# Patient Record
Sex: FEMALE | Race: OTHER | Employment: FULL TIME | URBAN - METROPOLITAN AREA
[De-identification: names, ages, dates, MRNs, and addresses within clinical notes are randomized per-mention and may not be internally consistent; named-entity substitution may affect disease eponyms.]

---

## 2019-11-24 ENCOUNTER — HOSPITAL ENCOUNTER (EMERGENCY)
Age: 39
Discharge: HOME | End: 2019-11-24
Attending: EMERGENCY MEDICINE
Payer: COMMERCIAL

## 2019-11-24 VITALS
DIASTOLIC BLOOD PRESSURE: 70 MMHG | TEMPERATURE: 98.4 F | WEIGHT: 204 LBS | HEIGHT: 63 IN | RESPIRATION RATE: 18 BRPM | OXYGEN SATURATION: 100 % | SYSTOLIC BLOOD PRESSURE: 121 MMHG | BODY MASS INDEX: 36.14 KG/M2 | HEART RATE: 72 BPM

## 2019-11-24 DIAGNOSIS — B96.89 BV (BACTERIAL VAGINOSIS): Primary | ICD-10-CM

## 2019-11-24 DIAGNOSIS — N76.0 BV (BACTERIAL VAGINOSIS): Primary | ICD-10-CM

## 2019-11-24 LAB
HCG UR QL: NEGATIVE
SERVICE CMNT-IMP: NORMAL
WET PREP GENITAL: NORMAL
WET PREP GENITAL: NORMAL

## 2019-11-24 PROCEDURE — 96372 THER/PROPH/DIAG INJ SC/IM: CPT | Performed by: EMERGENCY MEDICINE

## 2019-11-24 PROCEDURE — 74011250636 HC RX REV CODE- 250/636: Performed by: EMERGENCY MEDICINE

## 2019-11-24 PROCEDURE — 81003 URINALYSIS AUTO W/O SCOPE: CPT | Performed by: EMERGENCY MEDICINE

## 2019-11-24 PROCEDURE — 74011250637 HC RX REV CODE- 250/637: Performed by: EMERGENCY MEDICINE

## 2019-11-24 PROCEDURE — 87210 SMEAR WET MOUNT SALINE/INK: CPT

## 2019-11-24 PROCEDURE — 74011000250 HC RX REV CODE- 250: Performed by: EMERGENCY MEDICINE

## 2019-11-24 PROCEDURE — 99284 EMERGENCY DEPT VISIT MOD MDM: CPT | Performed by: EMERGENCY MEDICINE

## 2019-11-24 PROCEDURE — 81025 URINE PREGNANCY TEST: CPT

## 2019-11-24 PROCEDURE — 87491 CHLMYD TRACH DNA AMP PROBE: CPT

## 2019-11-24 RX ORDER — SERTRALINE HYDROCHLORIDE 100 MG/1
100 TABLET, FILM COATED ORAL 2 TIMES DAILY
COMMUNITY
End: 2020-06-11

## 2019-11-24 RX ORDER — CLONAZEPAM 1 MG/1
1 TABLET ORAL 2 TIMES DAILY
COMMUNITY
End: 2020-06-11

## 2019-11-24 RX ORDER — AZITHROMYCIN 250 MG/1
1000 TABLET, FILM COATED ORAL
Status: COMPLETED | OUTPATIENT
Start: 2019-11-24 | End: 2019-11-24

## 2019-11-24 RX ORDER — FLUCONAZOLE 100 MG/1
200 TABLET ORAL
Qty: 2 TAB | Refills: 0 | Status: SHIPPED | OUTPATIENT
Start: 2019-11-24 | End: 2019-11-24

## 2019-11-24 RX ORDER — METRONIDAZOLE 500 MG/1
500 TABLET ORAL 3 TIMES DAILY
Qty: 21 TAB | Refills: 0 | Status: SHIPPED | OUTPATIENT
Start: 2019-11-24 | End: 2019-12-01

## 2019-11-24 RX ADMIN — LIDOCAINE HYDROCHLORIDE 250 MG: 10 INJECTION, SOLUTION INFILTRATION; PERINEURAL at 17:41

## 2019-11-24 RX ADMIN — AZITHROMYCIN MONOHYDRATE 1000 MG: 250 TABLET ORAL at 17:41

## 2019-11-24 NOTE — ED PROVIDER NOTES
Adam Pratt is a 46 y.o. female who presents to the ED with a chief complaint of vaginal discharge with odor. Patient states she has had this over the last few days and symptoms have been persistent she also feels like there is been a catheter in her urethra and she has had some discomfort with urination. She is currently going through a divorce and is concerned about 's infidelity and would like to be checked for STDs. Once we discussed that she is okay receiving empiric treatment for gonorrhea and chlamydia. Past Medical History:   Diagnosis Date    Anxiety     Arthritis     Depression        Past Surgical History:   Procedure Laterality Date    HX  SECTION      HX GYN           History reviewed. No pertinent family history. Social History     Socioeconomic History    Marital status:      Spouse name: Not on file    Number of children: Not on file    Years of education: Not on file    Highest education level: Not on file   Occupational History    Not on file   Social Needs    Financial resource strain: Not on file    Food insecurity:     Worry: Not on file     Inability: Not on file    Transportation needs:     Medical: Not on file     Non-medical: Not on file   Tobacco Use    Smoking status: Never Smoker    Smokeless tobacco: Never Used   Substance and Sexual Activity    Alcohol use:  Yes    Drug use: Never    Sexual activity: Not on file   Lifestyle    Physical activity:     Days per week: Not on file     Minutes per session: Not on file    Stress: Not on file   Relationships    Social connections:     Talks on phone: Not on file     Gets together: Not on file     Attends Yazidism service: Not on file     Active member of club or organization: Not on file     Attends meetings of clubs or organizations: Not on file     Relationship status: Not on file    Intimate partner violence:     Fear of current or ex partner: Not on file     Emotionally abused: Not on file     Physically abused: Not on file     Forced sexual activity: Not on file   Other Topics Concern    Not on file   Social History Narrative    Not on file         ALLERGIES: Patient has no known allergies. Review of Systems   Constitutional: Negative for chills, fatigue, fever and unexpected weight change. Gastrointestinal: Negative for abdominal pain, diarrhea, nausea and vomiting. Genitourinary: Positive for vaginal discharge. Skin: Negative for color change. All other systems reviewed and are negative. Vitals:    11/24/19 1655   BP: 116/77   Pulse: 76   Resp: 17   Temp: 98.4 °F (36.9 °C)   SpO2: 97%   Weight: 92.5 kg (204 lb)   Height: 5' 3\" (1.6 m)            Physical Exam  Vitals signs and nursing note reviewed. Constitutional:       General: She is not in acute distress. Appearance: Normal appearance. She is well-developed. She is not ill-appearing, toxic-appearing or diaphoretic. HENT:      Head: Normocephalic and atraumatic. Nose: Nose normal. No congestion or rhinorrhea. Mouth/Throat:      Mouth: Mucous membranes are moist.      Pharynx: No oropharyngeal exudate or posterior oropharyngeal erythema. Eyes:      General: No scleral icterus. Extraocular Movements: Extraocular movements intact. Conjunctiva/sclera: Conjunctivae normal.      Pupils: Pupils are equal, round, and reactive to light. Neck:      Musculoskeletal: Normal range of motion. Pulmonary:      Effort: Pulmonary effort is normal. No respiratory distress. Breath sounds: No stridor. Abdominal:      General: Abdomen is flat. There is no distension. Tenderness: There is no tenderness. There is no guarding or rebound. Hernia: No hernia is present. Genitourinary:     Vagina: Vaginal discharge (No sign of herpes some white discharge in the vagina otherwise normal exam.) present. Neurological:      Mental Status: She is alert.    Psychiatric:         Behavior: Behavior normal.          MDM  Number of Diagnoses or Management Options  Diagnosis management comments: Patient tested positive for bacterial vaginosis she was also empirically treated for chlamydia and gonorrhea. We will place her on Flagyl as well. Phani Mancilla MD; 11/24/2019 @6:00 PM Voice dictation software was used during the making of this note. This software is not perfect and grammatical and other typographical errors may be present.   This note has not been proofread for errors.  ===================================================================          Amount and/or Complexity of Data Reviewed  Clinical lab tests: ordered and reviewed (Results for orders placed or performed during the hospital encounter of 11/24/19  -WET PREP       Result                      Value             Ref Range           Special Requests:                                             NO SPECIAL REQUESTS       Wet prep                                                      NO WBC'S SEEN   NO YEAST SEEN   NO TRICHOMONAS SEEN          Wet prep                                                      MODERATE   CLUE CELLS PRESENT     -HCG URINE, QL. - POC       Result                      Value             Ref Range           Pregnancy test,urine (*     NEGATIVE          NEG           )           Procedures

## 2019-11-24 NOTE — ED NOTES
I have reviewed discharge instructions with the patient. The patient verbalized understanding. Patient left ED via Discharge Method: ambulatory to Home with daughter. Opportunity for questions and clarification provided. Patient given 2 scripts. To continue your aftercare when you leave the hospital, you may receive an automated call from our care team to check in on how you are doing. This is a free service and part of our promise to provide the best care and service to meet your aftercare needs.  If you have questions, or wish to unsubscribe from this service please call [phone]. Thank you for Choosing our [facility] Emergency Department.

## 2019-11-24 NOTE — ED TRIAGE NOTES
Pt c/o white to pink discharge with odor and right abdominal pain for a couple of days. Pt has a history of STD's.

## 2019-11-24 NOTE — DISCHARGE INSTRUCTIONS
Patient Education     Bacterial Vaginosis: Care Instructions  Your Care Instructions    Bacterial vaginosis is a type of vaginal infection. It is caused by excess growth of certain bacteria that are normally found in the vagina. Symptoms can include itching, swelling, pain when you urinate or have sex, and a gray or yellow discharge with a \"fishy\" odor. It is not considered an infection that is spread through sexual contact. Although symptoms can be annoying and uncomfortable, bacterial vaginosis does not usually cause other health problems. However, if you have it while you are pregnant, it can cause complications. While the infection may go away on its own, most doctors use antibiotics to treat it. You may have been prescribed pills or vaginal cream. With treatment, bacterial vaginosis usually clears up in 5 to 7 days. Follow-up care is a key part of your treatment and safety. Be sure to make and go to all appointments, and call your doctor if you are having problems. It's also a good idea to know your test results and keep a list of the medicines you take. How can you care for yourself at home? · Take your antibiotics as directed. Do not stop taking them just because you feel better. You need to take the full course of antibiotics. · Do not eat or drink anything that contains alcohol if you are taking metronidazole (Flagyl). · Keep using your medicine if you start your period. Use pads instead of tampons while using a vaginal cream or suppository. Tampons can absorb the medicine. · Wear loose cotton clothing. Do not wear nylon and other materials that hold body heat and moisture close to the skin. · Do not scratch. Relieve itching with a cold pack or a cool bath. · Do not wash your vaginal area more than once a day. Use plain water or a mild, unscented soap. Do not douche. When should you call for help?   Watch closely for changes in your health, and be sure to contact your doctor if:    · You have unexpected vaginal bleeding.     · You have a fever.     · You have new or increased pain in your vagina or pelvis.     · You are not getting better after 1 week.     · Your symptoms return after you finish the course of your medicine. Where can you learn more? Go to http://yanelis-elliot.info/. Whit Arevalo in the search box to learn more about \"Bacterial Vaginosis: Care Instructions. \"  Current as of: February 19, 2019  Content Version: 12.2  © 8724-1232 [email or web address], [employer]. Care instructions adapted under license by [employer] (which disclaims liability or warranty for this information). If you have questions about a medical condition or this instruction, always ask your healthcare professional. [address] any warranty or liability for your use of this information.

## 2019-11-27 LAB
C TRACH RRNA SPEC QL NAA+PROBE: NEGATIVE
N GONORRHOEA RRNA SPEC QL NAA+PROBE: NEGATIVE
SPECIMEN SOURCE: NORMAL

## 2020-06-11 ENCOUNTER — HOSPITAL ENCOUNTER (EMERGENCY)
Age: 40
Discharge: HOME | End: 2020-06-11
Attending: EMERGENCY MEDICINE
Payer: COMMERCIAL

## 2020-06-11 VITALS
HEART RATE: 85 BPM | WEIGHT: 230 LBS | TEMPERATURE: 98.5 F | DIASTOLIC BLOOD PRESSURE: 84 MMHG | BODY MASS INDEX: 40.75 KG/M2 | HEIGHT: 63 IN | RESPIRATION RATE: 16 BRPM | SYSTOLIC BLOOD PRESSURE: 174 MMHG | OXYGEN SATURATION: 98 %

## 2020-06-11 DIAGNOSIS — F43.22 ADJUSTMENT DISORDER WITH ANXIOUS MOOD: Primary | ICD-10-CM

## 2020-06-11 PROCEDURE — 99283 EMERGENCY DEPT VISIT LOW MDM: CPT

## 2020-06-11 RX ORDER — LORAZEPAM 1 MG/1
.5-1 TABLET ORAL
Qty: 20 TAB | Refills: 0 | Status: SHIPPED | OUTPATIENT
Start: 2020-06-11 | End: 2022-01-10 | Stop reason: CLARIF

## 2020-06-11 RX ORDER — SERTRALINE HYDROCHLORIDE 100 MG/1
TABLET, FILM COATED ORAL
Qty: 60 TAB | Refills: 3 | Status: SHIPPED | OUTPATIENT
Start: 2020-06-11

## 2020-06-11 NOTE — ED NOTES
I have reviewed discharge instructions with the patient. The patient verbalized understanding. Patient left ED via Discharge Method: ambulatory to Home. Opportunity for questions and clarification provided. Patient given 2 scripts. To continue your aftercare when you leave the hospital, you may receive an automated call from our care team to check in on how you are doing. This is a free service and part of our promise to provide the best care and service to meet your aftercare needs.  If you have questions, or wish to unsubscribe from this service please call [phone]. Thank you for Choosing our [facility] Emergency Department.

## 2020-06-11 NOTE — ED TRIAGE NOTES
Patient signed in with back pain however when she walked into triage patient advises she is not having any back pain, in tears she states that she is probably going to get a divorce that her  kicked her out yesterday and her company may be bought out and possibly lose her job over the next few months. Patient advises some suicidal thoughts today, does not have a plan. Patient denies any thoughts of homicide. Patient advises she has had suicidal thoughts before and had inpatient care while living in [place] over 2 years ago. Patient advises that she was a patient at [facility] but due to work schedule now she has not been able to be seen there, advises that she was on Zoloft 200 mg daily and Ativan at that time. Patient advises that she has not been on any other medications until Flexeril and Aleve for recent back pain.

## 2020-06-12 NOTE — PROGRESS NOTES
2nd attempt at reaching pt, she answered and we discussed Psychiatry Care. Pt states taht she was referred to [address] [place] [address] but is not able to make it there during there business hours. She works for [employer] and if she were to go during work hours it would count as a \"point against her\". I reviewed pt's chart, and faxed clinicals to 3 local Psychiatry groups. Information sent to [facility], [facility] [employer] and [facility]. Provided pt with there information and advised her to call this afternoon to help expedite a appt. Encouraged other to please call back if she ran into any further issues.

## 2020-06-12 NOTE — ED PROVIDER NOTES
60-year-old female presents to the ER feeling depressed and anxious with fleeting suicidal ideation. Stress. Believes her  is having another affair behind her back, and has just today told her to leave the house and move out. And that he wants a divorce. 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 Prior depression prior inpatient treatment, patient did okay on Zoloft in the past but has not been on it for some time. Patient does not feel she is at risk for acting on any of the suicidal thoughts. She has secured an apartment for herself today. Past Medical History:   Diagnosis Date    Anxiety     Arthritis     Depression        Past Surgical History:   Procedure Laterality Date    HX  SECTION      HX GYN           History reviewed. No pertinent family history.     Social History     Socioeconomic History    Marital status:      Spouse name: Not on file    Number of children: Not on file    Years of education: Not on file    Highest education level: Not on file   Occupational History    Not on file   Social Needs    Financial resource strain: Not on file    Food insecurity     Worry: Not on file     Inability: Not on file    Transportation needs     Medical: Not on file     Non-medical: Not on file   Tobacco Use    Smoking status: Never Smoker    Smokeless tobacco: Never Used   Substance and Sexual Activity    Alcohol use: Not Currently    Drug use: Never    Sexual activity: Not on file   Lifestyle    Physical activity     Days per week: Not on file     Minutes per session: Not on file    Stress: Not on file   Relationships    Social connections     Talks on phone: Not on file     Gets together: Not on file     Attends Hindu service: Not on file     Active member of club or organization: Not on file     Attends meetings of clubs or organizations: Not on file     Relationship status: Not on file    Intimate partner violence     Fear of current or ex partner: Not on file     Emotionally abused: Not on file     Physically abused: Not on file     Forced sexual activity: Not on file   Other Topics Concern    Not on file   Social History Narrative    Not on file         ALLERGIES: Patient has no known allergies. Review of Systems   Psychiatric/Behavioral: Positive for dysphoric mood and suicidal ideas. Negative for agitation, confusion and self-injury. The patient is nervous/anxious. Vitals:    06/11/20 1735   BP: 174/84   Pulse: 85   Resp: 16   Temp: 98.5 °F (36.9 °C)   SpO2: 98%   Weight: 104.3 kg (230 lb)   Height: 5' 3\" (1.6 m)            Physical Exam  Vitals signs and nursing note reviewed. Constitutional:       General: She is not in acute distress. Appearance: Normal appearance. She is well-developed. She is not ill-appearing, toxic-appearing or diaphoretic. HENT:      Head: Normocephalic and atraumatic. Eyes:      General:         Right eye: No discharge. Left eye: No discharge. Conjunctiva/sclera: Conjunctivae normal.   Neck:      Musculoskeletal: Normal range of motion and neck supple. Pulmonary:      Effort: Pulmonary effort is normal. No respiratory distress. Musculoskeletal: Normal range of motion. Skin:     General: Skin is warm and dry. Findings: No rash. Neurological:      General: No focal deficit present. Mental Status: She is alert and oriented to person, place, and time. Mental status is at baseline. Motor: No abnormal muscle tone. Comments: cni 2-12 grossly  Nl gait,  Nl speech     Psychiatric:         Attention and Perception: Attention normal.         Mood and Affect: Mood is depressed (Tearful). Speech: Speech normal. She is communicative. Speech is not delayed. Behavior: Behavior normal. Behavior is cooperative.          Thought Content: Thought content includes suicidal ideation. Thought content does not include suicidal plan. MDM  Number of Diagnoses or Management Options  Adjustment disorder with anxious mood:   Diagnosis management comments: Medical decision making note:  Adjustment reaction to multiple social stressors. Depression with anxiety. We will start her on a Zoloft taper, and will go ahead and write for a few Ativan just since the marital problem has suddenly come to ahead today. Will have nurse  patient tomorrow to provide additional resources. Patient had been to [facility] in the past but had difficulty following up with them recently because she works first shift during the hours they are open. This concludes the \"medical decision making note\" part of this emergency department visit note.            Procedures

## 2020-08-04 PROBLEM — E66.01 OBESITY, MORBID (HCC): Status: ACTIVE | Noted: 2020-08-04

## 2021-08-31 ENCOUNTER — HOSPITAL ENCOUNTER (EMERGENCY)
Age: 41
Discharge: HOME | End: 2021-08-31
Attending: EMERGENCY MEDICINE
Payer: COMMERCIAL

## 2021-08-31 ENCOUNTER — APPOINTMENT (OUTPATIENT)
Dept: GENERAL RADIOLOGY | Age: 41
End: 2021-08-31
Attending: PHYSICIAN ASSISTANT
Payer: COMMERCIAL

## 2021-08-31 VITALS
SYSTOLIC BLOOD PRESSURE: 123 MMHG | HEIGHT: 63 IN | HEART RATE: 81 BPM | DIASTOLIC BLOOD PRESSURE: 70 MMHG | RESPIRATION RATE: 18 BRPM | OXYGEN SATURATION: 98 % | WEIGHT: 248 LBS | TEMPERATURE: 99.7 F | BODY MASS INDEX: 43.94 KG/M2

## 2021-08-31 DIAGNOSIS — S62.002A UNSPECIFIED FRACTURE OF NAVICULAR (SCAPHOID) BONE OF LEFT WRIST, INITIAL ENCOUNTER FOR CLOSED FRACTURE: ICD-10-CM

## 2021-08-31 DIAGNOSIS — S93.401A SPRAIN OF RIGHT ANKLE, UNSPECIFIED LIGAMENT, INITIAL ENCOUNTER: ICD-10-CM
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
## 2021-08-31 PROCEDURE — 73610 X-RAY EXAM OF ANKLE: CPT

## 2021-08-31 PROCEDURE — 75810000053 HC SPLINT APPLICATION

## 2021-08-31 PROCEDURE — 73110 X-RAY EXAM OF WRIST: CPT

## 2021-08-31 PROCEDURE — 99282 EMERGENCY DEPT VISIT SF MDM: CPT

## 2021-08-31 RX ORDER — DICLOFENAC POTASSIUM 50 MG/1
50 TABLET, FILM COATED ORAL
Qty: 30 TABLET | Refills: 0 | Status: SHIPPED | OUTPATIENT
Start: 2021-08-31 | End: 2022-01-10

## 2021-08-31 RX ORDER — TRAMADOL HYDROCHLORIDE 50 MG/1
50 TABLET ORAL
Qty: 10 TABLET | Refills: 0 | Status: SHIPPED | OUTPATIENT
Start: 2021-08-31 | End: 2021-09-02 | Stop reason: SDUPTHER

## 2021-08-31 RX ORDER — NALOXONE HYDROCHLORIDE 4 MG/.1ML
SPRAY NASAL
Qty: 1 EACH | Refills: 0 | Status: SHIPPED | OUTPATIENT
Start: 2021-08-31 | End: 2022-01-10

## 2021-08-31 NOTE — Clinical Note
[facility] EMERGENCY DEPT  [address] [address] [phone]    Work/School Note    Date: 8/31/2021    To Whom It May concern:    Uriel De Leon was seen and treated today in the emergency room by the following provider(s):  Attending Provider: Trevon Keith MD  Physician Assistant: SHAINA Echevarria. Uriel De Leon is excused from work/school on 8/31/2021 through 9/3/2021. She is medically clear to return to work/school on 9/4/2021.         Sincerely,          SHAINA Stokes

## 2021-08-31 NOTE — ED TRIAGE NOTES
Pt here with right ankle pain/swelling. Did hear/felt something pop. This happened while walking her dog and she stepped in a hole. Also c/o left wrist pain. Masked.

## 2021-08-31 NOTE — DISCHARGE INSTRUCTIONS
Rest, ice, elevate, avoid painful activities. ED if worse. Follow up with Ortho for recheck. Use crutches to help ambulate, use velcro ankle splint, do not remove wrist splint.

## 2021-08-31 NOTE — ED NOTES
I have reviewed discharge instructions with the patient. The patient verbalized understanding. Patient left ED via Discharge Method: ambulatory to Home with self. Opportunity for questions and clarification provided. Patient given 3 scripts. To continue your aftercare when you leave the hospital, you may receive an automated call from our care team to check in on how you are doing. This is a free service and part of our promise to provide the best care and service to meet your aftercare needs.  If you have questions, or wish to unsubscribe from this service please call [phone]. Thank you for Choosing our [facility] Emergency Department.

## 2021-08-31 NOTE — ED PROVIDER NOTES
Patient was walking her dog prior to arrival when it took off and pulled her. She turned her right ankle and landed on an outstretched arm on the left. She has left wrist pain and right ankle pain. She is able to ambulate with an antalgic gait due to pain in her ankle. She did not hit her head nor did she have any loss of consciousness. No chest pain, shortness of breath, abdominal pain or other new symptoms. The history is provided by the patient. Wrist Pain   This is a new problem. The current episode started 1 to 2 hours ago. The problem occurs constantly. The problem has not changed since onset. The pain is present in the left wrist and right ankle. The quality of the pain is described as aching. The pain is at a severity of 7/10. The pain is moderate. The symptoms are aggravated by movement and activity. She has tried nothing for the symptoms. There has been a history of trauma. Past Medical History:   Diagnosis Date    Abnormal Papanicolaou smear of cervix 2004    repeat normal    Anemia     Anxiety     Arthritis     Depression     Herpes genitalia     History of chicken pox        Past Surgical History:   Procedure Laterality Date    HX  SECTION      HX DILATION AND CURETTAGE  2011    Miscarriage    HX GYN  2018    tubes removed         Family History:   Problem Relation Age of Onset    Depression Mother     Liver Disease Mother         hep c cirrhos    Hypertension Father     Arthritis-osteo Maternal Aunt     Hypertension Paternal Aunt     Diabetes Maternal Grandmother     Breast Cancer Maternal Grandmother         lung. Pt actually not sure which was the primary.  She  at 58    Osteoporosis Maternal Grandmother     Hypertension Paternal Grandmother        Social History     Socioeconomic History    Marital status: LEGALLY      Spouse name: Not on file    Number of children: Not on file    Years of education: Not on file    Highest education level: Not on file   Occupational History    Not on file   Tobacco Use    Smoking status: Never Smoker    Smokeless tobacco: Never Used   Substance and Sexual Activity    Alcohol use: Not Currently    Drug use: Never    Sexual activity: Not Currently     Birth control/protection: Surgical   Other Topics Concern    Not on file   Social History Narrative    Not on file     Social Determinants of Health     Financial Resource Strain:     Difficulty of Paying Living Expenses:    Food Insecurity:     Worried About Running Out of Food in the Last Year:     [address] in the Last Year:    Transportation Needs:     Lack of Transportation (Medical):  Lack of Transportation (Non-Medical):    Physical Activity:     Days of Exercise per Week:     Minutes of Exercise per Session:    Stress:     Feeling of Stress :    Social Connections:     Frequency of Communication with Friends and Family:     Frequency of Social Gatherings with Friends and Family:     Attends Shinto Services:     Active Member of Clubs or Organizations:     Attends Club or Organization Meetings:     Marital Status:    Intimate Partner Violence:     Fear of Current or Ex-Partner:     Emotionally Abused:     Physically Abused:     Sexually Abused: ALLERGIES: Patient has no known allergies. Review of Systems   Constitutional: Negative. HENT: Negative. Eyes: Negative. Respiratory: Negative. Cardiovascular: Negative. Gastrointestinal: Negative. Genitourinary: Negative. Musculoskeletal: Negative. Right ankle and left wrist pain   Skin: Negative. Neurological: Negative. Psychiatric/Behavioral: Negative. All other systems reviewed and are negative. Vitals:    08/31/21 1453   BP: 123/70   Pulse: 81   Resp: 18   Temp: 99.7 °F (37.6 °C)   SpO2: 98%   Weight: 112.5 kg (248 lb)   Height: 5' 3\" (1.6 m)            Physical Exam  Vitals and nursing note reviewed.    Constitutional: Appearance: She is well-developed. HENT:      Head: Normocephalic and atraumatic. Right Ear: External ear normal.      Left Ear: External ear normal.      Nose: Nose normal.   Eyes:      Conjunctiva/sclera: Conjunctivae normal.      Pupils: Pupils are equal, round, and reactive to light. Cardiovascular:      Rate and Rhythm: Normal rate and regular rhythm. Heart sounds: Normal heart sounds. Pulmonary:      Effort: Pulmonary effort is normal.      Breath sounds: Normal breath sounds. Abdominal:      General: Bowel sounds are normal.      Palpations: Abdomen is soft. Musculoskeletal:         General: Swelling and tenderness present. Normal range of motion. Left wrist: Tenderness and bony tenderness present. Arms:       Cervical back: Normal range of motion and neck supple. Legs:    Skin:     General: Skin is warm and dry. Capillary Refill: Capillary refill takes less than 2 seconds. Neurological:      General: No focal deficit present. Mental Status: She is alert and oriented to person, place, and time. Deep Tendon Reflexes: Reflexes are normal and symmetric. Psychiatric:         Mood and Affect: Mood normal.         Behavior: Behavior normal.         Thought Content:  Thought content normal.         Judgment: Judgment normal.          MDM  Number of Diagnoses or Management Options     Amount and/or Complexity of Data Reviewed  Tests in the radiology section of CPT®: ordered and reviewed    Risk of Complications, Morbidity, and/or Mortality  Presenting problems: moderate  Diagnostic procedures: moderate  Management options: moderate    Patient Progress  Patient progress: improved         Splint, Thumb Gutter    Date/Time: 8/31/2021 5:29 PM  Performed by: Sophie Skiff, PA  Authorized by: Sophie Skiff, PA     Consent:     Consent obtained:  Verbal    Consent given by:  Patient    Risks discussed:  Discoloration, numbness, pain and swelling    Alternatives discussed:  No treatment, delayed treatment, alternative treatment, observation and referral  Pre-procedure details:     Sensation:  Normal  Procedure details:     Laterality:  Left    Location:  Wrist    Wrist:  L wrist    Splint type:  Thumb spica    Supplies:  Ortho-Glass  Post-procedure details:     Pain:  Improved    Patient tolerance of procedure: Tolerated well, no immediate complications        The patient was observed in the ED. Results Reviewed:  XR WRIST LT AP/LAT/OBL MIN 3V   Final Result      1. There is a subtle lucency involving the distal pole of the scaphoid,   incompletely traversing the bone. This is seen on the oblique view only and may   be a vascular channel. However, if there is tenderness to palpation in the   region of the scaphoid, a nondisplaced fracture is possible. MRI may be   beneficial for further evaluation as deemed clinically useful. 2. Right ankle soft tissue swelling without underlying fracture. Degenerative   findings are otherwise as detailed above. VOICE DICTATED BY: Dr. Adalberto Mayen          XR ANKLE RT MIN 3 V   Final Result      1. There is a subtle lucency involving the distal pole of the scaphoid,   incompletely traversing the bone. This is seen on the oblique view only and may   be a vascular channel. However, if there is tenderness to palpation in the   region of the scaphoid, a nondisplaced fracture is possible. MRI may be   beneficial for further evaluation as deemed clinically useful. 2. Right ankle soft tissue swelling without underlying fracture. Degenerative   findings are otherwise as detailed above. VOICE DICTATED BY: Dr. Adalberto Mayen            Patient will be placed in a thumb spica splint here for the possible scaphoid findings on x-ray. She does have mild limited range of motion but no point tenderness right in that area. We will treat conservatively anyway.   I did refer her to orthopedics for further evaluation. We will also place her in a Velcro ankle splint and give her crutches to help her keep her weight off of that ankle. I have written a note out of work until she can see the orthopedic for follow-up. She should rest, ice, elevate and avoid painful activities. She is stable for discharge at this time. I have sent her with pain medicine if needed as well. I discussed the results of all labs, procedures, radiographs, and treatments with the patient and available family. Treatment plan is agreed upon and the patient is ready for discharge. All voiced understanding of the discharge plan and medication instructions or changes as appropriate. Questions about treatment in the ED were answered. All were encouraged to return should symptoms worsen or new problems develop.

## 2021-08-31 NOTE — LETTER
[address] [address] EMERGENCY DEPT  [facility] [id number] [phone]    Work/School Note    Date: 8/31/2021    To Whom It May concern:    Uriel De Leon was seen and treated today in the emergency room by the following provider(s):  Physician Assistant: SHAINA Echevarria. Uriel De Leon may return to work after appointment with Ortho.     Sincerely,          SHAINA Stokes

## 2021-09-02 PROBLEM — S63.502A SPRAIN OF LEFT WRIST: Status: ACTIVE | Noted: 2021-09-02

## 2021-09-02 PROBLEM — M25.532 LEFT WRIST PAIN: Status: ACTIVE | Noted: 2021-09-02

## 2022-01-04 NOTE — H&P (VIEW-ONLY)
Name: Shahid Field  YOB: 1980  Gender: female  MRN: [id number]    Summary: Right peroneal tearing, ATFL CFL tear, ankle synovitis and deltoid tear. Proceed with ankle arthroscopy, peroneal tendon repair/debridement. Popliteal saphenous block. CC: Right lateral ankle/foot pain    HPI: Shahid Field is a 37 y.o. female who presents today with lateral ankle pain which began August 31, 2021 when she rolled her foot in a ditch. She states she had difficult time walking. She went to an urgent care who diagnosed her with an ankle sprain. She saw Dr. Chrissie Olmedo who treated her with physical therapy, cam walker boot, transition to an ankle brace, nonsteroidal medications, and a Medrol Dosepak. She has improved but she continues to have pain. She cannot work. She cannot do most activities due to ankle pain. .   They locate the pain over the lateral ankle and behind the fibula. They describe pain as a sharp, deep and tearing lateral ankle pain. She describes pain also throughout the front of the ankle and extending medially. She states when she walks down stairs her ankle feels unstable and she can barely go down. Going up stairs is not as problematic. She states her ankle feels uneasy and she feels like it is about to give way at all times. She has attempted physical therapy, Medrol Dosepak, cam boot, ASO, nonsteroidal medications. History was obtained by patient    ROS/Meds/PSH/PMH/FH/SH: I personally reviewed the patients standard intake form. Below are the pertinents    Tobacco:  reports that she has never smoked. She has never used smokeless tobacco.  Diabetes: None  Other: none    Physical Examination:  Right Lower Extremity: : FROM actively of toes, foot, knee and hip. 55 strength to TAEHLGSCPtib/Ant Tib. When isolating their peroneals there is  pain w resisted eversion and resisted plantarflexion. No skin lesions or ulcers.   They are TTP over the peroneal tendons behind the fibula and even sub fibular. Very tender over the deltoid ligament. Very tender over the anterior lateral ankle ligamentous complex. She has some swelling and edema noted throughout the ankle. Talar tilt exam : Was too painful and she would not allow. Anterior-lateral rotational drawer exam : To painful and would not allow. Imaging:   I reviewed the MRI taken on 10/15/2021. IMPRESSION  Impression:  1. High-grade versus complete tears of the anterior talofibular and  calcaneofibular ligaments. 2. Sprain versus partial tears to the deltoid ligament complex. 3. Partial longitudinal split tearing of the inframalleolar peroneus brevis  tendon. 4. Small tibiotalar joint effusion    Assessment:   Right Peroneal Tendonitis  Right ankle ATFL and CFL tear with instability  Right ankle synovitis    Plan:   4 This is an acute complicated injury  Treatment at this time: Elective major surgery with procedural risk factors    Weight-bearing status: WBAT in Ankle Brace        Studies ordered: none  Return to work/work restrictions: No work until next follow up  Prednisone Taper- Dose Pack:  I discussed Prednisone being a steroid and how it can elevate blood sugars. I recommended to monitor sugars closely and to beware of symptoms such as lethargy, excessive thirst, polyuria, and GI distress. We also discussed the dose pack taper format and how long term steroid use can alter adrenal function. I also discussed steroids effect on depression and mood. How in some people it can exacerbate underlying depression while in others create a more manic response. Follow up       I want her to continue physical therapy as well. I had a long discussion with her about nonoperative treatment to consist of continued physical therapy, bracing, and injections. She states she is not interested in injections and does not wish to delay over her recovery.   I explained that nonoperative treatment could be successful however cannot guarantee it. She does not want to continue nonoperative management if I cannot guarantee Success. I then discussed surgical treatment with her to consist of ankle arthroscopy and peroneal tendon debridement versus repair. I explained that each of these 3 different surgeries have individual risks include nerve damage, tendon damage, chronic pain, and infections. I explained that although I do know she has pathology in each of these segments I cannot guarantee her surgery would be 100% successful. In fact I told her she would continue to have some swelling and aching despite surgery. She is almost 3 months out from her injury and is attempted all nonoperative treatments. MRI does show pathology through her peroneal tendons. I think trend the peroneal tendons and ankle arthroscopy will provide relief of her symptoms. Based on our discussions today and the fact that she is approximately 4 months out from her injury I think that a lateral ankle reconstruction is not appropriate. I discussed that she may continue to have symptoms of the lateral ankle reconstruction ligaments/ATFL/CFL and continue to have deltoid ligament issues. However we know that these should heal themselves and that surgery on these ligaments is an appropriate at this time. I explained that if she continues to have symptoms of the lateral ankle ligamentous complex or medial deltoid in the future we can address them but these normally heal on their own and do not require surgical intervention. .  She understands these risk and consents to surgery.

## 2022-01-11 ENCOUNTER — ANESTHESIA EVENT (OUTPATIENT)
Dept: SURGERY | Age: 42
End: 2022-01-11
Payer: COMMERCIAL

## 2022-01-12 ENCOUNTER — HOSPITAL ENCOUNTER (OUTPATIENT)
Age: 42
Setting detail: OUTPATIENT SURGERY
Discharge: HOME | End: 2022-01-12
Attending: ORTHOPAEDIC SURGERY | Admitting: ORTHOPAEDIC SURGERY
Payer: COMMERCIAL

## 2022-01-12 ENCOUNTER — ANESTHESIA (OUTPATIENT)
Dept: SURGERY | Age: 42
End: 2022-01-12
Payer: COMMERCIAL

## 2022-01-12 VITALS
OXYGEN SATURATION: 100 % | HEART RATE: 104 BPM | SYSTOLIC BLOOD PRESSURE: 128 MMHG | WEIGHT: 235 LBS | BODY MASS INDEX: 41.63 KG/M2 | RESPIRATION RATE: 16 BRPM | TEMPERATURE: 97.8 F | DIASTOLIC BLOOD PRESSURE: 63 MMHG

## 2022-01-12 LAB
COVID-19 RAPID TEST, COVR: NOT DETECTED
SOURCE, COVRS: NORMAL

## 2022-01-12 PROCEDURE — 77030002933 HC SUT MCRYL J&J -A: Performed by: ORTHOPAEDIC SURGERY

## 2022-01-12 PROCEDURE — 76060000034 HC ANESTHESIA 1.5 TO 2 HR: Performed by: ORTHOPAEDIC SURGERY

## 2022-01-12 PROCEDURE — 74011250636 HC RX REV CODE- 250/636: Performed by: NURSE ANESTHETIST, CERTIFIED REGISTERED

## 2022-01-12 PROCEDURE — 76210000006 HC OR PH I REC 0.5 TO 1 HR: Performed by: ORTHOPAEDIC SURGERY

## 2022-01-12 PROCEDURE — 76010000162 HC OR TIME 1.5 TO 2 HR INTENSV-TIER 1: Performed by: ORTHOPAEDIC SURGERY

## 2022-01-12 PROCEDURE — 76010010054 HC POST OP PAIN BLOCK: Performed by: ORTHOPAEDIC SURGERY

## 2022-01-12 PROCEDURE — 77030003602 HC NDL NRV BLK BBMI -B: Performed by: ANESTHESIOLOGY

## 2022-01-12 PROCEDURE — 77030002922 HC SUT FBRWRE ARTH -B: Performed by: ORTHOPAEDIC SURGERY

## 2022-01-12 PROCEDURE — 74011250636 HC RX REV CODE- 250/636: Performed by: ANESTHESIOLOGY

## 2022-01-12 PROCEDURE — 77030003666 HC NDL SPINAL BD -A: Performed by: ORTHOPAEDIC SURGERY

## 2022-01-12 PROCEDURE — 27658 REPAIR OF LEG TENDON EACH: CPT | Performed by: ORTHOPAEDIC SURGERY

## 2022-01-12 PROCEDURE — 27680 RELEASE OF LOWER LEG TENDON: CPT | Performed by: ORTHOPAEDIC SURGERY

## 2022-01-12 PROCEDURE — 29897 ANKLE ARTHROSCOPY/SURGERY: CPT | Performed by: ORTHOPAEDIC SURGERY

## 2022-01-12 PROCEDURE — 74011000250 HC RX REV CODE- 250: Performed by: NURSE ANESTHETIST, CERTIFIED REGISTERED

## 2022-01-12 PROCEDURE — 77030040922 HC BLNKT HYPOTHRM STRY -A: Performed by: ANESTHESIOLOGY

## 2022-01-12 PROCEDURE — 74011250636 HC RX REV CODE- 250/636: Performed by: ORTHOPAEDIC SURGERY

## 2022-01-12 PROCEDURE — 77030002916 HC SUT ETHLN J&J -A: Performed by: ORTHOPAEDIC SURGERY

## 2022-01-12 PROCEDURE — 77030002982 HC SUT POLYSRB J&J -A: Performed by: ORTHOPAEDIC SURGERY

## 2022-01-12 PROCEDURE — 77030038066 HC BLD SHVR ARTH -B: Performed by: ORTHOPAEDIC SURGERY

## 2022-01-12 PROCEDURE — 76942 ECHO GUIDE FOR BIOPSY: CPT | Performed by: ORTHOPAEDIC SURGERY

## 2022-01-12 PROCEDURE — 87635 SARS-COV-2 COVID-19 AMP PRB: CPT

## 2022-01-12 PROCEDURE — 74011250637 HC RX REV CODE- 250/637: Performed by: ANESTHESIOLOGY

## 2022-01-12 PROCEDURE — 77030000032 HC CUF TRNQT ZIMM -B: Performed by: ORTHOPAEDIC SURGERY

## 2022-01-12 PROCEDURE — 77030010210 HC SPLNT P-CUT SAF BSNM -B: Performed by: ORTHOPAEDIC SURGERY

## 2022-01-12 PROCEDURE — 2709999900 HC NON-CHARGEABLE SUPPLY: Performed by: ORTHOPAEDIC SURGERY

## 2022-01-12 PROCEDURE — 76210000020 HC REC RM PH II FIRST 0.5 HR: Performed by: ORTHOPAEDIC SURGERY

## 2022-01-12 RX ORDER — HYDROMORPHONE HYDROCHLORIDE 2 MG/ML
0.5 INJECTION, SOLUTION INTRAMUSCULAR; INTRAVENOUS; SUBCUTANEOUS
Status: DISCONTINUED | OUTPATIENT
Start: 2022-01-12 | End: 2022-01-12 | Stop reason: HOSPADM

## 2022-01-12 RX ORDER — ACETAMINOPHEN 500 MG
1000 TABLET ORAL ONCE
Status: COMPLETED | OUTPATIENT
Start: 2022-01-12 | End: 2022-01-12

## 2022-01-12 RX ORDER — SODIUM CHLORIDE, SODIUM LACTATE, POTASSIUM CHLORIDE, CALCIUM CHLORIDE 600; 310; 30; 20 MG/100ML; MG/100ML; MG/100ML; MG/100ML
75 INJECTION, SOLUTION INTRAVENOUS CONTINUOUS
Status: DISCONTINUED | OUTPATIENT
Start: 2022-01-12 | End: 2022-01-12 | Stop reason: HOSPADM

## 2022-01-12 RX ORDER — CEFAZOLIN SODIUM/WATER 2 G/20 ML
2 SYRINGE (ML) INTRAVENOUS ONCE
Status: COMPLETED | OUTPATIENT
Start: 2022-01-12 | End: 2022-01-12

## 2022-01-12 RX ORDER — FENTANYL CITRATE 50 UG/ML
100 INJECTION, SOLUTION INTRAMUSCULAR; INTRAVENOUS ONCE
Status: COMPLETED | OUTPATIENT
Start: 2022-01-12 | End: 2022-01-12

## 2022-01-12 RX ORDER — PROPOFOL 10 MG/ML
INJECTION, EMULSION INTRAVENOUS AS NEEDED
Status: DISCONTINUED | OUTPATIENT
Start: 2022-01-12 | End: 2022-01-12 | Stop reason: HOSPADM

## 2022-01-12 RX ORDER — DEXAMETHASONE SODIUM PHOSPHATE 4 MG/ML
INJECTION, SOLUTION INTRA-ARTICULAR; INTRALESIONAL; INTRAMUSCULAR; INTRAVENOUS; SOFT TISSUE
Status: COMPLETED | OUTPATIENT
Start: 2022-01-12 | End: 2022-01-12

## 2022-01-12 RX ORDER — LIDOCAINE HYDROCHLORIDE 10 MG/ML
0.1 INJECTION INFILTRATION; PERINEURAL AS NEEDED
Status: DISCONTINUED | OUTPATIENT
Start: 2022-01-12 | End: 2022-01-12 | Stop reason: HOSPADM

## 2022-01-12 RX ORDER — PROPOFOL 10 MG/ML
INJECTION, EMULSION INTRAVENOUS
Status: DISCONTINUED | OUTPATIENT
Start: 2022-01-12 | End: 2022-01-12 | Stop reason: HOSPADM

## 2022-01-12 RX ORDER — MIDAZOLAM HYDROCHLORIDE 1 MG/ML
2 INJECTION, SOLUTION INTRAMUSCULAR; INTRAVENOUS
Status: COMPLETED | OUTPATIENT
Start: 2022-01-12 | End: 2022-01-12

## 2022-01-12 RX ORDER — SODIUM CHLORIDE 0.9 % (FLUSH) 0.9 %
5-40 SYRINGE (ML) INJECTION AS NEEDED
Status: DISCONTINUED | OUTPATIENT
Start: 2022-01-12 | End: 2022-01-12 | Stop reason: HOSPADM

## 2022-01-12 RX ORDER — OXYCODONE HYDROCHLORIDE 5 MG/1
5 TABLET ORAL
Status: DISCONTINUED | OUTPATIENT
Start: 2022-01-12 | End: 2022-01-12 | Stop reason: HOSPADM

## 2022-01-12 RX ORDER — ALBUTEROL SULFATE 0.83 MG/ML
2.5 SOLUTION RESPIRATORY (INHALATION) AS NEEDED
Status: DISCONTINUED | OUTPATIENT
Start: 2022-01-12 | End: 2022-01-12 | Stop reason: HOSPADM

## 2022-01-12 RX ORDER — EPHEDRINE SULFATE/0.9% NACL/PF 50 MG/5 ML
SYRINGE (ML) INTRAVENOUS AS NEEDED
Status: DISCONTINUED | OUTPATIENT
Start: 2022-01-12 | End: 2022-01-12 | Stop reason: HOSPADM

## 2022-01-12 RX ORDER — SODIUM CHLORIDE 0.9 % (FLUSH) 0.9 %
5-40 SYRINGE (ML) INJECTION EVERY 8 HOURS
Status: DISCONTINUED | OUTPATIENT
Start: 2022-01-12 | End: 2022-01-12 | Stop reason: HOSPADM

## 2022-01-12 RX ORDER — MIDAZOLAM HYDROCHLORIDE 1 MG/ML
2 INJECTION, SOLUTION INTRAMUSCULAR; INTRAVENOUS ONCE
Status: DISCONTINUED | OUTPATIENT
Start: 2022-01-12 | End: 2022-01-12 | Stop reason: HOSPADM

## 2022-01-12 RX ORDER — SODIUM CHLORIDE, SODIUM LACTATE, POTASSIUM CHLORIDE, CALCIUM CHLORIDE 600; 310; 30; 20 MG/100ML; MG/100ML; MG/100ML; MG/100ML
50 INJECTION, SOLUTION INTRAVENOUS CONTINUOUS
Status: DISCONTINUED | OUTPATIENT
Start: 2022-01-12 | End: 2022-01-12 | Stop reason: HOSPADM

## 2022-01-12 RX ORDER — LIDOCAINE HYDROCHLORIDE 20 MG/ML
INJECTION, SOLUTION EPIDURAL; INFILTRATION; INTRACAUDAL; PERINEURAL AS NEEDED
Status: DISCONTINUED | OUTPATIENT
Start: 2022-01-12 | End: 2022-01-12 | Stop reason: HOSPADM

## 2022-01-12 RX ADMIN — PHENYLEPHRINE HYDROCHLORIDE 100 MCG: 10 INJECTION INTRAVENOUS at 08:43

## 2022-01-12 RX ADMIN — DEXAMETHASONE SODIUM PHOSPHATE 4 MG: 4 INJECTION, SOLUTION INTRAMUSCULAR; INTRAVENOUS at 06:36

## 2022-01-12 RX ADMIN — Medication 10 MG: at 08:06

## 2022-01-12 RX ADMIN — Medication 2 G: at 07:07

## 2022-01-12 RX ADMIN — EPINEPHRINE 7 ML: 1 INJECTION, SOLUTION, CONCENTRATE INTRAVENOUS at 06:39

## 2022-01-12 RX ADMIN — FENTANYL CITRATE 100 MCG: 50 INJECTION, SOLUTION INTRAMUSCULAR; INTRAVENOUS at 06:29

## 2022-01-12 RX ADMIN — SODIUM CHLORIDE, SODIUM LACTATE, POTASSIUM CHLORIDE, AND CALCIUM CHLORIDE 75 ML/HR: 600; 310; 30; 20 INJECTION, SOLUTION INTRAVENOUS at 06:34

## 2022-01-12 RX ADMIN — MEPIVACAINE HYDROCHLORIDE 10 ML: 15 INJECTION, SOLUTION EPIDURAL; INFILTRATION at 06:36

## 2022-01-12 RX ADMIN — PHENYLEPHRINE HYDROCHLORIDE 100 MCG: 10 INJECTION INTRAVENOUS at 08:27

## 2022-01-12 RX ADMIN — PHENYLEPHRINE HYDROCHLORIDE 100 MCG: 10 INJECTION INTRAVENOUS at 08:33

## 2022-01-12 RX ADMIN — MIDAZOLAM 2 MG: 1 INJECTION INTRAMUSCULAR; INTRAVENOUS at 06:29

## 2022-01-12 RX ADMIN — DEXAMETHASONE SODIUM PHOSPHATE 2 MG: 4 INJECTION, SOLUTION INTRA-ARTICULAR; INTRALESIONAL; INTRAMUSCULAR; INTRAVENOUS; SOFT TISSUE at 06:39

## 2022-01-12 RX ADMIN — LIDOCAINE HYDROCHLORIDE 20 MG: 20 INJECTION, SOLUTION EPIDURAL; INFILTRATION; INTRACAUDAL; PERINEURAL at 07:26

## 2022-01-12 RX ADMIN — PHENYLEPHRINE HYDROCHLORIDE 100 MCG: 10 INJECTION INTRAVENOUS at 08:08

## 2022-01-12 RX ADMIN — ACETAMINOPHEN 1000 MG: 500 TABLET ORAL at 06:25

## 2022-01-12 RX ADMIN — PHENYLEPHRINE HYDROCHLORIDE 100 MCG: 10 INJECTION INTRAVENOUS at 08:15

## 2022-01-12 RX ADMIN — PROPOFOL 140 MCG/KG/MIN: 10 INJECTION, EMULSION INTRAVENOUS at 07:28

## 2022-01-12 RX ADMIN — ROPIVACAINE HYDROCHLORIDE 15 ML: 5 INJECTION, SOLUTION EPIDURAL; INFILTRATION; PERINEURAL at 06:39

## 2022-01-12 RX ADMIN — PHENYLEPHRINE HYDROCHLORIDE 100 MCG: 10 INJECTION INTRAVENOUS at 08:22

## 2022-01-12 RX ADMIN — ROPIVACAINE HYDROCHLORIDE 22 ML: 5 INJECTION, SOLUTION EPIDURAL; INFILTRATION; PERINEURAL at 06:36

## 2022-01-12 RX ADMIN — PROPOFOL 40 MG: 10 INJECTION, EMULSION INTRAVENOUS at 07:26

## 2022-01-12 RX ADMIN — Medication 10 MG: at 08:02

## 2022-01-12 NOTE — INTERVAL H&P NOTE
Update History & Physical    The Patient's History and Physical was reviewed   I discussed the surgery and patients medical condition with the patient. The chart was reviewed with the patient and I examined the patient. There was no change. The surgical site was confirmed by the patient and me. CV: RRR  RESP: CTAB    Plan:  The risk, benefits, expected outcome, and alternative to the recommended procedure have been discussed with the patient. Patient understands and wants to proceed with the procedure.     Electronically signed by Kiesha Stoddard MD on 01/12/22 6:51 AM

## 2022-01-12 NOTE — DISCHARGE INSTRUCTIONS
INSTRUCTIONS FOLLOWING FOOT SURGERY    ACTIVITY  Elevate foot. No Ice    No weight bearing. Use crutches or knee walker until seen in follow up appointment  Don't put anything into the splint to relieve itching etc.     Blood clot prevention:  As instructed by Dr Brittany Gavin: Take 81mg aspirin twice daily if ok with your medical doctor and you have no GI Ulcer. Get up and out of bed frequently. While in bed move the legs as much as possible)    DRESSING CARE Keep dry and in place until follow up appointment. Cover with cast bag or plastic bag when showering. CALL YOUR DOCTOR IF YOU HAVE  Excessive bleeding that does not stop after holding mild pressure over the area. Temperature of 101 degrees or above. Redness, excessive swelling or bruising, and/or green or yellow, smelly discharge from incision. Loss of sensation - cold, white or blue toes. DIET  Day of Surgery: Clear liquids until no nausea or vomiting; then light, bland diet (Baked chicken, plain rice, grits, scrambled egg, toast). Nothing Greasy, fried or spicy today  Advance to regular diet on second day, unless your doctor orders otherwise. PAIN  Take pain medications as directed by your doctor. Call your doctor if pain is NOT relieved by medication. PAIN MED SIDE EFFECTS  Constipation: Lots of fluids, try prune juice, then OTC stool softeners if necessary  Nausea: Take medication with food. MEDICATION INTERACTION:  During your procedure you potentially received a medication or medications which may reduce the effectiveness of oral contraceptives. Please consider other forms of contraception for 1 month following your procedure if you are currently using oral contraceptives as your primary form of birth control.  In addition to this, we recommend continuing your oral contraceptive as prescribed, unless otherwise instructed by your physician, during this time    After general anesthesia or intravenous sedation, for 24 hours or while taking prescription Narcotics:  · Limit your activities  · A responsible adult needs to be with you for the next 24 hours  · Do not drive and operate hazardous machinery  · Do not make important personal or business decisions  · Do not drink alcoholic beverages  · If you have not urinated within 8 hours after discharge, and you are experiencing discomfort from urinary retention, please go to the nearest ED. · If you have sleep apnea and have a CPAP machine, please use it for all naps and sleeping. · Please use caution when taking narcotics and any of your home medications that may cause drowsiness. *  Please give a list of your current medications to your Primary Care Provider. *  Please update this list whenever your medications are discontinued, doses are      changed, or new medications (including over-the-counter products) are added. *  Please carry medication information at all times in case of emergency situations. These are general instructions for a healthy lifestyle:  No smoking/ No tobacco products/ Avoid exposure to second hand smoke  Surgeon General's Warning:  Quitting smoking now greatly reduces serious risk to your health. Obesity, smoking, and sedentary lifestyle greatly increases your risk for illness  A healthy diet, regular physical exercise & weight monitoring are important for maintaining a healthy lifestyle    You may be retaining fluid if you have a history of heart failure or if you experience any of the following symptoms:  Weight gain of 3 pounds or more overnight or 5 pounds in a week, increased swelling in our hands or feet or shortness of breath while lying flat in bed. Please call your doctor as soon as you notice any of these symptoms; do not wait until your next office visit.

## 2022-01-12 NOTE — ANESTHESIA PROCEDURE NOTES
Peripheral Block    Start time: 1/12/2022 6:37 AM  End time: 1/12/2022 6:39 AM  Performed by: Cassandra Marinelli MD  Authorized by: Cassandra Marinelli MD       Pre-procedure: Indications: at surgeon's request and post-op pain management    Preanesthetic Checklist: patient identified, risks and benefits discussed, site marked, timeout performed, anesthesia consent given and patient being monitored    Timeout Time: 06:37 EST          Block Type:   Block Type: Adductor canal  Laterality:  Right  Monitoring:  Responsive to questions, continuous pulse ox, oxygen, frequent vital sign checks and heart rate  Injection Technique:  Single shot  Procedures: ultrasound guided    Patient Position: supine  Prep: chlorhexidine    Location:  Upper thigh  Needle Type:  Stimuplex  Needle Gauge:  20 G  Needle Localization:  Ultrasound guidance  Medication Injected:  Ropivacaine 0.5% with epinephrine 1:200,000 injection, 15 mL (Mixture components: EPINEPHrine HCl (PF) 1 mg/mL (1 mL) Soln, . 005 mL; ropivacaine (PF) 5 mg/mL (0.5 %) Soln, 1 mL)  mepivacaine 1.5% with epinephrine 1:200,000 injection, 7 mL (Mixture components: EPINEPHrine HCl (PF) 1 mg/mL (1 mL) Soln, . 005 mL; mepivacaine-pf 15 mg/mL (1.5 %) Soln, 1 mL)  dexamethasone (DECADRON) 4 mg/mL injection, 2 mg  Med Admin Time: 1/12/2022 6:39 AM    Assessment:  Number of attempts:  1  Injection Assessment:  Incremental injection every 5 mL, negative aspiration for CSF, no paresthesia, ultrasound image on chart, no intravascular symptoms, negative aspiration for blood and local visualized surrounding nerve on ultrasound  Patient tolerance:  Patient tolerated the procedure well with no immediate complications

## 2022-01-12 NOTE — ANESTHESIA PROCEDURE NOTES
Peripheral Block    Start time: 1/12/2022 6:29 AM  End time: 1/12/2022 6:36 AM  Performed by: Sinan Cabrera MD  Authorized by: Sinan Cabrera MD       Pre-procedure: Indications: at surgeon's request and post-op pain management    Preanesthetic Checklist: patient identified, risks and benefits discussed, site marked, timeout performed, anesthesia consent given and patient being monitored    Timeout Time: 06:29 EST          Block Type:   Block Type:  Popliteal  Laterality:  Right  Monitoring:  Responsive to questions, continuous pulse ox, oxygen, frequent vital sign checks and heart rate  Injection Technique:  Single shot  Procedures: ultrasound guided    Patient Position: left lateral decubitus  Prep: chlorhexidine    Location:  Lower thigh  Needle Type:  Stimuplex  Needle Gauge:  20 G  Needle Localization:  Ultrasound guidance  Medication Injected:  Ropivacaine 0.5% with epinephrine 1:200,000 injection, 22 mL (Mixture components: EPINEPHrine HCl (PF) 1 mg/mL (1 mL) Soln, . 005 mL; ropivacaine (PF) 5 mg/mL (0.5 %) Soln, 1 mL)  mepivacaine PF (CARBOCAINE) 1.5 % injection, 10 mL  dexamethasone (DECADRON) 4 mg/mL injection, 4 mg  Med Admin Time: 1/12/2022 6:36 AM    Assessment:  Number of attempts:  1  Injection Assessment:  Incremental injection every 5 mL, negative aspiration for CSF, no paresthesia, ultrasound image on chart, no intravascular symptoms, negative aspiration for blood and local visualized surrounding nerve on ultrasound  Patient tolerance:  Patient tolerated the procedure well with no immediate complications

## 2022-01-12 NOTE — ANESTHESIA POSTPROCEDURE EVALUATION
Procedure(s):  RIGHT ANKLE ARTHROSCOPY WITH PERONEAL TENDON REPAIR.    total IV anesthesia, general - backup    Anesthesia Post Evaluation      Multimodal analgesia: multimodal analgesia used between 6 hours prior to anesthesia start to PACU discharge  Patient location during evaluation: PACU  Patient participation: complete - patient participated  Level of consciousness: awake  Pain management: adequate  Airway patency: patent  Anesthetic complications: no  Cardiovascular status: acceptable  Respiratory status: acceptable, spontaneous ventilation and nonlabored ventilation  Hydration status: acceptable  Post anesthesia nausea and vomiting:  none      INITIAL Post-op Vital signs:   Vitals Value Taken Time   /63 01/12/22 0947   Temp 36.6 °C (97.8 °F) 01/12/22 0942   Pulse 106 01/12/22 0951   Resp 16 01/12/22 0947   SpO2 85 % 01/12/22 0951   Vitals shown include unvalidated device data.

## 2022-01-12 NOTE — ANESTHESIA PREPROCEDURE EVALUATION
Relevant Problems   No relevant active problems       Anesthetic History               Review of Systems / Medical History  Patient summary reviewed, nursing notes reviewed and pertinent labs reviewed    Pulmonary  Within defined limits                 Neuro/Psych         Psychiatric history (anxiety)     Cardiovascular  Within defined limits                Exercise tolerance: >4 METS     GI/Hepatic/Renal  Within defined limits              Endo/Other        Morbid obesity     Other Findings              Physical Exam    Airway  Mallampati: I      Mouth opening: Normal     Cardiovascular  Regular rate and rhythm,  S1 and S2 normal,  no murmur, click, rub, or gallop             Dental  No notable dental hx       Pulmonary  Breath sounds clear to auscultation               Abdominal         Other Findings            Anesthetic Plan    ASA: 3  Anesthesia type: total IV anesthesia and general - backup - femoral single shot and popliteal fossa block      Post-op pain plan if not by surgeon: peripheral nerve block single    Induction: Intravenous  Anesthetic plan and risks discussed with: Patient and Spouse

## 2022-01-12 NOTE — OP NOTES
Operative Note    Patient:Mary Jo Menendez  MRN: [id number]    Date Of Surgery: 1/12/2022    Surgeon: Pamela Gauthier MD    Assistant Surgeon: None    Procedure Performed:   Right Ankle Arthroscopy - limited debridement  Right Peroneal Brevis tendon repair  Right Peroneal Longus tenosynovectomy    Pre Op Diagnosis:  Right Peroneal Brevis Tear  Right peroneal tendonitis  Right Ankle Deltoid Tear  Right ATFL & CFL tear w/ Impingement      Post Op Diagnosis:   same    Implants:   * No implants in log *    Anesthesia:  General w/ pre op Pop saph block    Blood Loss:  -    Tourniquet:  Estimated calf tourniquet 250mmHg for 38 minutes    Complications:  -    Pre Operative Abx:   Ancef 2g    Specimens/Cultures:  -    Significant Findings:  Arthroscopy revealed tenosynovitis with inflammation and obvious tissue impingement of the Anterior Lateral ankle. There is a large amount of anterior lateral scar and obvious impingement. Significant amount of hypertrophied tissue. There is also 2 large plicas. One of the central ankle and 1 over the medial ankle. Peroneal Brevis had a 2 cm split tear that was repaired. Peroneal Longus significant tenosynovitis. Pre Operative Course:  Karen Christiansen is a 37 y.o. female who sustained an injury 8/31/21. She has not responded well to non operative treatment. MRI shows peroneal tendon tear and obvious ankle impingement. After PT and bracing she continued to have peroneal and anterior lateral ankle pain but no instability. Operation In Detail:  Patient was evaluated in the preoperative area. The right lower extremity was marked by me. We had a long discussion about the procedure and postoperative protocols. The patient was then brought back to the operating room suite and placed in the operating room table. A timeout was taken to identify the patient, procedure being performed, and laterality.   After this the patient was prepped and draped in the normal sterile fashion using a Betadine solution and/or a ChloraPrep solution. A timeout was then taken to identify the patient his name, date of birth, laterality, and procedure being performed. We also identified allergies and any concerns about the operation. Attention was then placed to the operative extremity. No ankle distractor was used for this procedure. Next I identified the anterior tibial tendon. At the level of the ankle joint, just medial to the anterior tibial tendon, I inserted a a 18-gauge needle and injected 10 cc of normal saline. Then using a 15 blade knife and made a small incision over the skin only. Using the nick and spread technique I then grabbed a hemostat and bluntly dissected down to the joint capsule. I then penetrated the capsule with the hemostat and a gush of fluid was identified. I then inserted the blunt tipped trochar into the joint, followed by the scope. I inspected the joint but felt there was too much hazy material so I next made a lateral portal.  While visualizing with the scope within the ankle joint, I inserted an 18 gauge spinal needle in the anterior lateral ankle. This was done by transillumination technique to avoid neural or vascular structures. I did not identify any vascular sutures. I watched this needle enter the joint at the level of the joint and decided that it was placed well. I then inspected the skin around the 18 gauge needle. A branch of the SPN nerve was identified just lateral to needle. I made a small nick in the skin with a 15 blade knife medial to the identified SPN branch. Again using the nick and spread technique, dissected down to the joint capsule with a hemostat. As I inserted the hemostat and the capsule I visualized it on arthroscopic cameras. A rush of fluid was seen out of this hole at the skin, and I made sure not to damage any of the chondral surface. Through this portal I inserted a probe.     Immediately upon entering the ankle there was a large central plica noted 1 band extending from the anterior tibial plafond and down to the talar neck. Using a shaver through the lateral portal I removed this plica in full. We then worked across to the lateral ankle. There was another plica noted over the anterior lateral ankle extending from the tibial plafond down to the talar neck. I also remove this plica. Now with better visualization of the ankle there is significant amount of inflamed tenosynovium over the anterior ankle and medial and lateral ankle joint. I used a shaver and remove this inflamed synovium from the joint. After removal of the synovium I examined the articular surface. The medial central and lateral tibial plafond and talar dome had no signs of chondral damage. Over the lateral, anterior lateral ankle and there was a significant amount of thick tissue. The ATFL was hypertrophied with obvious tearing and scarring. This large amount of tissue was adherent to the anterior lateral ankle and limited normal ankle motion. It was draped over the articular surface and prevented entry into the lateral gutter. I then took a biter and bit this hypertrophied ATFL. After I bit the ATFL I used a shaver and excised a significant amount of scar and inflammation. The shaver broke. Therefore I removed the shaver, and removed the broken bit that remained in the joint. This is only the outer sleeve of the shaver. I then inserted a new shaver. I was able to remove any type of broken metal that happened in the shaver brought. There was no signs of any at retained shaver parts. I then removed more inflamed and scarred tissue over the lateral ankle. This took me back to a more normal looking ATFL. I then can examine the lateral gutter. There is no signs of chondral damage in the lateral gutter. I then performed a talar tilt test under direct fluoroscopy. There is no signs of excessive talar tilt.   Anterior drawer showed a stable ankle with no signs of anterior translation of the talus that was abnormal.  I then did a final inspection of the joint and the articular surface. There is no signs of chondral damage. No signs of remaining scar tissue or impingement. There was no signs retained instruments. The operative leg was identified. Using a 15 blade I cute through the skin on the posterior 1/3 of the fibula, extending distally over the course of the peroneal tendons. I sharply dissected down to the level of the lateral ankle ligaments and the peroneal tendon sheath. I incised the peroneal tendon sheath behind the distal fibula and inspected both tendons. The Peroneal Longus tendon was first identified and it had no tears. There was a moderate amount of inflamed tenosynovium around it. Using scissors and a knife I excised the inflamed tenosynovium around the peroneal longus. This completed my tenosynovectomy    The Peroneal Brevis tendon was first identified and it had 1 partial thickness 2 cm tear extending from the infra malleoli region just distal to the lateral malleolus. I took scissors and a knife to excise the frayed and damaged tendon. I then took a 3-0 FiberWire suture and through a suture at the apex of the tear and buried the knot within the tear and then ran the suture proximally. This running stitch was used to close down the peroneal brevis tear. I then tied the knot proximally and buried the knot deep within the tendon. This completed moderate flexor tendon repair. I irrigated out the peroneal tendons and then repaired the peroneal tenon sheath with a vicryl suture. I irrigated out the wound with sterile saline-betadine solution, closed the skin with monocryl sub q and nylon for the skin. A sterile dressing was then applied to the leg and Posterior slab splint with Stirrups. They were awoken from anesthesia and returned to the PACU without difficulty.     Post Operative Plan:   1- WB status: Non-Weight Bearing   2- Follow Up: 2-3 weeks  3- Immobilization/assistive devices: brace/splint  4- DVT px: asa 81mg po BID  5- Pain Medication: Percocet 5mg/325 q6 PRN pain #30

## 2022-01-12 NOTE — PERIOP NOTES
Discharge instructions discussed with  Gonsalo Childs at bedside, no questions or concerns at this time. Hard copy of instructions given to , prescriptions escribed.

## 2022-03-19 PROBLEM — E66.01 OBESITY, MORBID (HCC): Status: ACTIVE | Noted: 2020-08-04

## 2022-03-19 PROBLEM — S63.502A SPRAIN OF LEFT WRIST: Status: ACTIVE | Noted: 2021-09-02

## 2022-03-19 PROBLEM — M25.532 LEFT WRIST PAIN: Status: ACTIVE | Noted: 2021-09-02

## 2022-04-13 PROBLEM — G56.03 BILATERAL CARPAL TUNNEL SYNDROME: Status: ACTIVE | Noted: 2022-04-13

## 2022-04-28 ENCOUNTER — HOSPITAL ENCOUNTER (OUTPATIENT)
Dept: LAB | Age: 42
Discharge: HOME | End: 2022-04-28
Attending: NURSE PRACTITIONER
Payer: COMMERCIAL

## 2022-04-28 DIAGNOSIS — M06.9 RHEUMATOID ARTHRITIS, INVOLVING UNSPECIFIED SITE, UNSPECIFIED WHETHER RHEUMATOID FACTOR PRESENT (HCC): ICD-10-CM

## 2022-04-28 DIAGNOSIS — G56.03 BILATERAL CARPAL TUNNEL SYNDROME: ICD-10-CM

## 2022-04-28 LAB
ALBUMIN SERPL-MCNC: 3.6 G/DL (ref 3.5–5)
ALBUMIN/GLOB SERPL: 0.8 {RATIO} (ref 1.2–3.5)
ALP SERPL-CCNC: 85 U/L (ref 50–130)
ALT SERPL-CCNC: 21 U/L (ref 12–65)
ANION GAP SERPL CALC-SCNC: 8 MMOL/L (ref 7–16)
AST SERPL-CCNC: 17 U/L (ref 15–37)
BASOPHILS # BLD: 0 K/UL (ref 0–0.2)
BASOPHILS NFR BLD: 0 % (ref 0–2)
BILIRUB SERPL-MCNC: 0.4 MG/DL (ref 0.2–1.1)
BUN SERPL-MCNC: 11 MG/DL (ref 6–23)
CALCIUM SERPL-MCNC: 9.2 MG/DL (ref 8.3–10.4)
CHLORIDE SERPL-SCNC: 104 MMOL/L (ref 98–107)
CO2 SERPL-SCNC: 25 MMOL/L (ref 21–32)
CREAT SERPL-MCNC: 0.75 MG/DL (ref 0.6–1)
CRP SERPL-MCNC: 2.7 MG/DL (ref 0–0.9)
DIFFERENTIAL METHOD BLD: ABNORMAL
EOSINOPHIL # BLD: 0.2 K/UL (ref 0–0.8)
EOSINOPHIL NFR BLD: 2 % (ref 0.5–7.8)
ERYTHROCYTE [DISTWIDTH] IN BLOOD BY AUTOMATED COUNT: 15 % (ref 11.9–14.6)
ERYTHROCYTE [SEDIMENTATION RATE] IN BLOOD: 79 MM/HR (ref 0–20)
GLOBULIN SER CALC-MCNC: 4.4 G/DL (ref 2.3–3.5)
GLUCOSE SERPL-MCNC: 81 MG/DL (ref 65–100)
HCT VFR BLD AUTO: 37.3 % (ref 35.8–46.3)
HGB BLD-MCNC: 11.5 G/DL (ref 11.7–15.4)
IMM GRANULOCYTES # BLD AUTO: 0 K/UL (ref 0–0.5)
IMM GRANULOCYTES NFR BLD AUTO: 0 % (ref 0–5)
LYMPHOCYTES # BLD: 1.7 K/UL (ref 0.5–4.6)
LYMPHOCYTES NFR BLD: 19 % (ref 13–44)
MCH RBC QN AUTO: 25 PG (ref 26.1–32.9)
MCHC RBC AUTO-ENTMCNC: 30.8 G/DL (ref 31.4–35)
MCV RBC AUTO: 81.1 FL (ref 79.6–97.8)
MONOCYTES # BLD: 0.5 K/UL (ref 0.1–1.3)
MONOCYTES NFR BLD: 6 % (ref 4–12)
NEUTS SEG # BLD: 6.2 K/UL (ref 1.7–8.2)
NEUTS SEG NFR BLD: 72 % (ref 43–78)
NRBC # BLD: 0 K/UL (ref 0–0.2)
PLATELET # BLD AUTO: 189 K/UL (ref 150–450)
PMV BLD AUTO: 13.6 FL (ref 9.4–12.3)
POTASSIUM SERPL-SCNC: 3.9 MMOL/L (ref 3.5–5.1)
PROT SERPL-MCNC: 8 G/DL (ref 6.3–8.2)
RBC # BLD AUTO: 4.6 M/UL (ref 4.05–5.2)
SODIUM SERPL-SCNC: 137 MMOL/L (ref 136–145)
URATE SERPL-MCNC: 4.4 MG/DL (ref 2.6–6)
WBC # BLD AUTO: 8.6 K/UL (ref 4.3–11.1)

## 2022-04-28 PROCEDURE — 85025 COMPLETE CBC W/AUTO DIFF WBC: CPT

## 2022-04-28 PROCEDURE — 86140 C-REACTIVE PROTEIN: CPT

## 2022-04-28 PROCEDURE — 86200 CCP ANTIBODY: CPT

## 2022-04-28 PROCEDURE — 86431 RHEUMATOID FACTOR QUANT: CPT

## 2022-04-28 PROCEDURE — 85652 RBC SED RATE AUTOMATED: CPT

## 2022-04-28 PROCEDURE — 84550 ASSAY OF BLOOD/URIC ACID: CPT

## 2022-04-28 PROCEDURE — 36415 COLL VENOUS BLD VENIPUNCTURE: CPT

## 2022-04-28 PROCEDURE — 86038 ANTINUCLEAR ANTIBODIES: CPT

## 2022-04-28 PROCEDURE — 81374 HLA I TYPING 1 ANTIGEN LR: CPT

## 2022-04-28 PROCEDURE — 80053 COMPREHEN METABOLIC PANEL: CPT

## 2022-04-30 LAB
ANA SER QL: NEGATIVE
RHEUMATOID FACT SERPL-ACNC: 157.7 IU/ML (ref 0–15)

## 2022-05-03 LAB — CCP IGA+IGG SERPL IA-ACNC: >250 UNITS (ref 0–19)

## 2022-05-05 LAB — HLA-B27 QL NAA+PROBE: NEGATIVE

## 2022-06-01 ENCOUNTER — OFFICE VISIT (OUTPATIENT)
Dept: ORTHOPEDIC SURGERY | Age: 42
End: 2022-06-01
Payer: COMMERCIAL

## 2022-06-01 DIAGNOSIS — G56.01 RIGHT CARPAL TUNNEL SYNDROME: Primary | ICD-10-CM

## 2022-06-01 PROCEDURE — 20526 THER INJECTION CARP TUNNEL: CPT | Performed by: NURSE PRACTITIONER

## 2022-06-01 PROCEDURE — 99204 OFFICE O/P NEW MOD 45 MIN: CPT | Performed by: NURSE PRACTITIONER

## 2022-06-01 RX ORDER — METHYLPREDNISOLONE ACETATE 40 MG/ML
40 INJECTION, SUSPENSION INTRA-ARTICULAR; INTRALESIONAL; INTRAMUSCULAR; SOFT TISSUE ONCE
Status: COMPLETED | OUTPATIENT
Start: 2022-06-01 | End: 2022-06-01

## 2022-06-01 RX ADMIN — METHYLPREDNISOLONE ACETATE 40 MG: 40 INJECTION, SUSPENSION INTRA-ARTICULAR; INTRALESIONAL; INTRAMUSCULAR; SOFT TISSUE at 15:31

## 2022-06-01 NOTE — PROGRESS NOTES
Orthopaedic Hand Surgery Note    Name: Irish Velasquez  YOB: 1980  Gender: female  MRN: [id number]    CC: Follow up of hand numbness    HPI: Patient is a 37 y.o. female who is here regarding follow up for  hand numbness and tingling. She is here to discuss her EMG and lab results. Her labs came back positive for RA. A rheumatology referral was placed. Her EMG from Dr. Lindy Lara showed right carpal tunnel syndrome. ROS/Meds/PSH/PMH/FH/SH: I personally reviewed the patients standard intake form. Pertinents are discussed in the HPI    Physical Examination:  Musculoskeletal:   Cervical spine has normal range of motion without tenderness to palpation, negative Spurling's test. Shoulders and elbows have normal pain free range of motion. Examination of the right upper extremity demonstrates Decreased sensation to light touch in the median distribution, normal sensation in ulnar and radial distribution, Positive carpal tunnel compression testing and Phalen testing, cap refill < 5 seconds in all fingers. Inspection reveals no thenar atrophy. Negative Tinel and elbow flexion compression test of the cubital tunnel, negative Tinel over Guyon's canal. Sensation to light touch in the ulnar 2 digits is normal with no intrinsic atrophy/weakness. No tenderness to palpation or masses noted in the forearm. Imaging / Electrodiagnostic Tests:     EMGS  From Dr. Lindy Lara are reviewed. She has grade 2, mild, right carpal tunnel syndrome    Assessment:     ICD-10-CM    1. Right carpal tunnel syndrome  G56.01        Plan:  We discussed the diagnosis and different treatment options. We discussed observation, EMG/NCV, night splinting, cortisone injections and surgical decompression and the risks and benefits of all were clearly outlined. After discussing in detail the patient elects to proceed with right carpal tunnel injection. She will continue with her braces.   We will give her a note for school. We will see her back on as-needed basis. She has an appointment with the rheumatologist.  We will make a referral to a PCP in the [facility] system. Procedure Note    The risk, benefits and alternatives of injection and no injection therapy were discussed. Risks including skin blanching, subcutaneous fat atrophy, and elevations in blood glucose levels were discussed. The patient consented for an injection. The patient has been identified by name and birthdate. The injection site was identified, marked and prepped with alcohol swabs. Time out completed. The right carpal tunnel was injected with 1ml of 6mg/ml Betamethasone and 1ml Lidocaine plain 1%. The injection site was then dressed with a bandaid. The patient tolerated the injection well. The patient was instructed to monitor their blood sugars if diabetic and call if any concerns. The patient was instructed to call the office if any adverse local effects occurred or any if any questions or concerns arise. Patient voiced accordance and understanding of the information provided and the formulated plan. All questions were answered to the patient's satisfaction during the encounter.     4 This is a chronic illness with exacerbation, progression, or side effect of treatment  Treatment at this time: Injection and braces    HARMONY Young CNP  Orthopaedic Surgery  06/01/22  3:29 PM

## 2022-06-01 NOTE — LETTER
[address] [facility]  [address] [facility]. [facility] [address] [address]  Phone: [phone]  Fax: [phone]    Lacretia Jeans, APRN - CNP        June 1, 2022     Patient: Sherry Wesley   YOB: 1980   Date of Visit: 6/1/2022       To Whom it May Concern:    Antonia Maxwell was seen in my clinic on 6/1/2022. Please allow extension for Work that is due at this time. If you have any questions or concerns, please don't hesitate to call.     Sincerely,         Lacretia Jeans, APRN - CNP

## 2022-06-01 NOTE — LETTER
[address] [address]  Phone: [phone]  Fax: [phone]    HARMONY Carmona CNP        June 1, 2022     Patient: Martin Moe   YOB: 1980   Date of Visit: 6/1/2022       To Whom it May Concern:    Zofia Zambrano was seen in my clinic on 6/1/2022. Please excuse her from class on 6/1/2022. If you have any questions or concerns, please don't hesitate to call.     Sincerely,         HARMONY Carmona CNP

## 2022-06-02 ENCOUNTER — TELEPHONE (OUTPATIENT)
Dept: ORTHOPEDIC SURGERY | Age: 42
End: 2022-06-02

## 2022-06-02 NOTE — TELEPHONE ENCOUNTER
Patient is asking in her EMG/NCV report can be pushed to cathie? She needs to show to her employer and her hands are to painful from the injections yesterday she is not able to drive to come get that? I told her I would have to ask. She said she had been able to see but now they have disappeared?

## 2022-06-13 ENCOUNTER — OFFICE VISIT (OUTPATIENT)
Dept: ORTHOPEDIC SURGERY | Age: 42
End: 2022-06-13
Payer: COMMERCIAL

## 2022-06-13 DIAGNOSIS — M25.511 ACUTE PAIN OF RIGHT SHOULDER: ICD-10-CM

## 2022-06-13 DIAGNOSIS — M25.512 ACUTE PAIN OF LEFT SHOULDER: Primary | ICD-10-CM

## 2022-06-13 PROCEDURE — 99214 OFFICE O/P EST MOD 30 MIN: CPT | Performed by: SPECIALIST/TECHNOLOGIST

## 2022-06-13 PROCEDURE — A4565 SLINGS: HCPCS | Performed by: SPECIALIST/TECHNOLOGIST

## 2022-06-13 RX ORDER — DICLOFENAC SODIUM 75 MG/1
75 TABLET, DELAYED RELEASE ORAL 2 TIMES DAILY
Qty: 30 TABLET | Refills: 1 | Status: SHIPPED | OUTPATIENT
Start: 2022-06-13

## 2022-06-13 RX ORDER — METHYLPREDNISOLONE 4 MG/1
TABLET ORAL
Qty: 1 KIT | Refills: 0 | Status: SHIPPED | OUTPATIENT
Start: 2022-06-13

## 2022-06-13 RX ORDER — LORAZEPAM 1 MG/1
1 TABLET ORAL ONCE
Qty: 1 TABLET | Refills: 0 | Status: SHIPPED | OUTPATIENT
Start: 2022-06-13 | End: 2022-06-13

## 2022-06-13 NOTE — LETTER
DME Patient Authorization Form    Name: Denise Jeong  : 1980  MRN: [id number]   Age: 37 y.o. Gender: female  Delivery Address: [facility]     Diagnosis:     ICD-10-CM    1. Acute pain of right shoulder  M25.511 XR SHOULDER BILATERAL STANDARD   2. Acute pain of left shoulder  M25.512 XR SHOULDER BILATERAL STANDARD     MRI SHOULDER LEFT WO CONTRAST        Requested DME:  Arm Sling (LEFT)        Clinical Notes:     **Indicates non-covered items by insurance. Payment expected on date of service. Electronically signed by  Provider ________________________     Date: ______________________                             [facility] Tax ID # [id number]        Durable Medical Equipment and/or Orthotics Patient Consent     I understand that my physician has prescribed this medical supply as part of my treatment plan as a matter of Medical Necessity.  I understand that I have a choice in where I receive my prescribed orthopedic supplies and/or services.  I authorize [facility] to furnish this service/product and to provide my insurance carrier with any information requested in order to process for payment.  I instruct my insurance carrier to pay [facility] directly for these services/products.  I understand that my insurance carrier may deny payment for this supply because it is a non-covered item, deemed not medically necessary or considered experimental.   I understand that any cost not covered by my insurance carrier will be solely my financial responsibility.  I have received the Supplier Standards and have reviewed them.  I have received the prescribed item and have been fully instructed on the proper use of the above services/products.    ______ (Patient Initials) I understand that all DME items are non-returnable after being dispensed.  Items still in sealed packaging may be returned up to 14 days after purchasing. [address] will replace items that are defective.    ______ (Patient Initials) I understand that Eva Figueroa will not file a claim with my insurance carrier for this service/product and I am waiving my right to file a claim on my own for this service/product with my insurance company as this item is NON-COVERED (Denoted by the **) by my Insurance company/policy. ______ (Patient Initials) I understand that I am responsible to bring my equipment to the hospital for any surgery. ______________________________________________  ________________________  Patient / Lawayne Guard            Thank you for considering [address] Your physician has prescribed specific medical equipment or devices for your home use. The following describes your rights and responsibilities as our customer. Right to Choose Providers: You have a choice regarding which company supplies your home medical equipment and devices, and to consult your physician in this decision. You may choose a medical supply store, a home medical equipment provider, or a specialist such as [facility]. [facility] will coordinate with your physician to provide the medical equipment or devices prescribed for your home use. Right to Service:  You have the right to considerate, respectful and nondiscriminatory care. You have the right to receive accurate and easily understood information about your health care. If you speak a foreign language, or don't understand the discussions, assistance will be provided to allow you to make informed health care decisions. You have the right to know your treatment options and to participate in decisions about your care, including the right to accept or refuse treatment.   You have the right to expect a reasonable response to your requests for treatment or service. You have the right to talk in confidence with health care providers and to have your health care information protected. You have the right to receive an explanation of your bill. You have the right to complain about the service or product you receive. Patient Responsibilities:  Please provide complete and accurate information about your health insurance benefits and make arrangements for the timely payment of your bill. [facility] will, if possible, assume responsibility for billing your insurance (Medicare, Medicaid and commercial) for the prescribed equipment or devices. If your policy does not cover the prescribed product, or only covers a portion of the bill, you are responsible for any remaining balance. Return and Exchange Policy:  [facility] will honor published  Warranties for products. [facility] will accept returns or exchanges within 14 days from the date of receipt, providin) the product must be in new condition; 2) receipt as required; and 3) used disposable and hygiene products may only be returned due to a defective product. Note: Refunds will be issued in a timely manner, please allow 4-6 weeks for processing. Complaint Procedures and DME Consumer Protection Resources:  [facility] values you as a customer, and is committed to resolving patient concerns. This commitment includes understanding and documenting your concerns, conducting a review of internal procedures, and providing you with an explanation and resolution to your concerns. Should you have any questions about our services or billing process, please contact our office at (practice phone number). If we are unable to resolve the concern, you have the right to direct comments to the office of Consumer Protection, in the [address] or the [facility] office, without fear of repercussion.     [address] [facility]    A supplier must be in compliance with all applicable Federal and State licensure and regulatory requirements. A supplier must provide complete and accurate information on the DMEPOS supplier application. Any changes to this information must be reported to the [facility] within 30 days. An authorized individual (one whose signature is binding) must sign the application for billing privileges. A supplier must fill orders from its own inventory, or must contract with other companies for the purchase of items necessary to fill the order. A supplier may not contract with any entity that is currently excluded from the Medicare program, any [facility] program, or from any other Federal procurement or Nonprocurement programs. A supplier must advise beneficiaries that they may rent or purchase inexpensive or routinely purchased durable medical equipment, and of the purchase option for capped rental equipment. A supplier must notify beneficiaries of warranty coverage and honor all warranties under applicable State Law, and repair or replace free of charge Medicare covered items that are under warranty. A supplier must maintain a physical facility on an appropriate site. A supplier must permit CMS, or its agents to conduct on-site inspections to ascertain the supplier's compliance with these standards. The supplier location must be accessible to beneficiaries during reasonable business hours, and must maintain a visible sign and posted hours of operation. A supplier must maintain a primary business telephone listed under the name of the business in a [employer] or a toll free number available through directory assistance. The exclusive use of a beeper, answering machine or cell phone is prohibited. A supplier must have comprehensive liability insurance in the amount of at least $300,000 that covers both the supplier's place of business and all customers and employees of the supplier.   If the supplier manufactures its own items, this insurance must also cover product liability and completed operations. A supplier must agree not to initiate telephone contact with beneficiaries, with a few exceptions allowed. This standard prohibits suppliers from calling beneficiaries in order to solicit new business. A supplier is responsible for delivery and must instruct beneficiaries on use of Medicare covered items, and maintain proof of delivery. A supplier must answer questions, and respond to complaints of the beneficiaries, and maintain documentation of such contacts. A supplier must maintain and replace at no charge or repair directly, or through a service contract with another company, Medicare covered items it has rented to beneficiaries. A supplier must accept returns of substandard (less than full quality for the particular item) or unsuitable items (inappropriate for the beneficiary at the time it was fitted and rented or sold) from beneficiaries. A supplier must disclose these supplier standards to each beneficiary to whom it supplies a Medicare-covered item. A supplier must disclose to the government any person having ownership, financial, or control interest in the supplier. A supplier must not convey or reassign a supplier number; i.e., the supplier may not sell or allow another entity to use its Medicare billing number. A supplier must have a complaint resolution protocol established to address beneficiary complaints that relate to these standards. A record of these complaints must be maintained at the physical facility. Complaint records must include: the name, address, telephone number and health insurance claim number of the beneficiary, a summary of the complaint, and any action taken to resolve it. A supplier must agree to furnish CMS any information required by the Medicare statute and implementing regulations.   A supplier of DMEPOS and other items and services must be accredited by a CMS-approved accreditation organization in order to receive and retain a supplier billing number. The accreditation must indicate the specific products and services, for which the supplier is accredited in order for the supplier to receive payment for those specific products and services. A DMEPOS supplier must notify their accreditation organization when a new DMEPOS location is opened. The accreditation organization may accredit the new supplier location for three months after it is operational without requiring a new site visit. All DMEPOS supplier locations, whether owned or subcontracted, must meet the [employer] and be separately accredited in order to bill Medicare. An accredited supplier may be denied enrollment or their enrollment may be revoked, if CMS determines that they are not in compliance with the DMEPOS quality standards. A DMEPOS supplier must disclose upon enrollment all products and services, including the addition of new product lines for which they are seeking accreditation. If a new product line is added after enrollment, the DMEPOS supplier will be responsible for notifying the accrediting body of the new product so that the DMEPOS supplier can be re-surveyed and accredited for these new products. Must meet the surety bond requirements specified in 42 C. F.R. 424.57(c). Implementation date- May 4, 2009. A supplier must obtain oxygen from a state-licensed oxygen supplier. A supplier must maintain ordering and referring documentation consistent with provisions found in 42 C. F.R. 424.516(f). DMEPOS suppliers are prohibited from sharing a practice location with certain other Medicare providers and suppliers. DMEPOS suppliers must remain open to the public for a minimum of 30 hours per week with certain exceptions.

## 2022-06-13 NOTE — PROGRESS NOTES
Patient was fitted and instructed on the use of Arm Sling for the left shoulder. Patient is aware the arm should fit comfortably in the sling with the elbow as far back as possible. I explained the thumb should be placed in the thumb loop to help prevent the arm from sliding out of the sling. Patient was instructed that the shoulder strap should cross over the opposite shoulder continuing threw the loop attached to the sling and then velcro back on the shoulder strap. Patient read and signed documenting they understand and agree to [facility]'s current DME return policy.
limited range of motion. Positive [employer]. Positive drop arm test.      Imaging:   Shoulder XR: Grashey, Axillary and Scapula Yviews     Clinical Indication:  1. Acute pain of right shoulder    2. Acute pain of left shoulder           Report: Grashey, Axillary and Scapula Y XRs of the Bilateral shoulder demonstrates no acute fracture dislocation or advanced degenerative change    Impression: No acute findings as above       All imaging interpreted by myself KASSIE Palafox independent of radiology review        Assessment:   1. Acute pain of right shoulder    2. Acute pain of left shoulder         Plan:   I think the majority of the patient's right shoulder pain is due to subacromial impingement however it appears to be resolving and is of minimal concern to the patient today. She does have significant pain in the left shoulder out of proportion with exam.  Despite not having any mechanism of injury the patient does appear to have significant limitation due to pain and I cannot rule out a significant injury to her shoulder. It is possible that the patient may have a flare from RA or septic arthritis however I have low concern for this due to lack of any systemic symptoms or recent illness. I will order an MRI to evaluate this further and she will follow-up after MRI result for definitive treatment. I will also prescribe her a Medrol Dosepak to help control her inflammatory symptoms and provide her with a sling for comfort today.       KASSIE Palafox  Orthopaedics and Sports Medicine

## 2022-06-16 ENCOUNTER — HOSPITAL ENCOUNTER (OUTPATIENT)
Dept: GENERAL RADIOLOGY | Age: 42
Discharge: HOME | End: 2022-06-19
Payer: COMMERCIAL

## 2022-06-16 DIAGNOSIS — M05.79 RHEU ARTHRITIS W RHEU FACTOR MULT SITE W/O ORG/SYS INVOLV (HCC): ICD-10-CM

## 2022-06-16 PROCEDURE — 73120 X-RAY EXAM OF HAND: CPT

## 2022-06-16 PROCEDURE — 73620 X-RAY EXAM OF FOOT: CPT

## 2022-06-16 PROCEDURE — 73600 X-RAY EXAM OF ANKLE: CPT

## 2022-06-20 ENCOUNTER — TELEPHONE (OUTPATIENT)
Dept: ORTHOPEDIC SURGERY | Age: 42
End: 2022-06-20

## 2022-06-20 DIAGNOSIS — M25.512 ACUTE PAIN OF LEFT SHOULDER: Primary | ICD-10-CM

## 2022-06-20 DIAGNOSIS — M25.512 ACUTE PAIN OF LEFT SHOULDER: ICD-10-CM

## 2022-06-20 LAB
BASOPHILS # BLD: 0.1 K/UL (ref 0–0.2)
BASOPHILS NFR BLD: 1 % (ref 0–2)
CRP SERPL-MCNC: 3.2 MG/DL (ref 0–0.9)
DIFFERENTIAL METHOD BLD: ABNORMAL
EOSINOPHIL # BLD: 0.2 K/UL (ref 0–0.8)
EOSINOPHIL NFR BLD: 2 % (ref 0.5–7.8)
ERYTHROCYTE [DISTWIDTH] IN BLOOD BY AUTOMATED COUNT: 15.9 % (ref 11.9–14.6)
ERYTHROCYTE [SEDIMENTATION RATE] IN BLOOD: 103 MM/HR (ref 0–20)
HCT VFR BLD AUTO: 36.8 % (ref 35.8–46.3)
HGB BLD-MCNC: 11.4 G/DL (ref 11.7–15.4)
IMM GRANULOCYTES # BLD AUTO: 0 K/UL (ref 0–0.5)
IMM GRANULOCYTES NFR BLD AUTO: 0 % (ref 0–5)
LYMPHOCYTES # BLD: 2.4 K/UL (ref 0.5–4.6)
LYMPHOCYTES NFR BLD: 22 % (ref 13–44)
MCH RBC QN AUTO: 24.5 PG (ref 26.1–32.9)
MCHC RBC AUTO-ENTMCNC: 31 G/DL (ref 31.4–35)
MCV RBC AUTO: 79 FL (ref 79.6–97.8)
MONOCYTES # BLD: 0.9 K/UL (ref 0.1–1.3)
MONOCYTES NFR BLD: 8 % (ref 4–12)
NEUTS SEG # BLD: 7.4 K/UL (ref 1.7–8.2)
NEUTS SEG NFR BLD: 68 % (ref 43–78)
NRBC # BLD: 0 K/UL (ref 0–0.2)
PLATELET # BLD AUTO: 231 K/UL (ref 150–450)
PMV BLD AUTO: 14 FL (ref 9.4–12.3)
RBC # BLD AUTO: 4.66 M/UL (ref 4.05–5.2)
WBC # BLD AUTO: 10.9 K/UL (ref 4.3–11.1)

## 2022-06-20 NOTE — TELEPHONE ENCOUNTER
Reviewed MRI results with patient and recommend at this time she have blood work done to rule out a septic joint. Orders were placed and she will continue with scheduled follow-up.

## 2022-06-20 NOTE — TELEPHONE ENCOUNTER
I spoke with [facility]- She explained that her Left middle finger is giving her trouble. It is swollen, she is not able to bend it completly to make a fist, and it is sore. She states that she has been having trouble with the left shoulder swelling as well and is seeing Jayna Marquez NP for this. She isn't sure if it is related, but she is having labwork today. She states that she has had no injury to the left hand. She says the right hand that [address] [place] [address] Marquis did the carpal tunnel injection on is doing well. She did see the Rheumatologist and they told her that she has a severe case of rheumatoid arthritis. I told her that her left hand pain may be coming from the RA. She states that the Rheumatologist put her on methotrexate and folic acid. She has only taken that for 1 day. I have scheduled an appointment for Ms. Jorge Mejias to come in and see [address] [place] [address] on 6/28 the first day that [address] [place] [address] Edin Cho is back in the office. She is going to call and speak with the rheumatologist as well. She understands if she needs immediate treatment for this to go to an urgent care/ER.

## 2022-06-22 ENCOUNTER — OFFICE VISIT (OUTPATIENT)
Dept: ORTHOPEDIC SURGERY | Age: 42
End: 2022-06-22
Payer: COMMERCIAL

## 2022-06-22 DIAGNOSIS — M25.511 ACUTE PAIN OF RIGHT SHOULDER: ICD-10-CM

## 2022-06-22 DIAGNOSIS — M25.512 ACUTE PAIN OF LEFT SHOULDER: Primary | ICD-10-CM

## 2022-06-22 PROCEDURE — 99214 OFFICE O/P EST MOD 30 MIN: CPT | Performed by: SPECIALIST/TECHNOLOGIST

## 2022-06-22 PROCEDURE — 20610 DRAIN/INJ JOINT/BURSA W/O US: CPT | Performed by: SPECIALIST/TECHNOLOGIST

## 2022-06-22 RX ORDER — TRIAMCINOLONE ACETONIDE 40 MG/ML
40 INJECTION, SUSPENSION INTRA-ARTICULAR; INTRAMUSCULAR ONCE
Status: COMPLETED | OUTPATIENT
Start: 2022-06-22 | End: 2022-06-22

## 2022-06-22 RX ADMIN — TRIAMCINOLONE ACETONIDE 40 MG: 40 INJECTION, SUSPENSION INTRA-ARTICULAR; INTRAMUSCULAR at 11:13

## 2022-06-22 NOTE — PROGRESS NOTES
Name: Lori Nation  YOB: 1980  Gender: female  MRN: [id number]      CC: Follow-up (bilateral shoulder recheck)       HPI: Lori Nation is a 37 y.o. female who returns for follow up and MRI results on bilateral shoulder pain. She reports continued pain that is not as severe. She reports excellent response to the Medrol Dosepak. Physical Examination:  General: no acute distress  Lungs: breathing easily  CV: regular rhythm by pulse  Left Shoulder: No obvious deformity of the biceps. No tenderness to palpation. Active forward flexion to 170 degrees with minimal pain. External rotation with elbows at side equal bilaterally. External rotation with elbows at side resisted range of motion 5/5 strength. Negative empty can test with 5/5 strength in the empty can position. Negative [place], Neer's. Pain with [place]'s, O'Darius's and speeds. Imaging: I reviewed an MRI performed in our system on 6/17/2022 which shows mild to moderate AC joint arthrosis with hypertrophy improvement and subchondral marrow edema across the joint space. Intact rotator cuff. All imaging independently reviewed by myself KASSIE Ling    Assessment:   1. Acute pain of left shoulder    2. Acute pain of right shoulder         Plan:   Discussed with the patient the MRI findings and reviewed her labs with her which showed an elevated CRP and ESR with no left shift or increased WBC. I think that this may be an acute inflammatory process that is correlated with her RA. I discussed with the patient conservative treatment options to include corticosteroid injection and formal physical therapy. She reports that she be unable to perform physical therapy at this time therefore we proceeded with an intra-articular corticosteroid injection.  After informed verbal consent was obtained the left shoulder glenohumeral joint was injected from an anterior approach with 6 cc of 0.25% Marcaine and 1 cc of 40 mg Kenalog. The patient tolerated the injection well and was given postinjection flare precautions.     HARMONY Montes - CNP    Orthopaedics and Sports Medicine

## 2022-06-27 ENCOUNTER — OFFICE VISIT (OUTPATIENT)
Dept: ORTHOPEDIC SURGERY | Age: 42
End: 2022-06-27
Payer: COMMERCIAL

## 2022-06-27 DIAGNOSIS — M79.645 PAIN OF LEFT MIDDLE FINGER: Primary | ICD-10-CM

## 2022-06-27 PROCEDURE — 99214 OFFICE O/P EST MOD 30 MIN: CPT | Performed by: NURSE PRACTITIONER

## 2022-06-27 RX ORDER — DICLOFENAC SODIUM 30 MG/G
2 GEL TOPICAL 3 TIMES DAILY PRN
Qty: 100 G | Refills: 0 | Status: SHIPPED | OUTPATIENT
Start: 2022-06-27 | End: 2022-07-25

## 2022-06-27 NOTE — LETTER
[address] [address]  Phone: [phone]  Fax: HARMONY Acosta CNP        June 27, 2022     Patient: Lorraine Eisenmenger   YOB: 1980   Date of Visit: 6/27/2022       To Whom it May Concern:    Julia Blackman was seen in my clinic on 6/27/2022. She needs late withdrawal due to medical reasons. If you have any questions or concerns, please don't hesitate to call.     Sincerely,         HARMONY Winkler - AMADA

## 2022-06-27 NOTE — PROGRESS NOTES
Orthopaedic Hand Clinic Note    Name: Martin Moe  YOB: 1980  Gender: female  MRN: [id number]      Follow up visit:   1. Pain of left middle finger        HPI: Martin Moe is a 37 y.o. female who comes in today for left middle finger pain. She says a week or so ago the left middle finger became swollen, painful, and difficult to move. She says that it has gotten better. She saw a rheumatology. She was placed on methotrexate and folic acid. She has been taking this medication for the last 12 days. She is due back to see rheumatology in 4 weeks. She returned from [place]. She reports several joints that are bothering her including bilateral shoulders, left hip, etc.  She is using the Voltaren. ROS/Meds/PSH/PMH/FH/SH: I personally reviewed the patients standard intake form. Pertinents are discussed in the HPI    Physical Examination:    Musculoskeletal Examination:  Examination on the left upper extremity demonstrates cap refill < 5 seconds in all fingers,   Patient has mild swelling to the left middle finger. She is unable to make complete composite fist.  She reports her motion has improved. All tendons are working properly. She is nontender over the A1 pulley. She is nontender over the MCP joint and the DIP joint. She is slightly tender over the PIP joint. She has full extension of her digits. She denies paresthesia. She is neurovascular intact with good capillary refill. Imaging / Electrodiagnostic Tests:     X-rays include a 3 view left hand from her rheumatologist on 6/22/2022 are independently reviewed and interpreted. No joint abnormality noted. Assessment:     ICD-10-CM    1. Pain of left middle finger  M79.645 Diclofenac Sodium 3 % GEL       Plan:   We discussed the diagnosis and different treatment options. We discussed observation, therapy, antiinflammatory medications and other pertinent treatment modalities.     After discussing in detail the patient elects to proceed with continuing with her treatment plan by her rheumatologist.  I told her multiple joint pain was coming from her rheumatoid arthritis. She has only been on treatment for 12 days. She understands. She needs to have a late withdrawal note for school due to medical issues. I am happy to provide her with that note. I will give her a refill of the diclofenac. She is requesting a stronger dose. I will see her back as needed. .     Patient voiced accordance and understanding of the information provided and the formulated plan. All questions were answered to the patient's satisfaction during the encounter.     4 This is a chronic illness with exacerbation, progression, or side effect of treatment  Treatment at this time: Prescription medication    HARMONY Samayoa - CNP  Orthopaedic Surgery  06/27/22  9:19 AM

## 2023-01-11 ENCOUNTER — HOSPITAL ENCOUNTER (EMERGENCY)
Dept: GENERAL RADIOLOGY | Age: 43
Discharge: HOME | End: 2023-01-14

## 2023-01-11 ENCOUNTER — HOSPITAL ENCOUNTER (EMERGENCY)
Age: 43
Discharge: HOME | End: 2023-01-11
Attending: EMERGENCY MEDICINE

## 2023-01-11 VITALS
HEIGHT: 63 IN | RESPIRATION RATE: 16 BRPM | TEMPERATURE: 98.2 F | HEART RATE: 73 BPM | DIASTOLIC BLOOD PRESSURE: 57 MMHG | SYSTOLIC BLOOD PRESSURE: 115 MMHG | BODY MASS INDEX: 46.07 KG/M2 | OXYGEN SATURATION: 97 % | WEIGHT: 260 LBS

## 2023-01-11 DIAGNOSIS — J40 BRONCHITIS: Primary | ICD-10-CM

## 2023-01-11 DIAGNOSIS — M79.10 MYALGIA: ICD-10-CM

## 2023-01-11 DIAGNOSIS — M06.9 RHEUMATOID ARTHRITIS FLARE (HCC): ICD-10-CM

## 2023-01-11 LAB
ALBUMIN SERPL-MCNC: 3.5 G/DL (ref 3.5–5)
ALBUMIN/GLOB SERPL: 0.9 (ref 0.4–1.6)
ALP SERPL-CCNC: 62 U/L (ref 50–136)
ALT SERPL-CCNC: 19 U/L (ref 12–65)
ANION GAP SERPL CALC-SCNC: 3 MMOL/L (ref 2–11)
AST SERPL-CCNC: 12 U/L (ref 15–37)
BASOPHILS # BLD: 0 K/UL (ref 0–0.2)
BASOPHILS NFR BLD: 0 % (ref 0–2)
BILIRUB SERPL-MCNC: 0.3 MG/DL (ref 0.2–1.1)
BUN SERPL-MCNC: 9 MG/DL (ref 6–23)
CALCIUM SERPL-MCNC: 9 MG/DL (ref 8.3–10.4)
CHLORIDE SERPL-SCNC: 106 MMOL/L (ref 101–110)
CO2 SERPL-SCNC: 29 MMOL/L (ref 21–32)
CREAT SERPL-MCNC: 0.56 MG/DL (ref 0.6–1)
CRP SERPL-MCNC: 1.7 MG/DL (ref 0–0.9)
DIFFERENTIAL METHOD BLD: ABNORMAL
EOSINOPHIL # BLD: 0.2 K/UL (ref 0–0.8)
EOSINOPHIL NFR BLD: 4 % (ref 0.5–7.8)
ERYTHROCYTE [DISTWIDTH] IN BLOOD BY AUTOMATED COUNT: 16.1 % (ref 11.9–14.6)
GLOBULIN SER CALC-MCNC: 3.8 G/DL (ref 2.8–4.5)
GLUCOSE SERPL-MCNC: 102 MG/DL (ref 65–100)
HCT VFR BLD AUTO: 37.2 % (ref 35.8–46.3)
HGB BLD-MCNC: 11.5 G/DL (ref 11.7–15.4)
IMM GRANULOCYTES # BLD AUTO: 0 K/UL (ref 0–0.5)
IMM GRANULOCYTES NFR BLD AUTO: 0 % (ref 0–5)
LIPASE SERPL-CCNC: 87 U/L (ref 73–393)
LYMPHOCYTES # BLD: 1.5 K/UL (ref 0.5–4.6)
LYMPHOCYTES NFR BLD: 26 % (ref 13–44)
MAGNESIUM SERPL-MCNC: 1.9 MG/DL (ref 1.8–2.4)
MCH RBC QN AUTO: 24.8 PG (ref 26.1–32.9)
MCHC RBC AUTO-ENTMCNC: 30.9 G/DL (ref 31.4–35)
MCV RBC AUTO: 80.3 FL (ref 82–102)
MONOCYTES # BLD: 0.5 K/UL (ref 0.1–1.3)
MONOCYTES NFR BLD: 9 % (ref 4–12)
NEUTS SEG # BLD: 3.4 K/UL (ref 1.7–8.2)
NEUTS SEG NFR BLD: 61 % (ref 43–78)
NRBC # BLD: 0 K/UL (ref 0–0.2)
PLATELET # BLD AUTO: 183 K/UL (ref 150–450)
PMV BLD AUTO: 13.2 FL (ref 9.4–12.3)
POTASSIUM SERPL-SCNC: 4.1 MMOL/L (ref 3.5–5.1)
PROT SERPL-MCNC: 7.3 G/DL (ref 6.3–8.2)
RBC # BLD AUTO: 4.63 M/UL (ref 4.05–5.2)
SODIUM SERPL-SCNC: 138 MMOL/L (ref 133–143)
WBC # BLD AUTO: 5.6 K/UL (ref 4.3–11.1)

## 2023-01-11 PROCEDURE — 85025 COMPLETE CBC W/AUTO DIFF WBC: CPT

## 2023-01-11 PROCEDURE — 96375 TX/PRO/DX INJ NEW DRUG ADDON: CPT

## 2023-01-11 PROCEDURE — 80053 COMPREHEN METABOLIC PANEL: CPT

## 2023-01-11 PROCEDURE — 6360000002 HC RX W HCPCS: Performed by: EMERGENCY MEDICINE

## 2023-01-11 PROCEDURE — 99284 EMERGENCY DEPT VISIT MOD MDM: CPT

## 2023-01-11 PROCEDURE — 86140 C-REACTIVE PROTEIN: CPT

## 2023-01-11 PROCEDURE — 71045 X-RAY EXAM CHEST 1 VIEW: CPT

## 2023-01-11 PROCEDURE — 96374 THER/PROPH/DIAG INJ IV PUSH: CPT

## 2023-01-11 PROCEDURE — 83690 ASSAY OF LIPASE: CPT

## 2023-01-11 PROCEDURE — 83735 ASSAY OF MAGNESIUM: CPT

## 2023-01-11 PROCEDURE — 2580000003 HC RX 258: Performed by: EMERGENCY MEDICINE

## 2023-01-11 RX ORDER — METHOCARBAMOL 750 MG/1
750 TABLET, FILM COATED ORAL 4 TIMES DAILY
Qty: 40 TABLET | Refills: 0 | Status: SHIPPED | OUTPATIENT
Start: 2023-01-11 | End: 2023-01-21

## 2023-01-11 RX ORDER — PREDNISONE 20 MG/1
60 TABLET ORAL DAILY
Qty: 15 TABLET | Refills: 0 | Status: SHIPPED | OUTPATIENT
Start: 2023-01-11 | End: 2023-01-16

## 2023-01-11 RX ORDER — KETOROLAC TROMETHAMINE 30 MG/ML
30 INJECTION, SOLUTION INTRAMUSCULAR; INTRAVENOUS ONCE
Status: COMPLETED | OUTPATIENT
Start: 2023-01-11 | End: 2023-01-11

## 2023-01-11 RX ORDER — NAPROXEN 500 MG/1
500 TABLET ORAL 2 TIMES DAILY
Qty: 60 TABLET | Refills: 0 | Status: SHIPPED | OUTPATIENT
Start: 2023-01-11

## 2023-01-11 RX ORDER — 0.9 % SODIUM CHLORIDE 0.9 %
1000 INTRAVENOUS SOLUTION INTRAVENOUS ONCE
Status: COMPLETED | OUTPATIENT
Start: 2023-01-11 | End: 2023-01-11

## 2023-01-11 RX ORDER — DEXAMETHASONE SODIUM PHOSPHATE 10 MG/ML
10 INJECTION INTRAMUSCULAR; INTRAVENOUS ONCE
Status: COMPLETED | OUTPATIENT
Start: 2023-01-11 | End: 2023-01-11

## 2023-01-11 RX ADMIN — SODIUM CHLORIDE 1000 ML: 9 INJECTION, SOLUTION INTRAVENOUS at 08:03

## 2023-01-11 RX ADMIN — KETOROLAC TROMETHAMINE 30 MG: 30 INJECTION, SOLUTION INTRAMUSCULAR at 08:04

## 2023-01-11 RX ADMIN — DEXAMETHASONE SODIUM PHOSPHATE 10 MG: 10 INJECTION, SOLUTION INTRAMUSCULAR; INTRAVENOUS at 08:04

## 2023-01-11 ASSESSMENT — ENCOUNTER SYMPTOMS
GASTROINTESTINAL NEGATIVE: 1
SHORTNESS OF BREATH: 1
COUGH: 1

## 2023-01-11 ASSESSMENT — PAIN - FUNCTIONAL ASSESSMENT
PAIN_FUNCTIONAL_ASSESSMENT: NONE - DENIES PAIN
PAIN_FUNCTIONAL_ASSESSMENT: 0-10

## 2023-01-11 ASSESSMENT — PAIN DESCRIPTION - LOCATION: LOCATION: HAND

## 2023-01-11 ASSESSMENT — PAIN SCALES - GENERAL: PAINLEVEL_OUTOF10: 9

## 2023-01-11 NOTE — ED PROVIDER NOTES
Emergency Department Provider Note                   PCP:                SAI Bowens               Age: 40 y.o. Sex: female       ICD-10-CM    1. Bronchitis  J40       2. Myalgia  M79.10       3. Rheumatoid arthritis flare (HCC)  M06.9           DISPOSITION Decision To Discharge 01/11/2023 09:35:32 AM        Medical Decision Making  71-year-old -American female presented emergency department with generalized body aches. Patient has rheumatoid arthritis patient has been out of her medication secondary to not having insurance. Patient now has insurance that began a year and did request referral to family doctor. Patient given that information. Patient will be given short course of steroids and anti-inflammatory medications. She will also be given some muscle relaxers. Patient's labs and imaging are reassuring. Patient will be discharged home as she is feeling some better already. She will return to the emergency department for any concerns. Amount and/or Complexity of Data Reviewed  Labs: ordered. Radiology: ordered and independent interpretation performed. Decision-making details documented in ED Course. Risk  Prescription drug management. ED Course as of 01/11/23 0941 Wed Jan 11, 2023   6441 Patient is feeling better after medications. Patient will be discharged home. She will return to the emergency department for any concerns.  [JL]      ED Course User Index  [JL] Dave Steele MD        Orders Placed This Encounter   Procedures    XR CHEST PORTABLE    CBC with Auto Differential    CMP    Magnesium    Lipase    C-Reactive Protein    Saline lock IV        Medications   0.9 % sodium chloride bolus (1,000 mLs IntraVENous New Bag 1/11/23 0803)   ketorolac (TORADOL) injection 30 mg (30 mg IntraVENous Given 1/11/23 0804)   dexamethasone (DECADRON) injection 10 mg (10 mg IntraVENous Given 1/11/23 0804)       New Prescriptions    METHOCARBAMOL (ROBAXIN-750) 750 MG TABLET    Take 1 tablet by mouth 4 times daily for 10 days    NAPROXEN (NAPROSYN) 500 MG TABLET    Take 1 tablet by mouth 2 times daily    PREDNISONE (DELTASONE) 20 MG TABLET    Take 3 tablets by mouth daily for 5 days        Елена Grimes is a 40 y.o. female who presents to the Emergency Department with chief complaint of    Chief Complaint   Patient presents with    Shortness of Breath      43-year-old -American female presents emergency department with complaints of generalized body aches, cough, intermittent shortness of breath that has been ongoing for the past few months. Patient states that she has been off of her RA medicines and her joints and body has been hurting. She is rating her left upper back and left shoulder. She does not think that she is more short of breath when she ambulates and she does not have any chest pain. Patient does feel like she has sleep apnea and has been snoring more. She been told that she is to follow-up with pulmonologist as well. Patient denies any fevers, chills, chest pain, abdominal pain, changes in bowel or bladder habits or any other concerns. The history is provided by the patient and medical records. Review of Systems   Constitutional:  Positive for fatigue. HENT: Negative. Respiratory:  Positive for cough and shortness of breath. Cardiovascular: Negative. Gastrointestinal: Negative. Genitourinary: Negative. Musculoskeletal:  Positive for arthralgias and myalgias. Skin: Negative. Neurological: Negative. Psychiatric/Behavioral: Negative. All other systems reviewed and are negative.     Past Medical History:   Diagnosis Date    Abnormal Papanicolaou smear of cervix     repeat normal    Anemia     Anxiety     Arthritis     Depression     managed with meds    Herpes genitalia     History of chicken pox         Past Surgical History:   Procedure Laterality Date     SECTION      DILATION AND CURETTAGE OF UTERUS  2011    Miscarriage    GYN  2018    tubes removed        Family History   Problem Relation Age of Onset    Osteoporosis Maternal Grandmother     Hypertension Paternal Grandmother     Breast Cancer Maternal Grandmother         lung. Pt actually not sure which was the primary. She  at 58    Diabetes Maternal Grandmother     Hypertension Paternal Aunt     Osteoarthritis Maternal Aunt     Hypertension Father     Liver Disease Mother         hep c cirrhos    Depression Mother         Social History     Socioeconomic History    Marital status:    Tobacco Use    Smoking status: Never    Smokeless tobacco: Never   Substance and Sexual Activity    Alcohol use: Not Currently    Drug use: Never         Patient has no known allergies. Previous Medications    CICLOPIROX 1 % SHAM    USE TO SHAMPOO DAILY    DICLOFENAC (VOLTAREN) 75 MG EC TABLET    Take 1 tablet by mouth 2 times daily    DICLOFENAC SODIUM (VOLTAREN) 1 % GEL    Apply 2 g topically 3 times daily    LAMOTRIGINE (LAMICTAL) 100 MG TABLET    Take 100 mg by mouth daily    LIDOCAINE, ANORECTAL, 5 % CREA    Apply topically 2 times daily as needed    LISDEXAMFETAMINE (VYVANSE) 70 MG CAPSULE    Take 70 mg by mouth daily. LORAZEPAM (ATIVAN) 1 MG TABLET    Take 1 mg by mouth every 6 hours as needed. METHYLPREDNISOLONE (MEDROL DOSEPACK) 4 MG TABLET    Take by mouth as directed. ONDANSETRON (ZOFRAN) 4 MG TABLET    Take 4 mg by mouth every 8 hours as needed    SERTRALINE (ZOLOFT) 100 MG TABLET    Take 1/2 tablet for four daysThen increase to ONE tablet for TEN daysThen increase to TWO tablets daily    TRAZODONE (DESYREL) 300 MG TABLET    Take 300 mg by mouth        Vitals signs and nursing note reviewed. Patient Vitals for the past 4 hrs:   BP SpO2   23 0745 129/79 93 %   23 0712 (!) 144/88 97 %          Physical Exam  Vitals and nursing note reviewed. Constitutional:       General: She is not in acute distress. Appearance: She is well-developed. She is ill-appearing (Appears to not feel well). She is not toxic-appearing. HENT:      Head: Normocephalic and atraumatic. Mouth/Throat:      Mouth: Mucous membranes are moist.   Eyes:      Extraocular Movements: Extraocular movements intact. Pupils: Pupils are equal, round, and reactive to light. Cardiovascular:      Rate and Rhythm: Normal rate and regular rhythm. Pulmonary:      Effort: Pulmonary effort is normal.      Breath sounds: No decreased breath sounds, wheezing, rhonchi or rales. Chest:      Chest wall: No tenderness. Abdominal:      Palpations: Abdomen is soft. Tenderness: There is no abdominal tenderness. There is no guarding or rebound. Musculoskeletal:         General: Normal range of motion. Cervical back: Normal range of motion and neck supple. Comments: Generalized body soreness and tenderness palpation. No focal tenderness to palpation. Mild spasm to left upper paraspinal thoracic muscles. Skin:     General: Skin is warm and dry. Neurological:      General: No focal deficit present. Mental Status: She is alert and oriented to person, place, and time. Psychiatric:         Mood and Affect: Mood normal.         Behavior: Behavior normal.          Results for orders placed or performed during the hospital encounter of 01/11/23   XR CHEST PORTABLE    Narrative    Portable chest xray      COMPARISON: none. INDICATION: Shortness of breath    FINDINGS:     There is no focal pulmonary consolidation, pleural effusion or pneumothorax. No  pulmonary edema. Cardiomediastinal silhouette is within normal limits. Surrounding bones are intact. Impression    1. No radiographic evidence of pneumonia or pulmonary edema.    CBC with Auto Differential   Result Value Ref Range    WBC 5.6 4.3 - 11.1 K/uL    RBC 4.63 4.05 - 5.2 M/uL    Hemoglobin 11.5 (L) 11.7 - 15.4 g/dL    Hematocrit 37.2 35.8 - 46.3 %    MCV 80.3 (L) 82.0 - 102.0 FL    MCH 24.8 (L) 26.1 - 32.9 PG    MCHC 30.9 (L) 31.4 - 35.0 g/dL    RDW 16.1 (H) 11.9 - 14.6 %    Platelets 252 775 - 381 K/uL    MPV 13.2 (H) 9.4 - 12.3 FL    nRBC 0.00 0.0 - 0.2 K/uL    Differential Type AUTOMATED      Seg Neutrophils 61 43 - 78 %    Lymphocytes 26 13 - 44 %    Monocytes 9 4.0 - 12.0 %    Eosinophils % 4 0.5 - 7.8 %    Basophils 0 0.0 - 2.0 %    Immature Granulocytes 0 0.0 - 5.0 %    Segs Absolute 3.4 1.7 - 8.2 K/UL    Absolute Lymph # 1.5 0.5 - 4.6 K/UL    Absolute Mono # 0.5 0.1 - 1.3 K/UL    Absolute Eos # 0.2 0.0 - 0.8 K/UL    Basophils Absolute 0.0 0.0 - 0.2 K/UL    Absolute Immature Granulocyte 0.0 0.0 - 0.5 K/UL   CMP   Result Value Ref Range    Sodium 138 133 - 143 mmol/L    Potassium 4.1 3.5 - 5.1 mmol/L    Chloride 106 101 - 110 mmol/L    CO2 29 21 - 32 mmol/L    Anion Gap 3 2 - 11 mmol/L    Glucose 102 (H) 65 - 100 mg/dL    BUN 9 6 - 23 MG/DL    Creatinine 0.56 (L) 0.6 - 1.0 MG/DL    Est, Glom Filt Rate >60 >60 ml/min/1.73m2    Calcium 9.0 8.3 - 10.4 MG/DL    Total Bilirubin 0.3 0.2 - 1.1 MG/DL    ALT 19 12 - 65 U/L    AST 12 (L) 15 - 37 U/L    Alk Phosphatase 62 50 - 136 U/L    Total Protein 7.3 6.3 - 8.2 g/dL    Albumin 3.5 3.5 - 5.0 g/dL    Globulin 3.8 2.8 - 4.5 g/dL    Albumin/Globulin Ratio 0.9 0.4 - 1.6     Magnesium   Result Value Ref Range    Magnesium 1.9 1.8 - 2.4 mg/dL   Lipase   Result Value Ref Range    Lipase 87 73 - 393 U/L   C-Reactive Protein   Result Value Ref Range    CRP 1.7 (H) 0.0 - 0.9 mg/dL        XR CHEST PORTABLE   Final Result      1. No radiographic evidence of pneumonia or pulmonary edema. Voice dictation software was used during the making of this note. This software is not perfect and grammatical and other typographical errors may be present. This note has not been completely proofread for errors.         Ese Rendon MD  01/11/23 8970

## 2023-01-11 NOTE — ED TRIAGE NOTES
Has RA lost her job and can't afford her RA meds , having shortness of breath when she was sleeping, states she has increased snoring at night unsure if she has sleep apnea    States joint pain and swelling   No  fever or chills ,

## 2023-01-11 NOTE — ED NOTES
I have reviewed discharge instructions with the patient. The patient verbalized understanding. Patient left ED via Discharge Method: ambulatory to Home with ( self). Opportunity for questions and clarification provided. Patient given 3 scripts. No esign         To continue your aftercare when you leave the hospital, you may receive an automated call from our care team to check in on how you are doing. This is a free service and part of our promise to provide the best care and service to meet your aftercare needs.  If you have questions, or wish to unsubscribe from this service please call [phone]. Thank you for Choosing our [facility] Emergency Department.       Rajesh Feliciano RN  01/11/23 2163

## 2023-01-11 NOTE — DISCHARGE INSTRUCTIONS
Take medicines as prescribed. Return to the emergency department for any concerns. We would love to help you get a primary care doctor for follow-up after your emergency department visit. Please call [phone] between 7AM - 6PM Monday to Friday. A care navigator will be able to assist you with setting up a doctor close to your home.

## 2023-07-18 ENCOUNTER — OFFICE VISIT (OUTPATIENT)
Dept: PRIMARY CARE CLINIC | Facility: CLINIC | Age: 43
End: 2023-07-18
Payer: COMMERCIAL

## 2023-07-18 VITALS
OXYGEN SATURATION: 99 % | HEIGHT: 64 IN | TEMPERATURE: 98.1 F | BODY MASS INDEX: 45.14 KG/M2 | RESPIRATION RATE: 18 BRPM | WEIGHT: 264.4 LBS | DIASTOLIC BLOOD PRESSURE: 82 MMHG | SYSTOLIC BLOOD PRESSURE: 131 MMHG | HEART RATE: 78 BPM

## 2023-07-18 DIAGNOSIS — L73.2 HIDRADENITIS SUPPURATIVA OF RIGHT AXILLA: ICD-10-CM

## 2023-07-18 DIAGNOSIS — R73.9 ELEVATED BLOOD SUGAR: ICD-10-CM

## 2023-07-18 DIAGNOSIS — E66.01 OBESITY, MORBID (HCC): ICD-10-CM

## 2023-07-18 DIAGNOSIS — F41.9 ANXIETY AND DEPRESSION: ICD-10-CM

## 2023-07-18 DIAGNOSIS — F51.04 PSYCHOPHYSIOLOGICAL INSOMNIA: ICD-10-CM

## 2023-07-18 DIAGNOSIS — F32.A ANXIETY AND DEPRESSION: ICD-10-CM

## 2023-07-18 DIAGNOSIS — G89.29 OTHER CHRONIC PAIN: ICD-10-CM

## 2023-07-18 DIAGNOSIS — M05.9 SEROPOSITIVE RHEUMATOID ARTHRITIS (HCC): Primary | ICD-10-CM

## 2023-07-18 DIAGNOSIS — Z01.419 PERIODIC HEALTH ASSESSMENT, PAP AND PELVIC: ICD-10-CM

## 2023-07-18 DIAGNOSIS — Z79.899 MEDICATION MANAGEMENT: ICD-10-CM

## 2023-07-18 DIAGNOSIS — G56.03 BILATERAL CARPAL TUNNEL SYNDROME: ICD-10-CM

## 2023-07-18 PROCEDURE — 99204 OFFICE O/P NEW MOD 45 MIN: CPT | Performed by: FAMILY MEDICINE

## 2023-07-18 RX ORDER — MULTIVIT-MIN/IRON FUM/FOLIC AC 7.5 MG-4
1 TABLET ORAL DAILY
Qty: 100 TABLET | Refills: 3 | COMMUNITY
Start: 2023-07-18

## 2023-07-18 RX ORDER — FOLIC ACID 1 MG/1
1 TABLET ORAL DAILY
Qty: 90 TABLET | Refills: 1 | Status: SHIPPED | OUTPATIENT
Start: 2023-07-18

## 2023-07-18 RX ORDER — MELOXICAM 7.5 MG/1
TABLET ORAL
COMMUNITY
Start: 2023-06-28 | End: 2023-07-18 | Stop reason: ALTCHOICE

## 2023-07-18 RX ORDER — PANTOPRAZOLE SODIUM 40 MG/1
40 TABLET, DELAYED RELEASE ORAL
Qty: 30 TABLET | Refills: 5 | Status: SHIPPED | OUTPATIENT
Start: 2023-07-18

## 2023-07-18 RX ORDER — HYDROXYCHLOROQUINE SULFATE 200 MG/1
200 TABLET, FILM COATED ORAL 2 TIMES DAILY
Qty: 180 TABLET | Refills: 2 | Status: SHIPPED | OUTPATIENT
Start: 2023-07-18

## 2023-07-18 RX ORDER — DULOXETIN HYDROCHLORIDE 30 MG/1
30 CAPSULE, DELAYED RELEASE ORAL DAILY
Qty: 30 CAPSULE | Refills: 3 | Status: SHIPPED | OUTPATIENT
Start: 2023-07-18

## 2023-07-18 RX ORDER — DICLOFENAC SODIUM 75 MG/1
TABLET, DELAYED RELEASE ORAL
Qty: 60 TABLET | Refills: 1 | Status: SHIPPED | OUTPATIENT
Start: 2023-07-18

## 2023-07-18 SDOH — ECONOMIC STABILITY: FOOD INSECURITY: WITHIN THE PAST 12 MONTHS, YOU WORRIED THAT YOUR FOOD WOULD RUN OUT BEFORE YOU GOT MONEY TO BUY MORE.: NEVER TRUE

## 2023-07-18 SDOH — ECONOMIC STABILITY: INCOME INSECURITY: HOW HARD IS IT FOR YOU TO PAY FOR THE VERY BASICS LIKE FOOD, HOUSING, MEDICAL CARE, AND HEATING?: NOT VERY HARD

## 2023-07-18 SDOH — ECONOMIC STABILITY: FOOD INSECURITY: WITHIN THE PAST 12 MONTHS, THE FOOD YOU BOUGHT JUST DIDN'T LAST AND YOU DIDN'T HAVE MONEY TO GET MORE.: NEVER TRUE

## 2023-07-18 SDOH — HEALTH STABILITY: PHYSICAL HEALTH: ON AVERAGE, HOW MANY MINUTES DO YOU ENGAGE IN EXERCISE AT THIS LEVEL?: 30 MIN

## 2023-07-18 SDOH — HEALTH STABILITY: PHYSICAL HEALTH: ON AVERAGE, HOW MANY DAYS PER WEEK DO YOU ENGAGE IN MODERATE TO STRENUOUS EXERCISE (LIKE A BRISK WALK)?: 3 DAYS

## 2023-07-18 SDOH — ECONOMIC STABILITY: HOUSING INSECURITY
IN THE LAST 12 MONTHS, WAS THERE A TIME WHEN YOU DID NOT HAVE A STEADY PLACE TO SLEEP OR SLEPT IN A SHELTER (INCLUDING NOW)?: NO

## 2023-07-18 ASSESSMENT — PATIENT HEALTH QUESTIONNAIRE - PHQ9
10. IF YOU CHECKED OFF ANY PROBLEMS, HOW DIFFICULT HAVE THESE PROBLEMS MADE IT FOR YOU TO DO YOUR WORK, TAKE CARE OF THINGS AT HOME, OR GET ALONG WITH OTHER PEOPLE: 2
4. FEELING TIRED OR HAVING LITTLE ENERGY: 3
DEPRESSION UNABLE TO ASSESS: FUNCTIONAL CAPACITY MOTIVATION LIMITS ACCURACY
SUM OF ALL RESPONSES TO PHQ9 QUESTIONS 1 & 2: 6
SUM OF ALL RESPONSES TO PHQ QUESTIONS 1-9: 18
7. TROUBLE CONCENTRATING ON THINGS, SUCH AS READING THE NEWSPAPER OR WATCHING TELEVISION: 0
6. FEELING BAD ABOUT YOURSELF - OR THAT YOU ARE A FAILURE OR HAVE LET YOURSELF OR YOUR FAMILY DOWN: 3
2. FEELING DOWN, DEPRESSED OR HOPELESS: 3
SUM OF ALL RESPONSES TO PHQ QUESTIONS 1-9: 18
1. LITTLE INTEREST OR PLEASURE IN DOING THINGS: 3
5. POOR APPETITE OR OVEREATING: 3
9. THOUGHTS THAT YOU WOULD BE BETTER OFF DEAD, OR OF HURTING YOURSELF: 0
3. TROUBLE FALLING OR STAYING ASLEEP: 3
8. MOVING OR SPEAKING SO SLOWLY THAT OTHER PEOPLE COULD HAVE NOTICED. OR THE OPPOSITE, BEING SO FIGETY OR RESTLESS THAT YOU HAVE BEEN MOVING AROUND A LOT MORE THAN USUAL: 0

## 2023-07-18 ASSESSMENT — ENCOUNTER SYMPTOMS
SINUS PRESSURE: 0
NAUSEA: 0
CONSTIPATION: 0
SINUS PAIN: 0
VOICE CHANGE: 0
CHOKING: 0
EYE DISCHARGE: 0
ABDOMINAL DISTENTION: 0
SORE THROAT: 0
PHOTOPHOBIA: 0
DIARRHEA: 0
TROUBLE SWALLOWING: 0
COLOR CHANGE: 0
CHEST TIGHTNESS: 0
VOMITING: 0
WHEEZING: 0
BLOOD IN STOOL: 0
COUGH: 0
BACK PAIN: 0
ABDOMINAL PAIN: 0
EYE REDNESS: 0
RHINORRHEA: 0
SHORTNESS OF BREATH: 0
EYE PAIN: 0

## 2023-07-18 ASSESSMENT — ANXIETY QUESTIONNAIRES
6. BECOMING EASILY ANNOYED OR IRRITABLE: 1
5. BEING SO RESTLESS THAT IT IS HARD TO SIT STILL: 1
7. FEELING AFRAID AS IF SOMETHING AWFUL MIGHT HAPPEN: 0
GAD7 TOTAL SCORE: 12
3. WORRYING TOO MUCH ABOUT DIFFERENT THINGS: 3
4. TROUBLE RELAXING: 3
2. NOT BEING ABLE TO STOP OR CONTROL WORRYING: 3
1. FEELING NERVOUS, ANXIOUS, OR ON EDGE: 1
IF YOU CHECKED OFF ANY PROBLEMS ON THIS QUESTIONNAIRE, HOW DIFFICULT HAVE THESE PROBLEMS MADE IT FOR YOU TO DO YOUR WORK, TAKE CARE OF THINGS AT HOME, OR GET ALONG WITH OTHER PEOPLE: SOMEWHAT DIFFICULT

## 2023-07-18 NOTE — PROGRESS NOTES
food  Dispense: 60 tablet; Refill: 1  - hydroxychloroquine (PLAQUENIL) 200 MG tablet; Take 1 tablet by mouth 2 times daily  Dispense: 180 tablet; Refill: 2 - [address] [address] - Pierre Richmond MD, Rheumatology, [place]  - CBC with Auto Differential; Future  - Comprehensive Metabolic Panel; Future    2. Anxiety and depression  May benefit from follow-up with behavioral health meantime start Cymbalta for chronic pain anxiety depression  - DULoxetine (CYMBALTA) 30 MG extended release capsule; Take 1 capsule by mouth daily  Dispense: 30 capsule; Refill: 3  - [facility] (Psychiatry)    3. Bilateral carpal tunnel syndrome    As any problem at present may need follow-up evaluation with hand surgery    4. Hidradenitis suppurativa of right axilla  Consider starting doxycycline this is better no acute exacerbation    5. Obesity, morbid ([address])  Exercise weight management nutritional counseling May benefit from bariatric surgery    6. Psychophysiological insomnia  She is on the highest dose of trazodone behavioral health follow-up sleep hygiene    7. Medication management    - Comfort Higgins MD, Rheumatology, [address] [facility] (Psychiatry)  - CBC with Auto Differential; Future  - Comprehensive Metabolic Panel; Future  - TSH; Future    8. Elevated blood sugar    - Hemoglobin A1C; Future    9. Other chronic pain    - DULoxetine (CYMBALTA) 30 MG extended release capsule; Take 1 capsule by mouth daily  Dispense: 30 capsule; Refill: 3    10. Periodic health assessment, Pap and pelvic  History of tubal ligation anemia OB/GYN follow-up Pap smear  - [address] [facility]  Preventative care discussed. No family history of premature ovarian breast colon cancer or cardiovascular disease. Previous lab test reviewed.   History elevated blood sugar high risk for diabetes    Jin Nunn MD

## 2023-07-25 DIAGNOSIS — Z79.899 MEDICATION MANAGEMENT: ICD-10-CM

## 2023-07-25 DIAGNOSIS — M05.9 SEROPOSITIVE RHEUMATOID ARTHRITIS (HCC): ICD-10-CM

## 2023-07-25 DIAGNOSIS — R73.9 ELEVATED BLOOD SUGAR: ICD-10-CM

## 2023-07-25 LAB
ALBUMIN SERPL-MCNC: 3.5 G/DL (ref 3.5–5)
ALBUMIN/GLOB SERPL: 1 (ref 0.4–1.6)
ALP SERPL-CCNC: 56 U/L (ref 50–136)
ALT SERPL-CCNC: 22 U/L (ref 12–65)
ANION GAP SERPL CALC-SCNC: 4 MMOL/L (ref 2–11)
AST SERPL-CCNC: 15 U/L (ref 15–37)
BASOPHILS # BLD: 0 K/UL (ref 0–0.2)
BASOPHILS NFR BLD: 1 % (ref 0–2)
BILIRUB SERPL-MCNC: 0.3 MG/DL (ref 0.2–1.1)
BUN SERPL-MCNC: 12 MG/DL (ref 6–23)
CALCIUM SERPL-MCNC: 8.7 MG/DL (ref 8.3–10.4)
CHLORIDE SERPL-SCNC: 109 MMOL/L (ref 101–110)
CO2 SERPL-SCNC: 26 MMOL/L (ref 21–32)
CREAT SERPL-MCNC: 0.8 MG/DL (ref 0.6–1)
DIFFERENTIAL METHOD BLD: ABNORMAL
EOSINOPHIL # BLD: 0.1 K/UL (ref 0–0.8)
EOSINOPHIL NFR BLD: 2 % (ref 0.5–7.8)
ERYTHROCYTE [DISTWIDTH] IN BLOOD BY AUTOMATED COUNT: 15.9 % (ref 11.9–14.6)
GLOBULIN SER CALC-MCNC: 3.6 G/DL (ref 2.8–4.5)
GLUCOSE SERPL-MCNC: 83 MG/DL (ref 65–100)
HCT VFR BLD AUTO: 37.2 % (ref 35.8–46.3)
HGB BLD-MCNC: 11.3 G/DL (ref 11.7–15.4)
IMM GRANULOCYTES # BLD AUTO: 0 K/UL (ref 0–0.5)
IMM GRANULOCYTES NFR BLD AUTO: 0 % (ref 0–5)
LYMPHOCYTES # BLD: 2.3 K/UL (ref 0.5–4.6)
LYMPHOCYTES NFR BLD: 36 % (ref 13–44)
MCH RBC QN AUTO: 25.3 PG (ref 26.1–32.9)
MCHC RBC AUTO-ENTMCNC: 30.4 G/DL (ref 31.4–35)
MCV RBC AUTO: 83.4 FL (ref 82–102)
MONOCYTES # BLD: 0.5 K/UL (ref 0.1–1.3)
MONOCYTES NFR BLD: 7 % (ref 4–12)
NEUTS SEG # BLD: 3.4 K/UL (ref 1.7–8.2)
NEUTS SEG NFR BLD: 54 % (ref 43–78)
NRBC # BLD: 0 K/UL (ref 0–0.2)
PLATELET # BLD AUTO: 192 K/UL (ref 150–450)
PMV BLD AUTO: 13.7 FL (ref 9.4–12.3)
POTASSIUM SERPL-SCNC: 4.3 MMOL/L (ref 3.5–5.1)
PROT SERPL-MCNC: 7.1 G/DL (ref 6.3–8.2)
RBC # BLD AUTO: 4.46 M/UL (ref 4.05–5.2)
SODIUM SERPL-SCNC: 139 MMOL/L (ref 133–143)
TSH, 3RD GENERATION: 3.91 UIU/ML (ref 0.36–3.74)
WBC # BLD AUTO: 6.3 K/UL (ref 4.3–11.1)

## 2023-07-26 LAB
EST. AVERAGE GLUCOSE BLD GHB EST-MCNC: 111 MG/DL
HBA1C MFR BLD: 5.5 % (ref 4.8–5.6)

## 2023-09-05 ENCOUNTER — OFFICE VISIT (OUTPATIENT)
Dept: OBGYN CLINIC | Age: 43
End: 2023-09-05
Payer: COMMERCIAL

## 2023-09-05 VITALS
DIASTOLIC BLOOD PRESSURE: 80 MMHG | HEIGHT: 64 IN | BODY MASS INDEX: 44.22 KG/M2 | SYSTOLIC BLOOD PRESSURE: 128 MMHG | WEIGHT: 259 LBS

## 2023-09-05 DIAGNOSIS — Z12.31 SCREENING MAMMOGRAM, ENCOUNTER FOR: ICD-10-CM

## 2023-09-05 DIAGNOSIS — R68.82 DECREASED LIBIDO: ICD-10-CM

## 2023-09-05 DIAGNOSIS — N94.6 DYSMENORRHEA: ICD-10-CM

## 2023-09-05 DIAGNOSIS — B00.9 HSV-2 INFECTION: ICD-10-CM

## 2023-09-05 DIAGNOSIS — Z01.419 WOMEN'S ANNUAL ROUTINE GYNECOLOGICAL EXAMINATION: ICD-10-CM

## 2023-09-05 DIAGNOSIS — N92.0 MENORRHAGIA WITH REGULAR CYCLE: ICD-10-CM

## 2023-09-05 PROBLEM — M25.532 LEFT WRIST PAIN: Status: RESOLVED | Noted: 2021-09-02 | Resolved: 2023-09-05

## 2023-09-05 PROBLEM — G56.01 RIGHT CARPAL TUNNEL SYNDROME: Status: RESOLVED | Noted: 2022-06-01 | Resolved: 2023-09-05

## 2023-09-05 PROBLEM — S63.502A SPRAIN OF LEFT WRIST: Status: RESOLVED | Noted: 2021-09-02 | Resolved: 2023-09-05

## 2023-09-05 PROBLEM — M79.645 PAIN OF LEFT MIDDLE FINGER: Status: RESOLVED | Noted: 2022-06-27 | Resolved: 2023-09-05

## 2023-09-05 PROCEDURE — 99204 OFFICE O/P NEW MOD 45 MIN: CPT | Performed by: OBSTETRICS & GYNECOLOGY

## 2023-09-05 PROCEDURE — 99386 PREV VISIT NEW AGE 40-64: CPT | Performed by: OBSTETRICS & GYNECOLOGY

## 2023-09-05 RX ORDER — NORETHINDRONE ACETATE AND ETHINYL ESTRADIOL AND FERROUS FUMARATE 1MG-20(24)
1 KIT ORAL DAILY
Qty: 28 TABLET | Refills: 12 | Status: SHIPPED | OUTPATIENT
Start: 2023-09-05

## 2023-09-05 RX ORDER — AMOXICILLIN AND CLAVULANATE POTASSIUM 875; 125 MG/1; MG/1
1 TABLET, FILM COATED ORAL 2 TIMES DAILY
COMMUNITY
Start: 2023-08-23

## 2023-09-05 RX ORDER — METHYLPREDNISOLONE 4 MG/1
TABLET ORAL
COMMUNITY
Start: 2023-08-23

## 2023-09-05 RX ORDER — VALACYCLOVIR HYDROCHLORIDE 500 MG/1
500 TABLET, FILM COATED ORAL DAILY
Qty: 30 TABLET | Refills: 11 | Status: SHIPPED | OUTPATIENT
Start: 2023-09-05

## 2023-09-05 ASSESSMENT — PATIENT HEALTH QUESTIONNAIRE - PHQ9: DEPRESSION UNABLE TO ASSESS: FUNCTIONAL CAPACITY MOTIVATION LIMITS ACCURACY

## 2023-09-05 NOTE — PROGRESS NOTES
[facility]  [place], [address], [address] [place] [address]    Emmanuel Leigh MD, Cordelia Banks ROEL-BC  Kasi Mesa MD, FACOG      Assessment/Plan     Patient Active Problem List    Diagnosis Date Noted    Women's annual routine gynecological examination 09/05/2023     Overview Note:     Pap + HPV done 9/5/23         Assessment & Plan Note:     Exam as below  Encouraged annual exams with paps as indicated  Pt to F/U with PCP for all non-gyn health related issues       Screening mammogram, encounter for 09/05/2023     Overview Note:     Ordered 9/5/23         Assessment & Plan Note:     D/W pt that in low risk patients that recommendation is for mammograms every other year in her 43's then annually at age 49         Decreased libido 09/05/2023     Overview Note:     noted         Assessment & Plan Note:     D/w pt that in light of regular monthly menses that extremely unlikely hormonal in nature  Highly suspicious of known SE from anti-depressant meds  D/W pt lifestyle changes, meditation, etc.        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   Rx sent        Bilateral carpal tunnel syndrome 04/13/2022    Hidradenitis suppurativa of right axilla 03/10/2022    Anxiety and depression 11/02/2021    Psychophysiological insomnia 11/02/2021    Obesity, morbid ([address]) 08/04/2020      Problem List Items Addressed This Visit       Women's

## 2023-09-05 NOTE — PATIENT INSTRUCTIONS
We will call you if anything is abnormal from your testing today. You can start your birth control pills on the first Sunday after you next period starts. You should try to take them around the same time each day. Remember the pills may cause irregular bleeding for the first couple of months when starting pills. They will call you to make your mammogram appointment  Please come back as scheduled for a recheck and your ultrasound  Thanks for coming to see us today and letting us take care of you!

## 2023-09-05 NOTE — ASSESSMENT & PLAN NOTE
DDx:  nml variant (additional E2), post-BTL syndrome, anatomic  D/w pt presumptive treatment with OCPs, POP, IUD, Depo  Pt would like to restart OCPs - Rx sent  Will have pt RTC for pelvic US

## 2023-09-05 NOTE — PROGRESS NOTES
Pt comes in today for new AE. Pt states when she first moved here Dr. Sully Waite was going to give her a hormone cream but she did not want it due to her and her  splitting and was told it was going to increase her sex drive. Pt is now back together with him and is on depression medication and has no sex drive. LAST PAP:  pt states 2019     LAST MAMMO:  never    LMP:  Patient's last menstrual period was 08/26/2023 (exact date).     BIRTH CONTROL:  tubal ligation    TOBACCO USE:  No    FAMILY HISTORY OF:   Breast Cancer:  Yes   Ovarian Cancer:  No   Uterine Cancer:  No   Colon Cancer:  No    Vitals:    09/05/23 0933   BP: 128/80   Site: Left Upper Arm   Position: Sitting   Weight: 259 lb (117.5 kg)   Height: 5' 4\" (1.626 m)        Nubia Brown MA  09/05/23  9:44 AM

## 2023-09-10 LAB
C TRACH RRNA CVX QL NAA+PROBE: NEGATIVE
CYTOLOGIST CVX/VAG CYTO: NORMAL
CYTOLOGY CVX/VAG DOC THIN PREP: NORMAL
HPV APTIMA: NEGATIVE
HPV GENOTYPE REFLEX: NORMAL
Lab: NORMAL
N GONORRHOEA RRNA CVX QL NAA+PROBE: NEGATIVE
PATH REPORT.FINAL DX SPEC: NORMAL
STAT OF ADQ CVX/VAG CYTO-IMP: NORMAL
T VAGINALIS RRNA SPEC QL NAA+PROBE: NEGATIVE

## 2023-10-02 ENCOUNTER — TELEPHONE (OUTPATIENT)
Dept: ORTHOPEDIC SURGERY | Age: 43
End: 2023-10-02

## 2023-10-02 NOTE — TELEPHONE ENCOUNTER
Per [employer] message sent to scheduling, patient wants to know if they can do the nerve testing on the same day as their appoinmtnet on 10/4/23?

## 2023-10-04 ENCOUNTER — OFFICE VISIT (OUTPATIENT)
Dept: ORTHOPEDIC SURGERY | Age: 43
End: 2023-10-04
Payer: COMMERCIAL

## 2023-10-04 DIAGNOSIS — M65.332 TRIGGER FINGER, LEFT MIDDLE FINGER: ICD-10-CM

## 2023-10-04 DIAGNOSIS — M18.11 ARTHRITIS OF CARPOMETACARPAL (CMC) JOINT OF RIGHT THUMB: ICD-10-CM

## 2023-10-04 DIAGNOSIS — M05.9 RHEUMATOID ARTHRITIS WITH POSITIVE RHEUMATOID FACTOR, INVOLVING UNSPECIFIED SITE (HCC): Primary | ICD-10-CM

## 2023-10-04 DIAGNOSIS — G56.03 CARPAL TUNNEL SYNDROME, BILATERAL UPPER LIMBS: ICD-10-CM

## 2023-10-04 PROCEDURE — 99215 OFFICE O/P EST HI 40 MIN: CPT | Performed by: NURSE PRACTITIONER

## 2023-10-04 RX ORDER — BETAMETHASONE SODIUM PHOSPHATE AND BETAMETHASONE ACETATE 3; 3 MG/ML; MG/ML
6 INJECTION, SUSPENSION INTRA-ARTICULAR; INTRALESIONAL; INTRAMUSCULAR; SOFT TISSUE ONCE
Status: COMPLETED | OUTPATIENT
Start: 2023-10-04 | End: 2023-10-04

## 2023-10-04 RX ADMIN — BETAMETHASONE SODIUM PHOSPHATE AND BETAMETHASONE ACETATE 6 MG: 3; 3 INJECTION, SUSPENSION INTRA-ARTICULAR; INTRALESIONAL; INTRAMUSCULAR; SOFT TISSUE at 12:07

## 2023-10-04 NOTE — PROGRESS NOTES
[facility] Note    Name: Josefina Arnold  YOB: 1980  Gender: female  MRN: [id number]      Follow up visit:   1. Rheumatoid arthritis with positive rheumatoid factor, involving unspecified site ([address])    2. Trigger finger, left middle finger    3. Arthritis of carpometacarpal (CMC) joint of right thumb    4. Carpal tunnel syndrome, bilateral upper limbs        HPI: Josefina Arnold is a 40 y.o. female who is following up for bilateral hand pain. She has been treated in the past for carpal tunnel syndrome and a left middle trigger finger. I last saw her in June 2022. She was currently followed by rheumatology. She was on methotrexate. She has been switched to diclofenac and hydroxychloroquine. She reports that regimen has been somewhat helpful. She notes that her hands are extremely painful. She said she is unable to do her hair. She is having difficulty at work. She is currently working on the computer and phone daily. She has not been back to see the rheumatologist.  She had an issue with her insurance. She notes she is having left middle finger pain as well as right thumb pain. She had nerve conduction studies done last year. She reports the carpal tunnel injection made her symptoms worse. She reports no relief in her symptoms following the carpal tunnel injection. ROS/Meds/PSH/PMH/FH/SH: I personally reviewed the patients standard intake form. Pertinents are discussed in the HPI    Physical Examination:    Musculoskeletal Examination:  Examination on the bilateral upper extremity demonstrates cap refill < 5 seconds in all fingers  Examination on the left demonstrates Normal sensation to light touch in the median distribution, normal sensation in ulnar and radial distribution, negative carpal tunnel compression testing and Phalen testing. positive tenderness of the left middle A1 pulley with palpable clicking and  negative   locking.  The extensor tendons

## 2023-10-05 ENCOUNTER — TELEPHONE (OUTPATIENT)
Dept: ORTHOPEDIC SURGERY | Age: 43
End: 2023-10-05

## 2023-10-05 PROBLEM — Z01.419 WOMEN'S ANNUAL ROUTINE GYNECOLOGICAL EXAMINATION: Status: RESOLVED | Noted: 2023-09-05 | Resolved: 2023-10-05

## 2023-10-05 PROBLEM — Z12.31 SCREENING MAMMOGRAM, ENCOUNTER FOR: Status: RESOLVED | Noted: 2023-09-05 | Resolved: 2023-10-05

## 2023-10-05 NOTE — TELEPHONE ENCOUNTER
She is needing to speak to someone about her work note and returning to work. She is asking for a return call.

## 2023-10-05 NOTE — TELEPHONE ENCOUNTER
I returned patient call- Work note has been updated and faxed. She understands to call back with any other questions or concerns.

## 2023-10-10 ENCOUNTER — OFFICE VISIT (OUTPATIENT)
Dept: OBGYN CLINIC | Age: 43
End: 2023-10-10
Payer: COMMERCIAL

## 2023-10-10 VITALS
WEIGHT: 249 LBS | BODY MASS INDEX: 42.51 KG/M2 | DIASTOLIC BLOOD PRESSURE: 68 MMHG | SYSTOLIC BLOOD PRESSURE: 112 MMHG | HEIGHT: 64 IN

## 2023-10-10 DIAGNOSIS — N89.8 VAGINAL PRURITUS: ICD-10-CM

## 2023-10-10 DIAGNOSIS — N89.8 VAGINAL DISCHARGE: ICD-10-CM

## 2023-10-10 PROCEDURE — 99214 OFFICE O/P EST MOD 30 MIN: CPT | Performed by: OBSTETRICS & GYNECOLOGY

## 2023-10-10 RX ORDER — LORAZEPAM 1 MG/1
1 TABLET ORAL EVERY 6 HOURS PRN
COMMUNITY

## 2023-10-10 NOTE — ASSESSMENT & PLAN NOTE
All previous notes, labs and/or imaging performed by Urgent Care were reviewed and confirmed with pt today. I interpreted these results and D/W pt my opinion and recommendations accordingly.    D/W pt more likely than not candida resistant to Diflucan - Rx for terazol sent  Will send Nuswab to R/O any concomitant infection that may need treating also

## 2023-10-10 NOTE — PATIENT INSTRUCTIONS
We will call you if anything is abnormal from your testing today. Use the cream as directed  Follow up as needed or you next scheduled appointment. Thanks for coming to see us today and letting us take care of you!

## 2023-10-10 NOTE — PROGRESS NOTES
[facility]  [place], [address], [address] [facility]    Chika Engle MD, Rashmi Corrales, [facility]  Tricia Garcia MD, FACOG    Assessment/Plan     Patient Active Problem List    Diagnosis Date Noted    Vaginal discharge 10/10/2023     Overview Note:     noted       Assessment & Plan Note:     See A&P under vag pruritis      Vaginal pruritus 10/10/2023     Overview Note:     noted       Assessment & Plan Note:     All previous notes, labs and/or imaging performed by Urgent Care were reviewed and confirmed with pt today. I interpreted these results and D/W pt my opinion and recommendations accordingly. D/W pt more likely than not candida resistant to Diflucan - Rx for terazol sent  Will send Nuswab to R/O any concomitant infection that may need treating also      Trigger finger, left middle finger 10/04/2023    Arthritis of carpometacarpal Lane) joint of right thumb 10/04/2023    Decreased libido 09/05/2023     Overview Note:     noted      Menorrhagia with regular cycle 09/05/2023     Overview Note:     noted      Dysmenorrhea 09/05/2023     Overview Note:     noted      HSV-2 infection 09/05/2023     Overview Note:     noted      Carpal tunnel syndrome, bilateral upper limbs 04/13/2022    Hidradenitis suppurativa of right axilla 03/10/2022    Anxiety and depression 11/02/2021    Psychophysiological insomnia 11/02/2021    Obesity, morbid ([address]) 08/04/2020       Problem List Items Addressed This Visit       Vaginal discharge     See A&P under vag pruritis         Relevant Orders    NuSwab Vaginitis Plus (VG+) with Canddia (Six Species)    Genital Mycoplasmas KADEEM, Swab    Vaginal pruritus     All previous notes, labs and/or imaging performed by Urgent Care were reviewed and confirmed with pt today. I interpreted these results and D/W pt my opinion and recommendations accordingly.    D/W pt more likely than not candida resistant to Diflucan - Rx for terazol sent  Will send Nuswab to

## 2023-10-10 NOTE — PROGRESS NOTES
Patient here for concerns of vaginal itching that has persisted even after diflucan x2 and flagyl. Patient went to urgent care on 9/30/2023 for this issue. LAST PAP:  9/5/2023, neg., HPV neg. LAST MAMMO:  never     LMP:  Patient's last menstrual period was 09/21/2023 (exact date).     BIRTH CONTROL:  tubal ligation    TOBACCO USE:  No    FAMILY HISTORY OF:   Breast Cancer:  Yes   Ovarian Cancer:  No   Uterine Cancer:  No   Colon Cancer:  No    Vitals:    10/10/23 0813   BP: 112/68   Site: Left Upper Arm   Position: Sitting   Weight: 249 lb (112.9 kg)   Height: 5' 4\" (1.626 m)        Dalton Vargas RN  10/10/23  8:23 AM

## 2023-10-12 ENCOUNTER — TELEPHONE (OUTPATIENT)
Dept: OBGYN CLINIC | Age: 43
End: 2023-10-12

## 2023-10-12 DIAGNOSIS — Z22.39 CARRIER OF UREAPLASMA UREALYTICUM: Primary | ICD-10-CM

## 2023-10-12 LAB
M GENITALIUM DNA SPEC QL NAA+PROBE: NEGATIVE
M HOMINIS DNA SPEC QL NAA+PROBE: NEGATIVE
SPECIMEN SOURCE: ABNORMAL
UREAPLASMA DNA SPEC QL NAA+PROBE: POSITIVE

## 2023-10-12 NOTE — TELEPHONE ENCOUNTER
----- Message from Boone Londono MD sent at 10/12/2023  4:06 PM EDT -----  Please let pt know that her swab did confirm resistant yeast that the cream will cover but als0 showed ureaplasm.   We can send in a Z-pack for her for this

## 2023-10-14 LAB
A VAGINAE DNA VAG QL NAA+PROBE: ABNORMAL SCORE
BVAB2 DNA VAG QL NAA+PROBE: ABNORMAL SCORE
C ALBICANS DNA VAG QL NAA+PROBE: POSITIVE
C GLABRATA DNA VAG QL NAA+PROBE: POSITIVE
C TRACH RRNA SPEC QL NAA+PROBE: NEGATIVE
MEGA1 DNA VAG QL NAA+PROBE: ABNORMAL SCORE
N GONORRHOEA RRNA SPEC QL NAA+PROBE: NEGATIVE
T VAGINALIS RRNA SPEC QL NAA+PROBE: NEGATIVE

## 2023-10-17 LAB
A VAGINAE DNA VAG QL NAA+PROBE: ABNORMAL SCORE
BVAB2 DNA VAG QL NAA+PROBE: ABNORMAL SCORE
C ALBICANS DNA VAG QL NAA+PROBE: POSITIVE
C GLABRATA DNA VAG QL NAA+PROBE: POSITIVE
C TRACH RRNA SPEC QL NAA+PROBE: NEGATIVE
CANDIDA KRUSEI: NEGATIVE
CANDIDA LUSITANIAE, NAA: NEGATIVE
CANDIDA PARAPSILOSIS/TROPICALIS: NEGATIVE
MEGA1 DNA VAG QL NAA+PROBE: ABNORMAL SCORE
N GONORRHOEA RRNA SPEC QL NAA+PROBE: NEGATIVE
T VAGINALIS RRNA SPEC QL NAA+PROBE: NEGATIVE

## 2023-10-17 RX ORDER — AZITHROMYCIN 500 MG/1
TABLET, FILM COATED ORAL
Qty: 5 TABLET | Refills: 0 | Status: SHIPPED | OUTPATIENT
Start: 2023-10-17

## 2023-10-18 ENCOUNTER — OFFICE VISIT (OUTPATIENT)
Dept: PRIMARY CARE CLINIC | Facility: CLINIC | Age: 43
End: 2023-10-18
Payer: COMMERCIAL

## 2023-10-18 VITALS
RESPIRATION RATE: 16 BRPM | WEIGHT: 247 LBS | HEIGHT: 64 IN | DIASTOLIC BLOOD PRESSURE: 71 MMHG | BODY MASS INDEX: 42.17 KG/M2 | OXYGEN SATURATION: 98 % | HEART RATE: 65 BPM | TEMPERATURE: 97.5 F | SYSTOLIC BLOOD PRESSURE: 111 MMHG

## 2023-10-18 DIAGNOSIS — N92.0 MENORRHAGIA WITH REGULAR CYCLE: ICD-10-CM

## 2023-10-18 DIAGNOSIS — D50.0 IRON DEFICIENCY ANEMIA DUE TO CHRONIC BLOOD LOSS: ICD-10-CM

## 2023-10-18 DIAGNOSIS — E66.01 OBESITY, MORBID (HCC): ICD-10-CM

## 2023-10-18 DIAGNOSIS — M05.9 SEROPOSITIVE RHEUMATOID ARTHRITIS (HCC): ICD-10-CM

## 2023-10-18 DIAGNOSIS — F41.9 ANXIETY AND DEPRESSION: ICD-10-CM

## 2023-10-18 DIAGNOSIS — F51.04 PSYCHOPHYSIOLOGICAL INSOMNIA: ICD-10-CM

## 2023-10-18 DIAGNOSIS — M05.79 RHEUMATOID ARTHRITIS INVOLVING MULTIPLE SITES WITH POSITIVE RHEUMATOID FACTOR (HCC): Primary | ICD-10-CM

## 2023-10-18 DIAGNOSIS — G89.29 OTHER CHRONIC PAIN: ICD-10-CM

## 2023-10-18 DIAGNOSIS — Z79.899 MEDICATION MANAGEMENT: ICD-10-CM

## 2023-10-18 DIAGNOSIS — F32.A ANXIETY AND DEPRESSION: ICD-10-CM

## 2023-10-18 PROBLEM — L73.2 HIDRADENITIS SUPPURATIVA OF RIGHT AXILLA: Status: RESOLVED | Noted: 2022-03-10 | Resolved: 2023-10-18

## 2023-10-18 PROBLEM — M18.11 ARTHRITIS OF CARPOMETACARPAL (CMC) JOINT OF RIGHT THUMB: Status: RESOLVED | Noted: 2023-10-04 | Resolved: 2023-10-18

## 2023-10-18 PROBLEM — M65.332 TRIGGER FINGER, LEFT MIDDLE FINGER: Status: RESOLVED | Noted: 2023-10-04 | Resolved: 2023-10-18

## 2023-10-18 PROBLEM — N89.8 VAGINAL DISCHARGE: Status: RESOLVED | Noted: 2023-10-10 | Resolved: 2023-10-18

## 2023-10-18 PROBLEM — N89.8 VAGINAL PRURITUS: Status: RESOLVED | Noted: 2023-10-10 | Resolved: 2023-10-18

## 2023-10-18 PROBLEM — N94.6 DYSMENORRHEA: Status: RESOLVED | Noted: 2023-09-05 | Resolved: 2023-10-18

## 2023-10-18 PROCEDURE — 99214 OFFICE O/P EST MOD 30 MIN: CPT | Performed by: FAMILY MEDICINE

## 2023-10-18 RX ORDER — DULOXETIN HYDROCHLORIDE 30 MG/1
30 CAPSULE, DELAYED RELEASE ORAL DAILY
Qty: 30 CAPSULE | Refills: 3 | Status: SHIPPED | OUTPATIENT
Start: 2023-10-18

## 2023-10-18 RX ORDER — FOLIC ACID 1 MG/1
1 TABLET ORAL DAILY
Qty: 90 TABLET | Refills: 1 | Status: SHIPPED | OUTPATIENT
Start: 2023-10-18

## 2023-10-18 RX ORDER — DICLOFENAC SODIUM 75 MG/1
TABLET, DELAYED RELEASE ORAL
Qty: 60 TABLET | Refills: 1 | Status: SHIPPED | OUTPATIENT
Start: 2023-10-18

## 2023-10-18 RX ORDER — MULTIVIT-MIN/IRON FUM/FOLIC AC 7.5 MG-4
1 TABLET ORAL DAILY
Qty: 100 TABLET | Refills: 3 | Status: SHIPPED | OUTPATIENT
Start: 2023-10-18

## 2023-10-18 RX ORDER — PANTOPRAZOLE SODIUM 40 MG/1
40 TABLET, DELAYED RELEASE ORAL
Qty: 30 TABLET | Refills: 5 | Status: SHIPPED | OUTPATIENT
Start: 2023-10-18

## 2023-10-18 ASSESSMENT — PATIENT HEALTH QUESTIONNAIRE - PHQ9
SUM OF ALL RESPONSES TO PHQ QUESTIONS 1-9: 0
SUM OF ALL RESPONSES TO PHQ9 QUESTIONS 1 & 2: 0
1. LITTLE INTEREST OR PLEASURE IN DOING THINGS: 0
SUM OF ALL RESPONSES TO PHQ QUESTIONS 1-9: 0
SUM OF ALL RESPONSES TO PHQ QUESTIONS 1-9: 0
2. FEELING DOWN, DEPRESSED OR HOPELESS: 0
SUM OF ALL RESPONSES TO PHQ QUESTIONS 1-9: 0

## 2023-10-18 ASSESSMENT — ENCOUNTER SYMPTOMS
WHEEZING: 0
VOICE CHANGE: 0
COLOR CHANGE: 0
EYE DISCHARGE: 0
SORE THROAT: 0
NAUSEA: 0
ABDOMINAL DISTENTION: 0
SHORTNESS OF BREATH: 0
SINUS PRESSURE: 0
CONSTIPATION: 0
TROUBLE SWALLOWING: 0
PHOTOPHOBIA: 0
VOMITING: 0
CHOKING: 0
ABDOMINAL PAIN: 0
EYE PAIN: 0
COUGH: 0
CHEST TIGHTNESS: 0
SINUS PAIN: 0
DIARRHEA: 0
RHINORRHEA: 0
BLOOD IN STOOL: 0
BACK PAIN: 0
EYE REDNESS: 0

## 2023-10-18 NOTE — PATIENT INSTRUCTIONS
Keep follow-up with rheumatology for management of rheumatoid arthritis multiple pains medication management meantime continue diclofenac. Will need disease modifying agent    Behavioral health follow-up for management of anxiety depression. Meantime continue Cymbalta. Comfortable shoes recommended to may need to have bathroom breaks when necessary, note for work    Benzodiazepines such as Klonopin Ativan Valium Xanax carry long-term significant risk including increased risk of fall habit-forming affected driving or sedative similar to alcohol not recommended long-term, follow-up with psychiatrist to discuss today if appropriate.   Recommend fluoxetine or Cymbalta which is stimulant increased serotonin Goodfeel hormone may help with anxiety depression, is not habit-forming may improve function job performance and does not interfere with driving and not associated with increased risk of falls as with benzodiazepines

## 2023-10-18 NOTE — PROGRESS NOTES
Here for follow-up of her numerous medical problems. Chronic multiple joint pains including hands rheumatoid factor positive high sed rate. Otology consultation pending. Previously was on methotrexate. Need follow-up as soon as possible for further management meantime diclofenac. Hypertension controlled. Anxiety depression has upcoming behavioral health follow-up for further management. Requested lorazepam refill. However recommended discussed with behavioral health appropriate medication meantime continue Cymbalta. Hemoglobin A1c lipid profile previously normal no smoking alcohol or drug abuse. Morbid obesity BMI 42. Has lost some weight. Complains of some leg and feet swelling with NSAIDs for longer hours. Requested note for work to be able to use the bathroom when need to go to bathroom due to some diuretic use and leg swelling. Flu shot recommended declined today preventative care discussed. Menorrhagia anemia OB/GYN follow-up. Also for birth control management    Review of Systems   Constitutional:  Positive for fatigue. Negative for activity change, appetite change, chills, diaphoresis, fever and unexpected weight change. HENT:  Negative for congestion, dental problem, ear pain, hearing loss, nosebleeds, rhinorrhea, sinus pressure, sinus pain, sore throat, trouble swallowing and voice change. Eyes:  Negative for photophobia, pain, discharge, redness and visual disturbance. Respiratory:  Negative for cough, choking, chest tightness, shortness of breath and wheezing. Cardiovascular:  Negative for chest pain, palpitations and leg swelling. Gastrointestinal:  Negative for abdominal distention, abdominal pain, blood in stool, constipation, diarrhea, nausea and vomiting. Endocrine: Negative for polydipsia, polyphagia and polyuria. Genitourinary:  Positive for menstrual problem.  Negative for difficulty urinating, dyspareunia, dysuria, frequency, genital sores, hematuria, urgency,

## 2023-10-25 ENCOUNTER — OFFICE VISIT (OUTPATIENT)
Dept: SURGERY | Age: 43
End: 2023-10-25
Payer: COMMERCIAL

## 2023-10-25 VITALS
HEIGHT: 64 IN | WEIGHT: 245 LBS | DIASTOLIC BLOOD PRESSURE: 74 MMHG | BODY MASS INDEX: 41.83 KG/M2 | SYSTOLIC BLOOD PRESSURE: 142 MMHG | HEART RATE: 69 BPM

## 2023-10-25 DIAGNOSIS — K21.9 GASTROESOPHAGEAL REFLUX DISEASE, UNSPECIFIED WHETHER ESOPHAGITIS PRESENT: Primary | ICD-10-CM

## 2023-10-25 DIAGNOSIS — M05.9 RHEUMATOID ARTHRITIS WITH POSITIVE RHEUMATOID FACTOR, INVOLVING UNSPECIFIED SITE (HCC): ICD-10-CM

## 2023-10-25 DIAGNOSIS — E65 ABDOMINAL OBESITY: ICD-10-CM

## 2023-10-25 DIAGNOSIS — E66.01 CLASS 3 SEVERE OBESITY DUE TO EXCESS CALORIES WITH SERIOUS COMORBIDITY AND BODY MASS INDEX (BMI) OF 40.0 TO 44.9 IN ADULT (HCC): ICD-10-CM

## 2023-10-25 DIAGNOSIS — K21.9 GASTROESOPHAGEAL REFLUX DISEASE, UNSPECIFIED WHETHER ESOPHAGITIS PRESENT: ICD-10-CM

## 2023-10-25 LAB
25(OH)D3 SERPL-MCNC: 21.4 NG/ML (ref 30–100)
EST. AVERAGE GLUCOSE BLD GHB EST-MCNC: 105 MG/DL
HBA1C MFR BLD: 5.3 % (ref 4.8–5.6)
HCG UR QL: NEGATIVE

## 2023-10-25 PROCEDURE — 99205 OFFICE O/P NEW HI 60 MIN: CPT | Performed by: SURGERY

## 2023-10-25 RX ORDER — SERTRALINE HYDROCHLORIDE 100 MG/1
100 TABLET, FILM COATED ORAL DAILY
COMMUNITY

## 2023-11-10 ENCOUNTER — HOSPITAL ENCOUNTER (OUTPATIENT)
Dept: NON INVASIVE DIAGNOSTICS | Age: 43
Discharge: HOME | End: 2023-11-12
Attending: SURGERY
Payer: COMMERCIAL

## 2023-11-10 DIAGNOSIS — K21.9 GASTROESOPHAGEAL REFLUX DISEASE, UNSPECIFIED WHETHER ESOPHAGITIS PRESENT: ICD-10-CM

## 2023-11-10 LAB
EKG ATRIAL RATE: 74 BPM
EKG DIAGNOSIS: NORMAL
EKG P AXIS: -7 DEGREES
EKG P-R INTERVAL: 146 MS
EKG Q-T INTERVAL: 414 MS
EKG QRS DURATION: 84 MS
EKG QTC CALCULATION (BAZETT): 459 MS
EKG R AXIS: 19 DEGREES
EKG T AXIS: 48 DEGREES
EKG VENTRICULAR RATE: 74 BPM

## 2023-11-10 PROCEDURE — 93010 ELECTROCARDIOGRAM REPORT: CPT | Performed by: INTERNAL MEDICINE

## 2023-11-10 PROCEDURE — 93005 ELECTROCARDIOGRAM TRACING: CPT

## 2023-11-14 ENCOUNTER — CLINICAL DOCUMENTATION (OUTPATIENT)
Dept: OTHER | Age: 43
End: 2023-11-14

## 2023-11-14 NOTE — PROGRESS NOTES
Gaby was seen today for exercise assessment. Six ' walk test was no problem and had normal HR and BP response. She stated that she will be using our gym for the month.

## 2023-12-06 ENCOUNTER — OFFICE VISIT (OUTPATIENT)
Dept: ORTHOPEDIC SURGERY | Age: 43
End: 2023-12-06
Payer: COMMERCIAL

## 2023-12-06 DIAGNOSIS — G56.01 RIGHT CARPAL TUNNEL SYNDROME: Primary | ICD-10-CM

## 2023-12-06 DIAGNOSIS — M05.9 RHEUMATOID ARTHRITIS WITH POSITIVE RHEUMATOID FACTOR, INVOLVING UNSPECIFIED SITE (HCC): ICD-10-CM

## 2023-12-06 DIAGNOSIS — M65.332 TRIGGER FINGER, LEFT MIDDLE FINGER: ICD-10-CM

## 2023-12-06 PROCEDURE — 99214 OFFICE O/P EST MOD 30 MIN: CPT | Performed by: NURSE PRACTITIONER

## 2023-12-06 PROCEDURE — L3908 WHO COCK-UP NONMOLDE PRE OTS: HCPCS | Performed by: NURSE PRACTITIONER

## 2023-12-06 NOTE — PROGRESS NOTES
Orthopaedic Hand Clinic Note    Name: Shona Weeks  YOB: 1980  Gender: female  MRN: [id number]      Follow up visit:   1. Right carpal tunnel syndrome    2. Trigger finger, left middle finger    3. Rheumatoid arthritis with positive rheumatoid factor, involving unspecified site [facility])        HPI: Shona Weeks is a 44 y.o. female who is following up for bilateral hand and wrist pain. She received an injection for left middle trigger finger and right thumb CMC arthritis at her last visit. She reports the middle trigger finger is better. She has not had any relief of the right thumb. She says it is very difficult to do even activities of daily living. She is scheduled to see Dr. Manuel Barahona with rheumatology tomorrow at 6122. She is currently doubling up on her diclofenac and hydroxychloroquine. She reports GI upset. She has not been able to work since November 3. ROS/Meds/PSH/PMH/FH/SH: I personally reviewed the patients standard intake form. Pertinents are discussed in the HPI    Physical Examination:    Musculoskeletal Examination:  Examination on the bilateral upper extremity demonstrates cap refill < 5 seconds in all fingers  Examination on the left demonstrates Normal sensation to light touch in the median distribution, normal sensation in ulnar and radial distribution, negative carpal tunnel compression testing and Phalen testing. Negative tenderness of the left middle A1 pulley with palpable clicking and  negative   locking. The extensor tendons all track well over the MCP joints. Examination of the right upper extremity demonstrates normal sensation in median, ulnar and radial distribution, negative carpal tunnel compression and Phalen test, cap refill < 5 seconds in all fingers. Mild prominence and instability of the base of the first metacarpal. CMC grind is positive for pain and crepitus. Thumb MCP joint hyperextends 0 degrees. No pain at the radial styloid.

## 2023-12-06 NOTE — PROGRESS NOTES
Patient was fitted and instructed on an  Wrist Splint for patients right wrist. Patient is aware the hand slides in the brace with the thumb placed threw the thumb hole. I demonstrated the correct way to tighten the brace straps to allow for a comfortable fit. Patient understood the correct way to wear the brace. Patient read and signed documenting they understand and agree to [facility]'s current DME return policy.

## 2024-01-08 ENCOUNTER — OFFICE VISIT (OUTPATIENT)
Dept: SURGERY | Age: 44
End: 2024-01-08

## 2024-01-08 DIAGNOSIS — Z71.3 NUTRITIONAL COUNSELING: Primary | ICD-10-CM

## 2024-01-08 NOTE — PROGRESS NOTES
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   Restaurant/Fast Food Intake: None  Typical Beverage Consumption: Water, energy drinks (zero sugar), and coffee  Food Allergies: Cantaloupe  Cultural Preferences: None  Diet Recall:   Breakfast: None  Lunch: Seared chicken with pickles  Dinner: None     Lifestyle Assessment:    Hours of sleep/night: ~Depends; 5-7 hrs   Current Occupation: Currently none    Number of people living in house and influence: 3 people including pt, , and daughter (15 y.o), good influence    Exercise Assessment:   PAR-Q: No contraindications to exercise  Medical:     Pain or injuries (present): Rheumatoid Arthritis     Past surgeries related to mobility: Right ankle reconstruction  Exercise equipment at home: Punching bag, rower, bike, dumbbell    Gym Membership: [email or web address]  Current Exercise Routine: Going to gym 4-5 x weekly for 2 hrs  Assessment Exercise Goal: Continue current exercise   DIAGNOSIS:  Class III obesity R/T hx of yoyo dieting and excessive energy intake as evidenced by BMI = 42.72 and 171 % IBW.     Intervention:   Evaluated diet recall and identified modifications.  Educated pt on mindful eating techniques and macronutrients.  Encouraged continued and increased activity.          Monitoring and Evaluation:  Monitor for continued safe, supervised weight loss for OM   F/U per MD Nadine Simpson MBA, Nutritionist, RD eligible

## 2024-01-09 ENCOUNTER — OFFICE VISIT (OUTPATIENT)
Dept: SURGERY | Age: 44
End: 2024-01-09
Payer: COMMERCIAL

## 2024-01-09 VITALS
DIASTOLIC BLOOD PRESSURE: 80 MMHG | HEIGHT: 64 IN | SYSTOLIC BLOOD PRESSURE: 137 MMHG | HEART RATE: 77 BPM | BODY MASS INDEX: 38.58 KG/M2 | WEIGHT: 226 LBS

## 2024-01-09 DIAGNOSIS — K21.9 GASTROESOPHAGEAL REFLUX DISEASE, UNSPECIFIED WHETHER ESOPHAGITIS PRESENT: ICD-10-CM

## 2024-01-09 DIAGNOSIS — M05.9 RHEUMATOID ARTHRITIS WITH POSITIVE RHEUMATOID FACTOR, INVOLVING UNSPECIFIED SITE (HCC): ICD-10-CM

## 2024-01-09 DIAGNOSIS — E65 ABDOMINAL OBESITY: ICD-10-CM

## 2024-01-09 DIAGNOSIS — E66.01 CLASS 3 SEVERE OBESITY DUE TO EXCESS CALORIES WITH SERIOUS COMORBIDITY AND BODY MASS INDEX (BMI) OF 40.0 TO 44.9 IN ADULT (HCC): Primary | ICD-10-CM

## 2024-01-09 DIAGNOSIS — Z71.3 NUTRITIONAL COUNSELING: ICD-10-CM

## 2024-01-09 PROCEDURE — 99215 OFFICE O/P EST HI 40 MIN: CPT | Performed by: SURGERY

## 2024-01-09 RX ORDER — TIRZEPATIDE 2.5 MG/.5ML
2.5 INJECTION, SOLUTION SUBCUTANEOUS WEEKLY
Qty: 2 ML | Refills: 0 | Status: SHIPPED | OUTPATIENT
Start: 2024-01-09 | End: 2024-02-08

## 2024-01-09 RX ORDER — ERGOCALCIFEROL 1.25 MG/1
50000 CAPSULE ORAL WEEKLY
Qty: 4 CAPSULE | Refills: 0 | Status: SHIPPED | OUTPATIENT
Start: 2024-01-09 | End: 2024-02-08

## 2024-01-09 NOTE — PROGRESS NOTES
Emi Otero MD   Bariatric & Advanced Laparoscopic Surgery & Endoscopy  [address], [address]  [place]  [address]                                     Office [phone] Fax [phone]        Date of visit: 1/9/2024      History and Physical    Patient: Gaby Dinh MRN: [id number]     YOB: 1980  Age: 43 y.o.  Sex: female              PCP: None, None   Pharmacy:   [facility] [address] [place] [address] -  [phone] - F [phone]  [address] [place] [address]  Phone: [phone] Fax: [phone]     Date:  1/9/2024  Gaby Dinh  who presents to the [facility] for follow up after initial consultation to assist in weight loss.  We will review blood work results, medication options and discuss further our multi-disciplinary weight loss program.      She presents with a height of 1.613 m (5' 3.5\") and weight of 102.5 kg (226 lb), giving her a Body mass index is 39.41 kg/m²..        Initial Consult Date 10/25/2024   Initial Weight 245   Ideal Body Weight 143   Initial BMI 42.72   Initial Neck Circumference 15   Initial Waist Circumference 48.5   Initial Hip Circumference 51   Initial BF% 50.6       Medication TBD   Medication Start Date TBD       Today's Weight 102.5 kg (226 lb)   Today's BMI Body mass index is 39.41 kg/m².    Today's Neck Circumference 15.5   Today's Waist 44.25   Today's Hip 49.5   Today's BF% See initial       Lowest weight 145 lbs - 2014  Highest weight 269 lbs  Biggest challenge to weight loss - stress, lack of sleep    MEDICAL HISTORY:  Morbid Obesity   Rheumatoid Arthritis    Comorbidity Yes or No   Hypertension No   Hyperlipidemia No   Diabetes Mellitus No   Coronary Artery Disease No   Gastroesophageal Reflux  Treatment Med = pantoprazole Yes   Obstructive Sleep Apnea No   Cancer No   Asthma No   Osteoarthritis No   Joint Pain Yes      She denies dysphagia.       Other Yes or No

## 2024-01-15 DIAGNOSIS — G56.01 RIGHT CARPAL TUNNEL SYNDROME: Primary | ICD-10-CM

## 2024-01-29 ENCOUNTER — OFFICE VISIT (OUTPATIENT)
Dept: OBGYN CLINIC | Age: 44
End: 2024-01-29
Payer: COMMERCIAL

## 2024-01-29 ENCOUNTER — PROCEDURE VISIT (OUTPATIENT)
Dept: OBGYN CLINIC | Age: 44
End: 2024-01-29
Payer: COMMERCIAL

## 2024-01-29 VITALS
BODY MASS INDEX: 38.93 KG/M2 | SYSTOLIC BLOOD PRESSURE: 118 MMHG | DIASTOLIC BLOOD PRESSURE: 72 MMHG | WEIGHT: 228 LBS | HEIGHT: 64 IN

## 2024-01-29 DIAGNOSIS — N89.8 VAGINAL DISCHARGE: ICD-10-CM

## 2024-01-29 DIAGNOSIS — N61.0 CELLULITIS OF RIGHT BREAST: ICD-10-CM

## 2024-01-29 DIAGNOSIS — N39.46 MIXED INCONTINENCE URGE AND STRESS: ICD-10-CM

## 2024-01-29 DIAGNOSIS — N92.0 MENORRHAGIA WITH REGULAR CYCLE: Primary | ICD-10-CM

## 2024-01-29 PROCEDURE — 76830 TRANSVAGINAL US NON-OB: CPT | Performed by: OBSTETRICS & GYNECOLOGY

## 2024-01-29 PROCEDURE — 99459 PELVIC EXAMINATION: CPT | Performed by: OBSTETRICS & GYNECOLOGY

## 2024-01-29 PROCEDURE — 99214 OFFICE O/P EST MOD 30 MIN: CPT | Performed by: OBSTETRICS & GYNECOLOGY

## 2024-01-29 RX ORDER — SULFAMETHOXAZOLE AND TRIMETHOPRIM 800; 160 MG/1; MG/1
1 TABLET ORAL 2 TIMES DAILY
Qty: 14 TABLET | Refills: 0 | Status: SHIPPED | OUTPATIENT
Start: 2024-01-29 | End: 2024-02-05

## 2024-01-29 RX ORDER — FLUCONAZOLE 150 MG/1
150 TABLET ORAL ONCE
Qty: 1 TABLET | Refills: 0 | Status: SHIPPED | OUTPATIENT
Start: 2024-01-29 | End: 2024-01-29

## 2024-01-29 RX ORDER — PREDNISONE 10 MG/1
10 TABLET ORAL DAILY
COMMUNITY

## 2024-01-29 NOTE — ASSESSMENT & PLAN NOTE
D/W pt common occurrence after 3 C/S  Will trial bladder retraining and Kegel exercises for initial treatment  Will consider meds if fails this treatment modality

## 2024-01-29 NOTE — ASSESSMENT & PLAN NOTE
US ordered today, reviewed and D/W pt in detail -- essentially nml  Pt did not start OCPs - encouraged to begin so we can follow effects

## 2024-01-29 NOTE — PATIENT INSTRUCTIONS
Please do the Kegel exercises as we discussed. Make sure you try to go to the bathroom every 2-3 hours throughout the day, don't have anything to drink after dinner and go to the bathroom right before bed to help retrain your bladder to work better.   Please take your full dose of antibiotics and keep applying heat to the area on your breast.  If this continues to get larger and/or becomes more red/painful, you will need to be evaluated by a surgeon for possible drainage.  You can take the diflucan after you finish the antibiotics  You can start your birth control pills on the first Sunday after you next period starts. You should try to take them around the same time each day.  Remember the pills may cause irregular bleeding for the first couple of months when starting pills.   Follow up as needed or September for your yearly female exam.  Thanks for coming to see us today and letting us take care of you!

## 2024-01-29 NOTE — ASSESSMENT & PLAN NOTE
Appears physiologic today - pt has been using anti-bacterial soap and feminine washes  Encouraged to stop these and Dove/Ivory soap only  Will send Nuswab to R/O any pathogens

## 2024-01-30 NOTE — PROGRESS NOTES
Chaperone for Intimate Exam     Chaperone was offer accepted as part of the rooming process    Chaperone: Sara BERMUDEZ CMA     
Patient comes in today for gyn follow up with ultrasound. Patient would like to discuss valtrex being 1 gram instead of 500 mg. Patient states as of last week she noticed a small [place] on her right breast, but now it is an abscess. Patient states she had a fishy odor couple weeks ago. Patient would like to be checked since she had a bad yeast infection.     LAST PAP:  09/05/2023, Negative HPV Negative    LAST MAMMO:  Never    LMP:  Patient's last menstrual period was 01/08/2024 (approximate).    BIRTH CONTROL:  tubal ligation    TOBACCO USE:  No    FAMILY HISTORY OF:   Breast Cancer:  Yes   Ovarian Cancer:  No   Uterine Cancer:  No   Colon Cancer:  No    Vitals:    01/29/24 1322   BP: 118/72   Site: Left Upper Arm   Position: Sitting   Weight: 103.4 kg (228 lb)   Height: 1.626 m (5' 4\")        Nicole Soliman MA  01/29/24  1:36 PM  
cervix 2004    repeat normal    Anemia     Anxiety     Arthritis     Depression     managed with meds    Herpes genitalia     History of chicken pox             Past Surgical History:   Procedure Laterality Date     SECTION      x 3    COLPOSCOPY      DILATION AND CURETTAGE OF UTERUS  2011    Miscarriage    GYN  2018    tubes removed           Family History   Problem Relation Age of Onset    Hypertension Paternal Grandmother     Osteoporosis Maternal Grandmother     Breast Cancer Maternal Grandmother         lung. Pt actually not sure which was the primary. She  at 62    Diabetes Maternal Grandmother     Hypertension Father     Liver Disease Mother         hep c cirrhos    Depression Mother     Mental Illness Mother         depression     Labor Mother         all children    Osteoarthritis Maternal Aunt     Hypertension Paternal Aunt     Breast Cancer Maternal Great Grandmother     Ovarian Cancer Neg Hx     Uterine Cancer Neg Hx     Colon Cancer Neg Hx            Social History     Socioeconomic History    Marital status:      Spouse name: Not on file    Number of children: Not on file    Years of education: Not on file    Highest education level: Not on file   Occupational History    Not on file   Tobacco Use    Smoking status: Never    Smokeless tobacco: Never   Substance and Sexual Activity    Alcohol use: Not Currently    Drug use: Never    Sexual activity: Yes     Partners: Male     Birth control/protection: Surgical   Other Topics Concern    Not on file   Social History Narrative    Abuse: Feels safe at home, no history of physical abuse, no history of sexual abuse      Social Determinants of Health     Financial Resource Strain: Low Risk  (2023)    Overall Financial Resource Strain (CARDIA)     Difficulty of Paying Living Expenses: Not very hard   Food Insecurity: Not on file (2023)   Transportation Needs: Unknown (2023)    PRAPARE - Transportation     Lack of

## 2024-02-01 LAB
A VAGINAE DNA VAG QL NAA+PROBE: NORMAL SCORE
BVAB2 DNA VAG QL NAA+PROBE: NORMAL SCORE
C ALBICANS DNA VAG QL NAA+PROBE: NEGATIVE
C GLABRATA DNA VAG QL NAA+PROBE: NEGATIVE
C TRACH RRNA SPEC QL NAA+PROBE: NEGATIVE
CANDIDA KRUSEI: NEGATIVE
CANDIDA LUSITANIAE, NAA: NEGATIVE
CANDIDA PARAPSILOSIS/TROPICALIS: NEGATIVE
M GENITALIUM DNA SPEC QL NAA+PROBE: NEGATIVE
M HOMINIS DNA SPEC QL NAA+PROBE: NEGATIVE
MEGA1 DNA VAG QL NAA+PROBE: NORMAL SCORE
N GONORRHOEA RRNA SPEC QL NAA+PROBE: NEGATIVE
SPECIMEN SOURCE: ABNORMAL
T VAGINALIS RRNA SPEC QL NAA+PROBE: NEGATIVE
UREAPLASMA DNA SPEC QL NAA+PROBE: POSITIVE

## 2024-02-02 RX ORDER — AZITHROMYCIN 250 MG/1
250 TABLET, FILM COATED ORAL SEE ADMIN INSTRUCTIONS
Qty: 6 TABLET | Refills: 0 | Status: SHIPPED | OUTPATIENT
Start: 2024-02-02 | End: 2024-02-07

## 2024-02-02 NOTE — TELEPHONE ENCOUNTER
----- Message from Ashok Bosch MD sent at 2/1/2024  8:50 AM EST -----  Please let pt know that her swab was negative for all testing except Ureaplasma.  We can send in a Z-pack for her for this

## 2024-02-07 ENCOUNTER — OFFICE VISIT (OUTPATIENT)
Dept: ORTHOPEDIC SURGERY | Age: 44
End: 2024-02-07
Payer: COMMERCIAL

## 2024-02-07 DIAGNOSIS — M25.571 RIGHT ANKLE PAIN, UNSPECIFIED CHRONICITY: Primary | ICD-10-CM

## 2024-02-07 DIAGNOSIS — M79.2 LEG NEURALGIA, UNSPECIFIED LATERALITY: ICD-10-CM

## 2024-02-07 PROCEDURE — 99214 OFFICE O/P EST MOD 30 MIN: CPT | Performed by: ORTHOPAEDIC SURGERY

## 2024-02-07 RX ORDER — PREGABALIN 75 MG/1
75 CAPSULE ORAL 2 TIMES DAILY
Qty: 60 CAPSULE | Refills: 0 | Status: SHIPPED | OUTPATIENT
Start: 2024-02-07 | End: 2024-03-08

## 2024-02-07 RX ORDER — PREGABALIN 75 MG/1
75 CAPSULE ORAL 2 TIMES DAILY
Qty: 60 CAPSULE | Refills: 0 | Status: CANCELLED | OUTPATIENT
Start: 2024-02-07 | End: 2024-03-08

## 2024-02-07 NOTE — PROGRESS NOTES
Name: Gaby Dinh  YOB: 1980  Gender: female  MRN: [id number]    CC: Right ankle pain    HPI:   08/31/2021: Right ankle injury:   11/09/2021: Diagnosed: Right ankle inversion injury with possible peroneal tendon tearing  01/12/2022: Dr. Deras: Right ankle arthroscopy, peroneal tendon reconstruction  03/22/2022: Dr. Deras: Reflects recent injury during postop.  With hypersensitivity and allodynia.    January 2024: Increased ankle pain.  No trauma  02/07/2024: Initial visit: Right ankle pain    ROS/Meds/PSH/PMH/FH/SH: reviewed today    Tobacco:  reports that she has never smoked. She has never used smokeless tobacco.   RA: Simponi infusion: Plaquenil  Epic listed diagnoses: Type 2 diabetes     Physical Examination:  Patient appears to be alert and oriented with acceptable appearance.  No obvious distress or SOB  CV: appears to have acceptable vascular color and capillary refill  Neuro: appears to have mostly intact light touch sensation, but SPN neuralgia  Skin: Right lateral ankle fullness; hyperpigmented scar  MS: Standing: Pes planus: Gait appears full  Right = significant superficial peroneal nerve neuralgia  Right = lateral to posterior ankle/hindfoot pain  Right = full ankle/foot motion; 5/5 strength; no crepitance    XR: Right: Standing AP lateral mortise ankle plus AP oblique foot taken today with pes planus; no acute pathology appreciated; posterior heel enthesopathy  XR Impression:  As above      Reviewed Test/Records/Documents:   12/06/2023: A Lola Leon: Carpal tunnel    Injection: No indication    Assessment:    Right lateral ankle/hindfoot pain, superficial peroneal nerve neuralgia    Plan:   The patient and I discussed the above assessment. We explored treatment options.     It is hard to tell whether her neuralgic pain bothers her more than her ankle/hindfoot  She used Neurontin in the past but reports developing sores while on Neurontin  She used Lyrica in the past for

## 2024-02-08 ENCOUNTER — OFFICE VISIT (OUTPATIENT)
Dept: SURGERY | Age: 44
End: 2024-02-08
Payer: COMMERCIAL

## 2024-02-08 VITALS
BODY MASS INDEX: 38.58 KG/M2 | WEIGHT: 226 LBS | SYSTOLIC BLOOD PRESSURE: 117 MMHG | HEART RATE: 76 BPM | HEIGHT: 64 IN | DIASTOLIC BLOOD PRESSURE: 81 MMHG

## 2024-02-08 DIAGNOSIS — E66.01 CLASS 3 SEVERE OBESITY DUE TO EXCESS CALORIES WITH SERIOUS COMORBIDITY AND BODY MASS INDEX (BMI) OF 40.0 TO 44.9 IN ADULT (HCC): Primary | ICD-10-CM

## 2024-02-08 DIAGNOSIS — E65 ABDOMINAL OBESITY: ICD-10-CM

## 2024-02-08 DIAGNOSIS — Z71.3 NUTRITIONAL COUNSELING: ICD-10-CM

## 2024-02-08 DIAGNOSIS — M05.9 RHEUMATOID ARTHRITIS WITH POSITIVE RHEUMATOID FACTOR, INVOLVING UNSPECIFIED SITE (HCC): ICD-10-CM

## 2024-02-08 PROBLEM — E11.9 TYPE 2 DIABETES MELLITUS (HCC): Status: ACTIVE | Noted: 2024-02-08

## 2024-02-08 PROCEDURE — 99215 OFFICE O/P EST HI 40 MIN: CPT | Performed by: SURGERY

## 2024-02-08 RX ORDER — ORLISTAT 120 MG/1
120 CAPSULE ORAL
Qty: 90 CAPSULE | Refills: 0 | Status: SHIPPED | OUTPATIENT
Start: 2024-02-08 | End: 2024-03-09

## 2024-02-08 NOTE — PROGRESS NOTES
OBESITY MEDICINE NUTRITION REASSESSMENT     Assessment:    Pt reports good dietary compliance since last office visit. Pt is eating at least 3x/d. Pt is drinking water, espresso. Pt is exercising via walking and stairs.   Diet Recall:              Breakfast: 2 boiled egg whites and blueberries              Lunch: Braised beef taco              Dinner: Braised beef taco   Snack: 4 small pieces of thin crust cheese pizza     Intervention:   Evaluated diet recall and identified modifications.  Encouraged pt to focus on lean protein when choosing snacks  Encouraged continued and increased activity.       Encouraged pt to increase exercise intensity as able     Monitoring and Evaluation:  Monitor for continued safe, supervised weight loss for OM.    F/U per MD Nadine Simpson MBA, Nutritionist, RD eligible   
uptake in muscle and helps block glucose absorption in intestine. Side effects include lactic acidosis, nausea, vomiting, diarrhea, abdominal pain and altered taste.     3.         Encouraged patient to participate in our Bariatric Support Group.     4.         Advised that weight loss medication is only one part of a lifelong weight management program, and that sustainable weight loss will only come with major changes in eating habits, physical activity and behavior. Advised that obesity is a chronic disease and requires a commitment to self-management and long-term follow up.     5.         Nutrition Recommendations: please see dietitian notes.     6.         Exercise Recommendations: Exercise routine, incorporating both cardio and strength training, building up to 5x/wk for at least 300 minutes a week.       Scripts: Metformin 1000 mg BID, Orlistat 120 mg     FU 1 months - RD + MD        Time: I spent 40 minutes preparing to see patient (including chart review and preparation), obtaining and/or reviewing additional medical history, performing a physical exam and evaluation, documenting clinical information in the electronic health record, independently interpreting results, communicating results to patient, family or caregiver, and/or coordinating care.        Signed: Emi Otero MD  Bariatric & Minimally Invasive Surgery  2/8/2024

## 2024-02-13 RX ORDER — ERGOCALCIFEROL 1.25 MG/1
50000 CAPSULE ORAL WEEKLY
Qty: 4 CAPSULE | Refills: 0 | OUTPATIENT
Start: 2024-02-13 | End: 2024-03-14

## 2024-02-15 ENCOUNTER — EVALUATION (OUTPATIENT)
Age: 44
End: 2024-02-15

## 2024-02-15 DIAGNOSIS — M62.81 MUSCLE WEAKNESS: Primary | ICD-10-CM

## 2024-02-15 DIAGNOSIS — M79.2 LEG NEURALGIA, UNSPECIFIED LATERALITY: ICD-10-CM

## 2024-02-15 DIAGNOSIS — R26.9 IMPAIRED GAIT: ICD-10-CM

## 2024-02-15 DIAGNOSIS — M25.571 RIGHT ANKLE PAIN, UNSPECIFIED CHRONICITY: ICD-10-CM

## 2024-02-15 DIAGNOSIS — M25.571 CHRONIC PAIN OF RIGHT ANKLE: ICD-10-CM

## 2024-02-15 DIAGNOSIS — G89.29 CHRONIC PAIN OF RIGHT ANKLE: ICD-10-CM

## 2024-02-15 NOTE — PROGRESS NOTES
well  She feels clicking when moving her ankle.  She did start Lyrica that she just started.  She does have a walking boot that she will wear when walking for extended time.  She has recently been having flare up of RA and is seeking tx for this.      Received previous outpatient therapy? No    Pain Assessment:   Average Pain/symptom intensity (0-10 scale)  Last 24 hours: 3-4/10; general baseline   Last week (1-7 days): 8/10; extended standing, being on feet   How often do you feel symptoms? Constant (% of time)  Description: aching, burning, sharp, and stabbing  Aggravating factors:stairs, jogging, extended standing  Alleviating factors: hot shower w/ using body scrubs    Neuro screen: tingling to lateral ankle and pins/needles feeling    Social/Functional Hx:  How would you rate your overall health? very good  Pt lives with family in a(n) apartment with B handrails.   Current DME:  walking boot as needed  Work Status: Employed full time: [employer] (seated job)   currently out on short term disability  PLOF & Social Hx/Interests: Independent and active without physical limitations and participated in working out (lifting at gym  How much have your symptoms interfered with daily activities? Moderately  Current level of function: modified independent with pain w/ her activity    Patient Stated Goals: improve ability to raise onto toes, be able to do some jogging progressions    Medications: reviewed in chart      OBJECTIVE EXAMINATION     Functional Outcome Questionnaire:   Lower Extremity Functional Scale: 53/80= 66% function     Observation:     Swelling/Edema: minimal at times; compression helps.  Occurs when sitting at work  Palpation: TTP lateral incision, achilles distally      A/PROM Measures:    Right Left Comment         Ankle PF 45     Ankle DF 8     Ankle INV 38     Ankle EV 10     Ankle DF WB 35       Strength/MMT (0-5 Scale):   Right Left Comment   Knee Flexion 4+     Knee Extension 5

## 2024-02-19 ENCOUNTER — TREATMENT (OUTPATIENT)
Age: 44
End: 2024-02-19
Payer: COMMERCIAL

## 2024-02-19 DIAGNOSIS — M25.571 CHRONIC PAIN OF RIGHT ANKLE: ICD-10-CM

## 2024-02-19 DIAGNOSIS — M62.81 MUSCLE WEAKNESS: Primary | ICD-10-CM

## 2024-02-19 DIAGNOSIS — G89.29 CHRONIC PAIN OF RIGHT ANKLE: ICD-10-CM

## 2024-02-19 PROCEDURE — 97140 MANUAL THERAPY 1/> REGIONS: CPT | Performed by: PHYSICAL THERAPIST

## 2024-02-19 PROCEDURE — 97110 THERAPEUTIC EXERCISES: CPT | Performed by: PHYSICAL THERAPIST

## 2024-02-19 NOTE — PROGRESS NOTES
[facility] - [facility]  [address] [place] [id number]  Dept: [phone]      Physical Therapy Daily Note     Referring MD: Haroon Soto MD  Diagnosis:     ICD-10-CM    1. Muscle weakness  M62.81       2. Chronic pain of right ankle  M25.571     G89.29          Therapy precautions:None  Co-morbidities affecting plan of care: RA, chronic condition post op w/ neuralgia and hypersensitivity  Total Time: 55 min, Timed Procedure Codes: 55 min  Modifier needed: No  Visit count: 2     Chief complaints/history of injury:    Date symptoms began: 1/2022  Nature of condition: Chronic (continuous duration > 3 months)  Primary cause of current episode: Post-surgical  How did symptoms start: Pt reports after ankle reconstructive sx she had delayed PT and a general delayed rehab and pain.   She never was able to have supervised rehab b/c she lost her position at her job thus her insurance.  She has noticed w/ calf raises, jogging, and when cold outside she has inc pain in her ankle.  Her pain is in lateral ankle generally w/ stabbing into malleolus at times, clicking in achilles/posterior ankle w/ motion and then when jogging TCJ and into lateral ankle.  She has sensitivity around incisions as well  She feels clicking when moving her ankle.  She did start Lyrica that she just started.  She does have a walking boot that she will wear when walking for extended time.  She has recently been having flare up of RA and is seeking tx for this.    Patient Stated Goals: improve ability to raise onto toes, be able to do some jogging progressions      SUBJECTIVE     Pt reports she has been generally more sore from a workout sense, describes it as a \"good sore\".  Rates her symptoms 4/10 today    Medications: no changes since last session      OBJECTIVE      Functional Outcome Questionnaire:   Lower Extremity Functional Scale: 53/80= 66% function     A/PROM Measures:    Right Left Comment         Ankle PF

## 2024-02-20 ENCOUNTER — TELEPHONE (OUTPATIENT)
Dept: SURGERY | Age: 44
End: 2024-02-20

## 2024-02-20 NOTE — TELEPHONE ENCOUNTER
Patient called and LVM stating that the \"new medication\" that she was prescribed (Xenical) makes her feel as though she has weights \"at the low part of her stomach\". Any suggestions?

## 2024-02-21 ENCOUNTER — HOSPITAL ENCOUNTER (EMERGENCY)
Age: 44
Discharge: HOME | End: 2024-02-21
Attending: EMERGENCY MEDICINE
Payer: COMMERCIAL

## 2024-02-21 VITALS
WEIGHT: 211 LBS | DIASTOLIC BLOOD PRESSURE: 64 MMHG | BODY MASS INDEX: 37.39 KG/M2 | HEIGHT: 63 IN | OXYGEN SATURATION: 97 % | TEMPERATURE: 98.3 F | HEART RATE: 80 BPM | SYSTOLIC BLOOD PRESSURE: 111 MMHG | RESPIRATION RATE: 18 BRPM

## 2024-02-21 DIAGNOSIS — R51.9 NONINTRACTABLE HEADACHE, UNSPECIFIED CHRONICITY PATTERN, UNSPECIFIED HEADACHE TYPE: Primary | ICD-10-CM

## 2024-02-21 LAB
ANION GAP SERPL CALC-SCNC: 3 MMOL/L (ref 2–11)
BASOPHILS # BLD: 0 K/UL (ref 0–0.2)
BASOPHILS NFR BLD: 1 % (ref 0–2)
BILIRUB UR QL: NEGATIVE
BUN SERPL-MCNC: 12 MG/DL (ref 6–23)
CALCIUM SERPL-MCNC: 9.9 MG/DL (ref 8.3–10.4)
CHLORIDE SERPL-SCNC: 107 MMOL/L (ref 103–113)
CO2 SERPL-SCNC: 28 MMOL/L (ref 21–32)
CREAT SERPL-MCNC: 1.13 MG/DL (ref 0.6–1)
DIFFERENTIAL METHOD BLD: ABNORMAL
EOSINOPHIL # BLD: 0.1 K/UL (ref 0–0.8)
EOSINOPHIL NFR BLD: 2 % (ref 0.5–7.8)
ERYTHROCYTE [DISTWIDTH] IN BLOOD BY AUTOMATED COUNT: 16.2 % (ref 11.9–14.6)
FLUAV RNA SPEC QL NAA+PROBE: NOT DETECTED
FLUBV RNA SPEC QL NAA+PROBE: NOT DETECTED
GLUCOSE SERPL-MCNC: 111 MG/DL (ref 65–100)
GLUCOSE UR QL STRIP.AUTO: NEGATIVE MG/DL
HCG UR QL: NEGATIVE
HCT VFR BLD AUTO: 43.6 % (ref 35.8–46.3)
HGB BLD-MCNC: 13.5 G/DL (ref 11.7–15.4)
IMM GRANULOCYTES # BLD AUTO: 0 K/UL (ref 0–0.5)
IMM GRANULOCYTES NFR BLD AUTO: 0 % (ref 0–5)
KETONES UR-MCNC: 40 MG/DL
LEUKOCYTE ESTERASE UR QL STRIP: NEGATIVE
LYMPHOCYTES # BLD: 2.7 K/UL (ref 0.5–4.6)
LYMPHOCYTES NFR BLD: 32 % (ref 13–44)
MCH RBC QN AUTO: 25.8 PG (ref 26.1–32.9)
MCHC RBC AUTO-ENTMCNC: 31 G/DL (ref 31.4–35)
MCV RBC AUTO: 83.2 FL (ref 82–102)
MONOCYTES # BLD: 0.5 K/UL (ref 0.1–1.3)
MONOCYTES NFR BLD: 6 % (ref 4–12)
NEUTS SEG # BLD: 5.1 K/UL (ref 1.7–8.2)
NEUTS SEG NFR BLD: 59 % (ref 43–78)
NITRITE UR QL: NEGATIVE
NRBC # BLD: 0 K/UL (ref 0–0.2)
PH UR: 6 (ref 5–9)
PLATELET # BLD AUTO: 194 K/UL (ref 150–450)
PMV BLD AUTO: 13.2 FL (ref 9.4–12.3)
POTASSIUM SERPL-SCNC: 4.3 MMOL/L (ref 3.5–5.1)
PROT UR QL: NEGATIVE MG/DL
RBC # BLD AUTO: 5.24 M/UL (ref 4.05–5.2)
RBC # UR STRIP: NEGATIVE
SARS-COV-2 RDRP RESP QL NAA+PROBE: NOT DETECTED
SERVICE CMNT-IMP: ABNORMAL
SODIUM SERPL-SCNC: 138 MMOL/L (ref 136–146)
SOURCE: NORMAL
SP GR UR: 1.02 (ref 1–1.02)
UROBILINOGEN UR QL: 0.2 EU/DL (ref 0.2–1)
WBC # BLD AUTO: 8.5 K/UL (ref 4.3–11.1)

## 2024-02-21 PROCEDURE — 6360000002 HC RX W HCPCS: Performed by: EMERGENCY MEDICINE

## 2024-02-21 PROCEDURE — 81025 URINE PREGNANCY TEST: CPT

## 2024-02-21 PROCEDURE — 80048 BASIC METABOLIC PNL TOTAL CA: CPT

## 2024-02-21 PROCEDURE — 85025 COMPLETE CBC W/AUTO DIFF WBC: CPT

## 2024-02-21 PROCEDURE — 87635 SARS-COV-2 COVID-19 AMP PRB: CPT

## 2024-02-21 PROCEDURE — 2580000003 HC RX 258: Performed by: EMERGENCY MEDICINE

## 2024-02-21 PROCEDURE — 87502 INFLUENZA DNA AMP PROBE: CPT

## 2024-02-21 PROCEDURE — 96375 TX/PRO/DX INJ NEW DRUG ADDON: CPT

## 2024-02-21 PROCEDURE — 81003 URINALYSIS AUTO W/O SCOPE: CPT

## 2024-02-21 PROCEDURE — 96374 THER/PROPH/DIAG INJ IV PUSH: CPT

## 2024-02-21 PROCEDURE — 99284 EMERGENCY DEPT VISIT MOD MDM: CPT

## 2024-02-21 PROCEDURE — 96361 HYDRATE IV INFUSION ADD-ON: CPT

## 2024-02-21 RX ORDER — MORPHINE SULFATE 4 MG/ML
4 INJECTION INTRAVENOUS ONCE
Status: COMPLETED | OUTPATIENT
Start: 2024-02-21 | End: 2024-02-21

## 2024-02-21 RX ORDER — DIPHENHYDRAMINE HYDROCHLORIDE 50 MG/ML
25 INJECTION INTRAMUSCULAR; INTRAVENOUS
Status: COMPLETED | OUTPATIENT
Start: 2024-02-21 | End: 2024-02-21

## 2024-02-21 RX ORDER — 0.9 % SODIUM CHLORIDE 0.9 %
1000 INTRAVENOUS SOLUTION INTRAVENOUS
Status: COMPLETED | OUTPATIENT
Start: 2024-02-21 | End: 2024-02-21

## 2024-02-21 RX ORDER — ONDANSETRON 2 MG/ML
4 INJECTION INTRAMUSCULAR; INTRAVENOUS
Status: COMPLETED | OUTPATIENT
Start: 2024-02-21 | End: 2024-02-21

## 2024-02-21 RX ORDER — PROCHLORPERAZINE EDISYLATE 5 MG/ML
10 INJECTION INTRAMUSCULAR; INTRAVENOUS EVERY 6 HOURS PRN
Status: DISCONTINUED | OUTPATIENT
Start: 2024-02-21 | End: 2024-02-21 | Stop reason: HOSPADM

## 2024-02-21 RX ADMIN — SODIUM CHLORIDE 1000 ML: 9 INJECTION, SOLUTION INTRAVENOUS at 09:29

## 2024-02-21 RX ADMIN — DIPHENHYDRAMINE HYDROCHLORIDE 25 MG: 50 INJECTION INTRAMUSCULAR; INTRAVENOUS at 13:27

## 2024-02-21 RX ADMIN — PROCHLORPERAZINE EDISYLATE 10 MG: 5 INJECTION INTRAMUSCULAR; INTRAVENOUS at 13:28

## 2024-02-21 RX ADMIN — MORPHINE SULFATE 4 MG: 4 INJECTION, SOLUTION INTRAMUSCULAR; INTRAVENOUS at 13:29

## 2024-02-21 RX ADMIN — SODIUM CHLORIDE 1000 ML: 9 INJECTION, SOLUTION INTRAVENOUS at 13:39

## 2024-02-21 RX ADMIN — ONDANSETRON 4 MG: 2 INJECTION INTRAMUSCULAR; INTRAVENOUS at 09:30

## 2024-02-21 ASSESSMENT — ENCOUNTER SYMPTOMS
BLOOD IN STOOL: 0
VOMITING: 1
NAUSEA: 1
ABDOMINAL PAIN: 0
DIARRHEA: 0
CONSTIPATION: 0

## 2024-02-21 ASSESSMENT — VISUAL ACUITY
OD: 20/50
OS: 20/25
OU: 20/25

## 2024-02-21 ASSESSMENT — LIFESTYLE VARIABLES
HOW MANY STANDARD DRINKS CONTAINING ALCOHOL DO YOU HAVE ON A TYPICAL DAY: PATIENT DOES NOT DRINK
HOW OFTEN DO YOU HAVE A DRINK CONTAINING ALCOHOL: NEVER

## 2024-02-21 ASSESSMENT — PAIN DESCRIPTION - LOCATION
LOCATION: HEAD
LOCATION: HEAD

## 2024-02-21 ASSESSMENT — PAIN - FUNCTIONAL ASSESSMENT: PAIN_FUNCTIONAL_ASSESSMENT: 0-10

## 2024-02-21 ASSESSMENT — PAIN SCALES - GENERAL
PAINLEVEL_OUTOF10: 8
PAINLEVEL_OUTOF10: 9

## 2024-02-21 ASSESSMENT — PAIN DESCRIPTION - ORIENTATION: ORIENTATION: LEFT

## 2024-02-21 NOTE — ED NOTES
I have reviewed discharge instructions with the patient.  The patient verbalized understanding.    Patient left ED via Discharge Method: ambulatory to Home with [employer].    Opportunity for questions and clarification provided.       Patient given 0 scripts.         To continue your aftercare when you leave the hospital, you may receive an automated call from our care team to check in on how you are doing.  This is a free service and part of our promise to provide the best care and service to meet your aftercare needs.” If you have questions, or wish to unsubscribe from this service please call [phone].  Thank you for Choosing our [facility] Emergency Department.

## 2024-02-21 NOTE — ED TRIAGE NOTES
Patient has been unable to keep any liquids/food down for the last 2 days. She has been throwing up for 2 days. Last time was this morning at 3 am. Patient has been around friends that have Covid. Patient has chills/hot back and forth. She also has had a headache on the left side behind her eyes.

## 2024-02-21 NOTE — ED PROVIDER NOTES
URINE UROBILINOGEN POC 0.2 0.2 - 1.0 EU/dL    Nitrite, Urine, POC Negative NEG      Leukocyte Est, UA POC Negative NEG      Performed by: Gary Abreu    POC Pregnancy Urine Qual   Result Value Ref Range    Preg Test, Ur Negative NEG           No orders to display                Recent Labs     02/21/24  0848   COVID19 Not detected       Voice dictation software was used during the making of this note.  This software is not perfect and grammatical and other typographical errors may be present.  This note has not been completely proofread for errors.     Korey Jimenez MD  02/21/24 0201
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
## 2024-02-21 NOTE — DISCHARGE INSTRUCTIONS
As we discussed, you should discuss prescription for an abortive headache medication with your rheumatologist at your next appointment.  If your symptoms return or worsen in any way return to the ER for further evaluation.  If you begin to have any vision disturbance, you should follow-up with the ophthalmologist listed above.  It would be a good idea to call and get an appointment to establish care here in [place] with an ophthalmologist given your history.

## 2024-02-22 ENCOUNTER — OFFICE VISIT (OUTPATIENT)
Dept: SURGERY | Age: 44
End: 2024-02-22
Payer: COMMERCIAL

## 2024-02-22 VITALS
HEIGHT: 63 IN | HEART RATE: 85 BPM | WEIGHT: 210 LBS | BODY MASS INDEX: 37.21 KG/M2 | SYSTOLIC BLOOD PRESSURE: 122 MMHG | DIASTOLIC BLOOD PRESSURE: 77 MMHG

## 2024-02-22 DIAGNOSIS — E66.01 CLASS 3 SEVERE OBESITY DUE TO EXCESS CALORIES WITH SERIOUS COMORBIDITY AND BODY MASS INDEX (BMI) OF 40.0 TO 44.9 IN ADULT (HCC): Primary | ICD-10-CM

## 2024-02-22 DIAGNOSIS — E65 ABDOMINAL OBESITY: ICD-10-CM

## 2024-02-22 DIAGNOSIS — M05.9 RHEUMATOID ARTHRITIS WITH POSITIVE RHEUMATOID FACTOR, INVOLVING UNSPECIFIED SITE (HCC): ICD-10-CM

## 2024-02-22 DIAGNOSIS — Z71.3 NUTRITIONAL COUNSELING: ICD-10-CM

## 2024-02-22 PROCEDURE — 99215 OFFICE O/P EST HI 40 MIN: CPT | Performed by: SURGERY

## 2024-02-22 RX ORDER — SEMAGLUTIDE 0.25 MG/.5ML
0.25 INJECTION, SOLUTION SUBCUTANEOUS
Qty: 2 ML | Refills: 0 | Status: SHIPPED | OUTPATIENT
Start: 2024-02-22 | End: 2024-03-23

## 2024-02-22 NOTE — PROGRESS NOTES
Emi Otero MD   Bariatric & Advanced Laparoscopic Surgery & Endoscopy  [address] [facility], [address]  [place]  [address]                             Office [phone] Fax [phone]        Date of visit: 2/22/2024      History and Physical    Patient: Gaby Dinh MRN: [id number]     YOB: 1980  Age: 43 y.o.  Sex: female              PCP: Sheba Pcp   Pharmacy:   [facility] [address] [place] [address] - P [phone] - F [phone]  [address] [place] [address]  Phone: [phone] Fax: [phone]     Date:  2/22/2024        1.5 month(s) post medication start  She has lost,  19 lbs. since her last office visit.    How is patient taking medication? Metformin 500 mg BID Orlistat 120 mg 3x/day       She reports Orlistat makes her stomach feel heavy. She reports having less appetite.    Clinical Assessment and Physical Exam:    she is doing very well today and is feeling good. She reports that she has been adhering to the protein first diet without difficulty.  She denies any tachycardia, abdominal pain, nausea or vomiting.  She does report having problems with constipation.     Protein:  60 grams per day  Fluids:    64+ ounces per day  Physical Activity:  walking and stairs    Initial Consult Date 10/25/2023   Initial Weight 245   Ideal Body Weight 143   Initial BMI 42.72   Initial Neck Circumference 15   Initial Waist Circumference 48.5   Initial Hip Circumference 51   Initial BF% 50.6       Medication See history below   Medication Start Date 1/9/2024       Today's Weight 95.3 kg (210 lb)   Today's BMI Body mass index is 37.2 kg/m².    Today's Neck Circumference 13   Today's Waist 44   Today's Hip 47   Today's BF% 50.2       Evaluation of Co-morbid Conditions  Sleep Apnea No, uses CPAP- NA   Diabetes No, insulin dependent- NA   Hypertension No, number of medications- 0   GERD Yes, treatment medication- Pantoprazole

## 2024-02-23 ENCOUNTER — TELEPHONE (OUTPATIENT)
Dept: PRIMARY CARE CLINIC | Facility: CLINIC | Age: 44
End: 2024-02-23

## 2024-02-23 ENCOUNTER — TELEPHONE (OUTPATIENT)
Dept: SURGERY | Age: 44
End: 2024-02-23

## 2024-02-23 NOTE — TELEPHONE ENCOUNTER
Called patient regarding a needed appointment.  Patient would be New to Provider and informed we do not have this availability as soon as she would need it to be. I was discussing her options and was giving the provider line phone # to assist her further. Patient then disconnected the call.

## 2024-02-23 NOTE — TELEPHONE ENCOUNTER
Called and let patient know that insurance denied [employer] wanting patient to complete 6 full months of diet and exercise before they will consider covering. Patient verbalized understanding.

## 2024-02-26 ENCOUNTER — TREATMENT (OUTPATIENT)
Age: 44
End: 2024-02-26
Payer: COMMERCIAL

## 2024-02-26 ENCOUNTER — PATIENT MESSAGE (OUTPATIENT)
Dept: ORTHOPEDIC SURGERY | Age: 44
End: 2024-02-26

## 2024-02-26 DIAGNOSIS — M25.571 CHRONIC PAIN OF RIGHT ANKLE: ICD-10-CM

## 2024-02-26 DIAGNOSIS — M62.81 MUSCLE WEAKNESS: Primary | ICD-10-CM

## 2024-02-26 DIAGNOSIS — G89.29 CHRONIC PAIN OF RIGHT ANKLE: ICD-10-CM

## 2024-02-26 PROCEDURE — 97140 MANUAL THERAPY 1/> REGIONS: CPT | Performed by: PHYSICAL THERAPIST

## 2024-02-26 PROCEDURE — 97110 THERAPEUTIC EXERCISES: CPT | Performed by: PHYSICAL THERAPIST

## 2024-02-26 NOTE — TELEPHONE ENCOUNTER
From: Gaby Dinh  To: Jinny Leon  Sent: 2/26/2024 8:47 AM EST  Subject: Appointment Request    Appointment Request From: Gaby Dinh    With Provider: HARMONY Fry CNP [[facility]]    Preferred Date Range: 2/26/2024 – 3/8/2024    Preferred Times: Any Time    Reason for visit: Request an Appointment    Comments:  Hand pain still after RA infusions

## 2024-02-26 NOTE — PROGRESS NOTES
[facility]  [address] [place] [id number]  Dept: [phone]      Physical Therapy Daily Note     Referring MD: Haroon Soto MD  Diagnosis:     ICD-10-CM    1. Muscle weakness  M62.81       2. Chronic pain of right ankle  M25.571     G89.29          Therapy precautions:None  Co-morbidities affecting plan of care: RA, chronic condition post op w/ neuralgia and hypersensitivity  Total Time: 45 min, Timed Procedure Codes: 45 min  Modifier needed: No  Visit count: 3     Chief complaints/history of injury:    Date symptoms began: 1/2022  Nature of condition: Chronic (continuous duration > 3 months)  Primary cause of current episode: Post-surgical  How did symptoms start: Pt reports after ankle reconstructive sx she had delayed PT and a general delayed rehab and pain.   She never was able to have supervised rehab b/c she lost her position at her job thus her insurance.  She has noticed w/ calf raises, jogging, and when cold outside she has inc pain in her ankle.  Her pain is in lateral ankle generally w/ stabbing into malleolus at times, clicking in achilles/posterior ankle w/ motion and then when jogging TCJ and into lateral ankle.  She has sensitivity around incisions as well  She feels clicking when moving her ankle.  She did start Lyrica that she just started.  She does have a walking boot that she will wear when walking for extended time.  She has recently been having flare up of RA and is seeking tx for this.    Patient Stated Goals: improve ability to raise onto toes, be able to do some jogging progressions      SUBJECTIVE     Pt reports she feels overall improvement w/ pain reduced but still has burning in outside of her ankle/leg    Medications: no changes since last session      OBJECTIVE      Functional Outcome Questionnaire:   Lower Extremity Functional Scale: 53/80= 66% function     A/PROM Measures:  2/15/24   Right Left Comment         Ankle PF 45     Ankle

## 2024-02-28 ENCOUNTER — TREATMENT (OUTPATIENT)
Age: 44
End: 2024-02-28
Payer: COMMERCIAL

## 2024-02-28 DIAGNOSIS — G89.29 CHRONIC PAIN OF RIGHT ANKLE: ICD-10-CM

## 2024-02-28 DIAGNOSIS — M25.571 CHRONIC PAIN OF RIGHT ANKLE: ICD-10-CM

## 2024-02-28 DIAGNOSIS — M62.81 MUSCLE WEAKNESS: Primary | ICD-10-CM

## 2024-02-28 PROCEDURE — 97110 THERAPEUTIC EXERCISES: CPT | Performed by: PHYSICAL THERAPIST

## 2024-02-28 PROCEDURE — 97140 MANUAL THERAPY 1/> REGIONS: CPT | Performed by: PHYSICAL THERAPIST

## 2024-02-28 NOTE — PROGRESS NOTES
[facility] - [facility]  [address] [place] [id number]  Dept: [phone]      Physical Therapy Daily Note     Referring MD: Haroon Soto MD  Diagnosis:     ICD-10-CM    1. Muscle weakness  M62.81       2. Chronic pain of right ankle  M25.571     G89.29          Therapy precautions:None  Co-morbidities affecting plan of care: RA, chronic condition post op w/ neuralgia and hypersensitivity  Total Time: 45 min, Timed Procedure Codes: 45 min  Modifier needed: No  Visit count: 4     Chief complaints/history of injury:    Date symptoms began: 1/2022  Nature of condition: Chronic (continuous duration > 3 months)  Primary cause of current episode: Post-surgical  How did symptoms start: Pt reports after ankle reconstructive sx she had delayed PT and a general delayed rehab and pain.   She never was able to have supervised rehab b/c she lost her position at her job thus her insurance.  She has noticed w/ calf raises, jogging, and when cold outside she has inc pain in her ankle.  Her pain is in lateral ankle generally w/ stabbing into malleolus at times, clicking in achilles/posterior ankle w/ motion and then when jogging TCJ and into lateral ankle.  She has sensitivity around incisions as well  She feels clicking when moving her ankle.  She did start Lyrica that she just started.  She does have a walking boot that she will wear when walking for extended time.  She has recently been having flare up of RA and is seeking tx for this.    Patient Stated Goals: improve ability to raise onto toes, be able to do some jogging progressions      SUBJECTIVE     Pt reports she has had more cramping in which her foot is moving into eversion w/out her actually attempting to do this.  She does report that she feels like her activity flory is improving    Medications: no changes since last session      OBJECTIVE      Functional Outcome Questionnaire:   Lower Extremity Functional Scale: 53/80= 66% function

## 2024-03-05 ENCOUNTER — HOSPITAL ENCOUNTER (OUTPATIENT)
Dept: MAMMOGRAPHY | Age: 44
Discharge: HOME | End: 2024-03-08
Attending: OBSTETRICS & GYNECOLOGY
Payer: COMMERCIAL

## 2024-03-05 DIAGNOSIS — Z12.31 SCREENING MAMMOGRAM, ENCOUNTER FOR: ICD-10-CM

## 2024-03-05 PROCEDURE — 77063 BREAST TOMOSYNTHESIS BI: CPT

## 2024-03-06 ENCOUNTER — CLINICAL DOCUMENTATION (OUTPATIENT)
Age: 44
End: 2024-03-06

## 2024-03-06 NOTE — PROGRESS NOTES
Therapy dept called and ming a message for pt to change appointment time from 1:45 to 2:00 today due to PT out sick. Pt returned call and left vm to cancel appointment stating she is unable to attend PT at that time.

## 2024-03-07 ENCOUNTER — OFFICE VISIT (OUTPATIENT)
Dept: ORTHOPEDIC SURGERY | Age: 44
End: 2024-03-07
Payer: COMMERCIAL

## 2024-03-07 DIAGNOSIS — G56.22 CUBITAL TUNNEL SYNDROME, LEFT: ICD-10-CM

## 2024-03-07 DIAGNOSIS — G56.02 LEFT CARPAL TUNNEL SYNDROME: ICD-10-CM

## 2024-03-07 DIAGNOSIS — G56.01 RIGHT CARPAL TUNNEL SYNDROME: Primary | ICD-10-CM

## 2024-03-07 DIAGNOSIS — G56.21 CUBITAL TUNNEL SYNDROME, RIGHT: ICD-10-CM

## 2024-03-07 PROCEDURE — 99214 OFFICE O/P EST MOD 30 MIN: CPT | Performed by: NURSE PRACTITIONER

## 2024-03-07 NOTE — PROGRESS NOTES
Patient was fitted and instructed on an  Wrist Splint for patients bilateral wrist. Patient is aware the hand slides in the brace with the thumb placed threw the thumb hole. I demonstrated the correct way to tighten the brace straps to allow for a comfortable fit. Patient understood the correct way to wear the brace.    Patient read and signed documenting they understand and agree to [facility]'s current DME return policy.

## 2024-03-07 NOTE — PROGRESS NOTES
Orthopaedic Hand Clinic Note    Name: Gaby Dinh  YOB: 1980  Gender: female  MRN: [id number]      Follow up visit:   1. Right carpal tunnel syndrome    2. Cubital tunnel syndrome, right    3. Cubital tunnel syndrome, left    4. Left carpal tunnel syndrome        HPI: Gaby Dinh is a 43 y.o. female who is following up for bilateral hand pain.  She reports she saw Dr. Sal.  She was told that her hand and wrist pain is not related to the rheumatoid arthritis.  She reports sharp pains that run from her wrist through her fingers.  She says the right is getting worse.  She said the left is started to bother her more than the past.  She is wearing her left wrist brace.  She is on infusions for her rheumatoid arthritis.    ROS/Meds/PSH/PMH/FH/SH: I personally reviewed the patients standard intake form.  Pertinents are discussed in the HPI    Physical Examination:    Musculoskeletal Examination:  Examination on the bilateral upper extremity demonstrates cap refill < 5 seconds in all fingers  Cervical spine has normal range of motion without tenderness to palpation, negative Spurling's test. Shoulders and elbows have normal pain free range of motion.    Examination of the bilateral upper extremity demonstrates Decreased sensation to light touch in the median distribution, decreased sensation in ulnar distribution, positive carpal tunnel compression testing and Phalen testing, cap refill < 5 seconds in all fingers. Inspection reveals no thenar atrophy.  Positive Tinel and elbow flexion compression test of the cubital tunnel, negative Tinel over Guyon's canal. Sensation to light touch in the ulnar 2 digits is decreased with no intrinsic atrophy/weakness. No tenderness to palpation or masses noted in the forearm.    Imaging / Electrodiagnostic Tests:     The EMGs from Dr. Ana Winkler from May 2022 are reviewed.  Patient was diagnosed with grade 2, mild right carpal tunnel and

## 2024-03-13 ENCOUNTER — TELEPHONE (OUTPATIENT)
Age: 44
End: 2024-03-13

## 2024-03-13 NOTE — TELEPHONE ENCOUNTER
Pt did not show for their scheduled therapy appointment today.    Reason: unknown  Communication: Left a voicemail to have pt call to r/s; she also was a no show for appt on 3/11.  She has no other appointments scheduled at this time

## 2024-03-15 ENCOUNTER — TELEPHONE (OUTPATIENT)
Dept: ORTHOPEDIC SURGERY | Age: 44
End: 2024-03-15

## 2024-03-15 NOTE — TELEPHONE ENCOUNTER
Please call patient, was to come in for a 2wk fu today, daughter had wisdom teeth taken out yesteday and was up all night,  call to r/s please didn't see anything til april

## 2024-03-21 ENCOUNTER — OFFICE VISIT (OUTPATIENT)
Dept: SURGERY | Age: 44
End: 2024-03-21
Payer: COMMERCIAL

## 2024-03-21 VITALS
SYSTOLIC BLOOD PRESSURE: 126 MMHG | HEIGHT: 63 IN | WEIGHT: 206 LBS | HEART RATE: 70 BPM | DIASTOLIC BLOOD PRESSURE: 76 MMHG | BODY MASS INDEX: 36.5 KG/M2

## 2024-03-21 DIAGNOSIS — Z71.3 NUTRITIONAL COUNSELING: ICD-10-CM

## 2024-03-21 DIAGNOSIS — K21.9 GASTROESOPHAGEAL REFLUX DISEASE, UNSPECIFIED WHETHER ESOPHAGITIS PRESENT: ICD-10-CM

## 2024-03-21 DIAGNOSIS — M05.9 RHEUMATOID ARTHRITIS WITH POSITIVE RHEUMATOID FACTOR, INVOLVING UNSPECIFIED SITE (HCC): ICD-10-CM

## 2024-03-21 DIAGNOSIS — E66.01 CLASS 3 SEVERE OBESITY DUE TO EXCESS CALORIES WITH SERIOUS COMORBIDITY AND BODY MASS INDEX (BMI) OF 40.0 TO 44.9 IN ADULT (HCC): Primary | ICD-10-CM

## 2024-03-21 PROCEDURE — 99215 OFFICE O/P EST HI 40 MIN: CPT | Performed by: SURGERY

## 2024-03-21 NOTE — PROGRESS NOTES
Emi Otero MD   Bariatric & Advanced Laparoscopic Surgery & Endoscopy  [address] [facility], [address]  [place]  [address]                             Office [phone] Fax [phone]        Date of visit: 3/21/2024      History and Physical    Patient: Gaby Dinh MRN: [id number]     YOB: 1980  Age: 43 y.o.  Sex: female              PCP: Sheba Pcp   Pharmacy:   [facility] [address] [place] [address] - P [phone] - F [phone]  [address] [place] [address]  Phone: [phone] Fax: [phone]     Date:  3/21/2024        2 month(s) post medication start  She has lost,  4 lbs. since her last office visit.    How is patient taking medication? Metformin 1000 mg BID       She reports Orlistat makes her stomach feel heavy. She reports having less appetite.    Clinical Assessment and Physical Exam:    she is doing very well today and is feeling good. She reports that she has been adhering to the protein first diet without difficulty.  She denies any tachycardia, abdominal pain, nausea or vomiting.  She does report having problems with constipation.     Protein:  80 grams per day  Fluids:    64+ ounces per day  Physical Activity:  walking and stairs 1-3 hrs 3xwk    Initial Consult Date 10/25/2023   Initial Weight 245   Ideal Body Weight 143   Initial BMI 42.72   Initial Neck Circumference 15   Initial Waist Circumference 48.5   Initial Hip Circumference 51   Initial BF% 50.6       Medication See history below   Medication Start Date 1/9/2024       Today's Weight 93.4 kg (206 lb)   Today's BMI Body mass index is 36.49 kg/m².    Today's Neck Circumference 15   Today's Waist 43   Today's Hip 49   Today's BF% 49       Evaluation of Co-morbid Conditions  Sleep Apnea No, uses CPAP- NA   Diabetes No, insulin dependent- NA   Hypertension No, number of medications- 0   GERD Yes, treatment medication- Pantoprazole   Hyperlipidemia

## 2024-03-21 NOTE — PROGRESS NOTES
[facility] MEDICINE NUTRITION REASSESSMENT     Assessment:    Pt reports good dietary compliance since last office visit. Pt is eating at least 3x/d. Pt is drinking water and tea. Pt is exercising via wts and stair master for 1-3 hrs 3 x weekly.   Diet Recall:              Breakfast: Eggs, protein pancake, penn              Lunch: Rice, broccoli, cabbage, chicken              Dinner: Greek yogurt and pineapple     Intervention:   Evaluated diet recall and identified modifications.  Encouraged pt to be mindful of high fat meats such as penn  Encouraged continued activity.          Monitoring and Evaluation:  Monitor for continued safe, supervised weight loss for OM.    F/U per MD Nadine Simpson MBA, Nutritionist, RD eligible

## 2024-03-28 ENCOUNTER — OFFICE VISIT (OUTPATIENT)
Dept: NEUROLOGY | Age: 44
End: 2024-03-28
Payer: COMMERCIAL

## 2024-03-28 VITALS
SYSTOLIC BLOOD PRESSURE: 113 MMHG | BODY MASS INDEX: 37.46 KG/M2 | DIASTOLIC BLOOD PRESSURE: 74 MMHG | OXYGEN SATURATION: 98 % | HEART RATE: 83 BPM | WEIGHT: 211.4 LBS | HEIGHT: 63 IN

## 2024-03-28 DIAGNOSIS — G50.0 TRIGEMINAL NEURALGIA OF RIGHT SIDE OF FACE: ICD-10-CM

## 2024-03-28 DIAGNOSIS — G43.109 MIGRAINE WITH AURA AND WITHOUT STATUS MIGRAINOSUS, NOT INTRACTABLE: Primary | ICD-10-CM

## 2024-03-28 PROCEDURE — 99205 OFFICE O/P NEW HI 60 MIN: CPT | Performed by: NURSE PRACTITIONER

## 2024-03-28 RX ORDER — RIMEGEPANT SULFATE 75 MG/75MG
75 TABLET, ORALLY DISINTEGRATING ORAL
Qty: 9 TABLET | Refills: 3 | Status: SHIPPED | OUTPATIENT
Start: 2024-03-28 | End: 2024-03-28

## 2024-03-28 RX ORDER — FREMANEZUMAB-VFRM 225 MG/1.5ML
225 INJECTION SUBCUTANEOUS
Qty: 4.5 ML | Refills: 0 | Status: SHIPPED | OUTPATIENT
Start: 2024-03-28 | End: 2024-04-27

## 2024-03-28 RX ORDER — QUETIAPINE FUMARATE 25 MG/1
50 TABLET, FILM COATED ORAL 2 TIMES DAILY
COMMUNITY

## 2024-03-28 ASSESSMENT — PATIENT HEALTH QUESTIONNAIRE - PHQ9
1. LITTLE INTEREST OR PLEASURE IN DOING THINGS: SEVERAL DAYS
2. FEELING DOWN, DEPRESSED OR HOPELESS: SEVERAL DAYS
SUM OF ALL RESPONSES TO PHQ QUESTIONS 1-9: 2
SUM OF ALL RESPONSES TO PHQ9 QUESTIONS 1 & 2: 2

## 2024-03-28 ASSESSMENT — ENCOUNTER SYMPTOMS
VOMITING: 1
NAUSEA: 1
PHOTOPHOBIA: 1

## 2024-03-28 NOTE — ASSESSMENT & PLAN NOTE
Patient with trials and failures of multiple first-line medications.  First dose of Ajovy administered in office.  She will continue this on an outpatient basis.  Patient teaching provided.    Nurtec at onset of migraine.  Avoid triptans due to history of aura.  Discussed that Nurtec may also be of value for her onset of her facial pain.

## 2024-03-28 NOTE — ASSESSMENT & PLAN NOTE
Right trigeminal neuralgia.  MRI brain with special attention to right trigeminal nerve.      She will obtain outside records with previous workup regarding her trigeminal neuralgia and previous MRI.  She is unsure where these were performed and we will check.  Glad to have her fill out a medical release form or patient can obtain herself and bring to her next office visit.    Discussed she can take 1 Ativan 30 minutes prior to her MRI.  She has this at home.  She will need a .

## 2024-03-28 NOTE — PROGRESS NOTES
Gaby Dinh is a 43 y.o. female who presents with headaches.    CC: Headache        Referred by   Rashad Fox MD        HPI:            She reports severe headaches started in 2013, right sided headaches and facial pain desscribed as \"meat valentina\" pain with v1 and v3 pain on right side. She did have an MRI at the time which noted irritation of right TN.  She took a series of multiple medications, oxcarb, gabapentin, lyrica without great resolution. This was mostly being treated for her TN but also had migraines concomitantly. These first began 2013.  The current frequency of headaches: 6-12/month.  Duration: lasting days at a time  Severity is severe. Quality of headaches are described as \"dull, sharp pain\".  Associated symptoms: nausea, vomiting, \"sparkles\", photophobia, phonophobia, watery eyes on ipsilatearl and feels like her head is warmer on the side of the pain.  The headaches are exacerbated by stress, sleep deprivation.  Factors that relieve the headaches are she is not taking anything, tylenol ineffective. Can't take aleve due to hydrochoroquine or simponi and also takes diclofneac. Recently in ED for intractable headache      Intermittent. Facial pain right v1/v2 facial pain. Triggers are cool breezes, she reports tingling, decreased facial sensation infraorbital on right noticed in the last 30 days. No difficulty talking, smiling, chewing.         She also reports an episode of right eye blindness for 2.5 months in 2015, she was told that her pressure in her eye was 45. Denies history of glaucoma or psedutumor? She reports having a headache right retro-orbital pain and right temple pain that occurred during the episode of blindness. Received IV steroids for 5 days, symptoms returned a few days later, and had to return for additional steroids. She did have a repeat MRI at the time but doesn't know what this showed. She denies any episodes of hemibody numbness, negative lhermitte's,

## 2024-04-01 ENCOUNTER — PROCEDURE VISIT (OUTPATIENT)
Dept: NEUROLOGY | Age: 44
End: 2024-04-01
Payer: COMMERCIAL

## 2024-04-01 VITALS — HEIGHT: 63 IN | BODY MASS INDEX: 36.68 KG/M2 | OXYGEN SATURATION: 99 % | HEART RATE: 71 BPM | WEIGHT: 207 LBS

## 2024-04-01 DIAGNOSIS — R20.2 NUMBNESS AND TINGLING IN BOTH HANDS: Primary | ICD-10-CM

## 2024-04-01 DIAGNOSIS — R20.0 NUMBNESS AND TINGLING IN BOTH HANDS: Primary | ICD-10-CM

## 2024-04-01 PROCEDURE — 95885 MUSC TST DONE W/NERV TST LIM: CPT | Performed by: PSYCHIATRY & NEUROLOGY

## 2024-04-01 PROCEDURE — 95913 NRV CNDJ TEST 13/> STUDIES: CPT | Performed by: PSYCHIATRY & NEUROLOGY

## 2024-04-01 NOTE — PROGRESS NOTES
EMG/Nerve Conduction Study Procedure Note  [address]    [address]  [place]  [address]   [phone]      Hx:    Exam:     43 y.o. M female    EMG  uppers paresthesias.    No atrophy.    Referring:   Lola Leon CNP  Technologist: Terrence Aburto  Height: 5 foot 3 inch       Summary             Limited selected needle EMG and muscles as below with CV.       Controlled environmental factors / EMG lab.  Temperature.   NCV : sensory segments:    Abnormal = significant slowing of bilateral median nerve distal SCV with good SNAP.  Normal bilateral ulnar bilateral radial SCV.  NCV transcarpal sensory segments:     Abnormal bilateral slowing of the transcarpal median segments with normal transcarpal ulnar segments and a peak difference of velocity on the right median at 0.62 ms; left median at 0.46 ms (UL = 0.20 ms).  NCV Motor MCV segments:     Normal bilateral median and ulnar MCV.  F-wave studies:         Normal bilateral median bilateral ulnar F-wave latencies.     NEEDLE EMG:   Tested muscles::    Normal bilateral APB equals   Normal insertional activity and interference pattern/recruitment.  No fasciculations fibrillations positive sharp waves.  Normal MUP.  No BSS AP.  No giant MUP.  No myotonia.  No upper motor neuron sign.          INTERPRETATION:    THESE FINDINGS ARE ELECTROPHYSIOLOGIC EVIDENCE OF ENTRAPMENT OF THE MEDIAN NERVES AT THE WRIST BILATERALLY WITHOUT AXONAL DENERVATION.  NO OTHER NEUROPATHY IDENTIFIED.             CONCLUSION:      Compatible with early carpal tunnel syndrome bilaterally.      Procedure Details:       Correlates with early bilateral carpal tunnel syndromes.    Patient made aware.              Please Note::     Data and waveforms * filed under Procedure category [facility].    See Procedure Files for complete data pages.       [ *NOTE:  parts or all of this consultation are produced using artificial voice recognition software.  Some speech errors are inherent in such software

## 2024-04-12 ENCOUNTER — HOSPITAL ENCOUNTER (OUTPATIENT)
Dept: MRI IMAGING | Age: 44
Discharge: HOME | End: 2024-04-12
Payer: COMMERCIAL

## 2024-04-12 DIAGNOSIS — G50.0 TRIGEMINAL NEURALGIA OF RIGHT SIDE OF FACE: ICD-10-CM

## 2024-04-12 DIAGNOSIS — G43.109 MIGRAINE WITH AURA AND WITHOUT STATUS MIGRAINOSUS, NOT INTRACTABLE: ICD-10-CM

## 2024-04-12 PROCEDURE — A9579 GAD-BASE MR CONTRAST NOS,1ML: HCPCS | Performed by: NURSE PRACTITIONER

## 2024-04-12 PROCEDURE — 2580000003 HC RX 258: Performed by: NURSE PRACTITIONER

## 2024-04-12 PROCEDURE — 70553 MRI BRAIN STEM W/O & W/DYE: CPT

## 2024-04-12 PROCEDURE — 6360000004 HC RX CONTRAST MEDICATION: Performed by: NURSE PRACTITIONER

## 2024-04-12 RX ORDER — SODIUM CHLORIDE 0.9 % (FLUSH) 0.9 %
10 SYRINGE (ML) INJECTION AS NEEDED
Status: DISCONTINUED | OUTPATIENT
Start: 2024-04-12 | End: 2024-04-16 | Stop reason: HOSPADM

## 2024-04-12 RX ADMIN — GADOTERIDOL 18 ML: 279.3 INJECTION, SOLUTION INTRAVENOUS at 21:05

## 2024-04-12 RX ADMIN — SODIUM CHLORIDE, PRESERVATIVE FREE 10 ML: 5 INJECTION INTRAVENOUS at 21:05

## 2024-04-17 ENCOUNTER — PATIENT MESSAGE (OUTPATIENT)
Dept: ORTHOPEDIC SURGERY | Age: 44
End: 2024-04-17

## 2024-04-17 NOTE — TELEPHONE ENCOUNTER
From: Gaby Dinh  To: Dr. Haroon Deras  Sent: 4/16/2024 7:36 AM EDT  Subject: Appointment Request    Appointment Request From: Gaby Dinh    With Provider: Haroon Deras MD [facility]]    Preferred Date Range: 4/17/2024 – 5/3/2024    Preferred Times: Any Time    Reason for visit: Request an Appointment    Comments:  Reschedule, my Rhuematoid Arthritis is inflamed please cancel today's

## 2024-04-18 ENCOUNTER — OFFICE VISIT (OUTPATIENT)
Dept: ORTHOPEDIC SURGERY | Age: 44
End: 2024-04-18
Payer: COMMERCIAL

## 2024-04-18 ENCOUNTER — OFFICE VISIT (OUTPATIENT)
Dept: SURGERY | Age: 44
End: 2024-04-18
Payer: COMMERCIAL

## 2024-04-18 ENCOUNTER — CLINICAL DOCUMENTATION (OUTPATIENT)
Dept: SURGERY | Age: 44
End: 2024-04-18

## 2024-04-18 VITALS
WEIGHT: 206 LBS | HEART RATE: 64 BPM | DIASTOLIC BLOOD PRESSURE: 65 MMHG | SYSTOLIC BLOOD PRESSURE: 116 MMHG | HEIGHT: 64 IN | BODY MASS INDEX: 35.17 KG/M2

## 2024-04-18 DIAGNOSIS — G56.01 CARPAL TUNNEL SYNDROME OF RIGHT WRIST: ICD-10-CM

## 2024-04-18 DIAGNOSIS — G56.01 RIGHT CARPAL TUNNEL SYNDROME: Primary | ICD-10-CM

## 2024-04-18 DIAGNOSIS — K21.9 GASTROESOPHAGEAL REFLUX DISEASE, UNSPECIFIED WHETHER ESOPHAGITIS PRESENT: ICD-10-CM

## 2024-04-18 DIAGNOSIS — G56.02 LEFT CARPAL TUNNEL SYNDROME: ICD-10-CM

## 2024-04-18 DIAGNOSIS — E66.01 CLASS 3 SEVERE OBESITY DUE TO EXCESS CALORIES WITH SERIOUS COMORBIDITY AND BODY MASS INDEX (BMI) OF 40.0 TO 44.9 IN ADULT (HCC): ICD-10-CM

## 2024-04-18 DIAGNOSIS — M05.9 RHEUMATOID ARTHRITIS WITH POSITIVE RHEUMATOID FACTOR, INVOLVING UNSPECIFIED SITE (HCC): Primary | ICD-10-CM

## 2024-04-18 DIAGNOSIS — E65 ABDOMINAL OBESITY: ICD-10-CM

## 2024-04-18 DIAGNOSIS — Z71.3 NUTRITIONAL COUNSELING: ICD-10-CM

## 2024-04-18 PROCEDURE — 99214 OFFICE O/P EST MOD 30 MIN: CPT | Performed by: NURSE PRACTITIONER

## 2024-04-18 PROCEDURE — 99215 OFFICE O/P EST HI 40 MIN: CPT | Performed by: SURGERY

## 2024-04-18 RX ORDER — SEMAGLUTIDE 0.25 MG/.5ML
0.25 INJECTION, SOLUTION SUBCUTANEOUS
Qty: 2 ML | Refills: 0 | Status: SHIPPED | OUTPATIENT
Start: 2024-04-18 | End: 2024-05-18

## 2024-04-18 NOTE — PROGRESS NOTES
[facility] MEDICINE NUTRITION REASSESSMENT     Assessment:    Pt reports good dietary compliance since last office visit. Pt is eating at least 3x/d. Pt is drinking water, coffee, and grapefruit juice. Pt is exercising via walking for 45min - 1 hr 4 x weekly - reports joint pain.   Diet Recall:              Breakfast: Almonds and prunes              Lunch: Salad with turkey   Snack: Chicken sausage and sweet potato              Dinner: Raisin bran cereal   Snack: Brayan beef jennifer      Intervention:   Evaluated diet recall and identified modifications.  Encouraged pt to eliminate high sugared cereals such as raisin bran   Encouraged continued and increased activity - as able         Monitoring and Evaluation:  Monitor for continued safe, supervised weight loss for OM.    F/U per MD Nadine Simpson MBA, Nutritionist, RD eligible

## 2024-04-18 NOTE — PROGRESS NOTES
Emi Otero MD   Bariatric & Advanced Laparoscopic Surgery & Endoscopy  [address] [facility], [address]  [place]  [address]                             Office [phone] Fax [phone]        Date of visit: 4/18/2024      History and Physical    Patient: Gaby Dinh MRN: [id number]     YOB: 1980  Age: 43 y.o.  Sex: female              PCP: Sheba Pcp   Pharmacy:   [facility] [address] [place] [address] - P [phone] - F [phone]  [address] [place] [address]  Phone: [phone] Fax: [phone]     Date:  4/18/2024        3 month(s) post medication start  She has there was no change in patient's weight. since her last office visit.    How is patient taking medication? Metformin 1000 mg BID       She stopped Orlistat because it makes her stomach feel heavy. She reports having more appetite.    Clinical Assessment and Physical Exam:    she is doing very well today and is feeling good. She reports that she has been adhering to the protein first diet without difficulty.  She denies any tachycardia, abdominal pain, nausea or vomiting.  She does report having problems with constipation.     Protein:  60 grams per day  Fluids:    50 ounces per day  Physical Activity:  walking 45-60 min 4x/wk    Initial Consult Date 10/25/2023   Initial Weight 245   Ideal Body Weight 143   Initial BMI 42.72   Initial Neck Circumference 15   Initial Waist Circumference 48.5   Initial Hip Circumference 51   Initial BF% 50.6       Medication See history below   Medication Start Date 1/9/2024       Today's Weight 93.4 kg (206 lb)   Today's BMI Body mass index is 35.92 kg/m².    Today's Neck Circumference 13.5   Today's Waist 42   Today's Hip 44   Today's BF% 47.9       Evaluation of Co-morbid Conditions  Sleep Apnea No, uses CPAP- NA   Diabetes No, insulin dependent- NA   Hypertension No, number of medications- 0   GERD Yes, treatment

## 2024-04-18 NOTE — PROGRESS NOTES
has bilateral mild carpal tunnel syndrome.    Assessment:     ICD-10-CM    1. Right carpal tunnel syndrome  G56.01       2. Left carpal tunnel syndrome  G56.02           Plan:  We discussed the diagnosis and different treatment options. We discussed observation, EMG/NCV, night splinting, cortisone injections and surgical decompression and the risks and benefits of all were clearly outlined. After discussing in detail the patient elects to proceed with surgical intervention.  Risk and benefits were addressed in detail with the patient.  She understands and wishes to proceed.  We did discuss that she has tried bracing and injections.  She reports no relief from her carpal tunnel injection.  I told her that concerns me proceeding with surgery.  I explained to her that surgery may not help or alleviate her symptoms.  She understands completely.  I have offered a second opinion.  She politely declines.  She wants to proceed with the right side first since it bothers her the most.  We discussed her postoperative recovery including her restrictions.  She and I had a long discussion about her underlying condition in addition to mild carpal tunnel syndrome..     Patient voiced accordance and understanding of the information provided and the formulated plan. All questions were answered to the patient's satisfaction during the encounter.    4 This is a chronic illness with exacerbation, progression, or side effect of treatment  Treatment at this time:  Surgery    HARMONY Fry - CNP  Orthopaedic Surgery  04/18/24  8:56 AM

## 2024-04-19 ENCOUNTER — TELEPHONE (OUTPATIENT)
Dept: ORTHOPEDIC SURGERY | Age: 44
End: 2024-04-19

## 2024-04-19 ENCOUNTER — TELEPHONE (OUTPATIENT)
Dept: NEUROLOGY | Age: 44
End: 2024-04-19

## 2024-04-19 RX ORDER — FREMANEZUMAB-VFRM 225 MG/1.5ML
225 INJECTION SUBCUTANEOUS
Qty: 4.5 ML | Refills: 0 | Status: SHIPPED | OUTPATIENT
Start: 2024-04-19 | End: 2024-05-19

## 2024-05-13 ENCOUNTER — TELEPHONE (OUTPATIENT)
Dept: ORTHOPEDIC SURGERY | Age: 44
End: 2024-05-13

## 2024-05-13 NOTE — TELEPHONE ENCOUNTER
Patient would like to push her surgery back until June. Patient is having family issues and has to go back to [place]. Pt also wants to schedule her surgery for her other hand. Please call

## 2024-05-16 ENCOUNTER — OFFICE VISIT (OUTPATIENT)
Dept: SURGERY | Age: 44
End: 2024-05-16
Payer: COMMERCIAL

## 2024-05-16 VITALS
HEIGHT: 64 IN | HEART RATE: 75 BPM | DIASTOLIC BLOOD PRESSURE: 78 MMHG | BODY MASS INDEX: 33.97 KG/M2 | WEIGHT: 199 LBS | SYSTOLIC BLOOD PRESSURE: 135 MMHG

## 2024-05-16 DIAGNOSIS — Z71.3 NUTRITIONAL COUNSELING: ICD-10-CM

## 2024-05-16 DIAGNOSIS — E66.01 CLASS 3 SEVERE OBESITY DUE TO EXCESS CALORIES WITH SERIOUS COMORBIDITY AND BODY MASS INDEX (BMI) OF 40.0 TO 44.9 IN ADULT (HCC): ICD-10-CM

## 2024-05-16 DIAGNOSIS — K21.9 GASTROESOPHAGEAL REFLUX DISEASE, UNSPECIFIED WHETHER ESOPHAGITIS PRESENT: Primary | ICD-10-CM

## 2024-05-16 DIAGNOSIS — E65 ABDOMINAL OBESITY: ICD-10-CM

## 2024-05-16 PROCEDURE — 99215 OFFICE O/P EST HI 40 MIN: CPT | Performed by: SURGERY

## 2024-05-16 RX ORDER — SEMAGLUTIDE 0.5 MG/.5ML
0.5 INJECTION, SOLUTION SUBCUTANEOUS
Qty: 2 ML | Refills: 0 | Status: SHIPPED | OUTPATIENT
Start: 2024-05-16 | End: 2024-06-15

## 2024-05-16 NOTE — PROGRESS NOTES
Emi Otero MD   Bariatric & Advanced Laparoscopic Surgery & Endoscopy  [address] [facility], [address]  [place]  [address]                             Office [phone] Fax [phone]        Date of visit: 5/16/2024      History and Physical    Patient: Gaby Dinh MRN: [id number]     YOB: 1980  Age: 43 y.o.  Sex: female              PCP: Sheba Pcp   Pharmacy:   [facility] [address] [place] [address] - P [phone] - F [phone]  [address] [place] [address]  Phone: [phone] Fax: [phone]     Date:  5/16/2024        4.5 month(s) post medication start  She has lost,  7 lbs. since her last office visit.    How is patient taking medication? Metformin 1000 mg BID, Wegovy 0.25 mg      She stopped Orlistat because it makes her stomach feel heavy. She reports having more appetite.    Clinical Assessment and Physical Exam:    she is doing very well today and is feeling good. She reports that she has been adhering to the protein first diet without difficulty.  She denies any tachycardia, abdominal pain, nausea or vomiting.  She does report having problems with constipation.     Protein:  60-90 grams per day  Fluids:    64 ounces per day  Physical Activity:  walking 45-60 min 4x/wk    Initial Consult Date 10/25/2023   Initial Weight 245   Ideal Body Weight 143   Initial BMI 42.72   Initial Neck Circumference 15   Initial Waist Circumference 48.5   Initial Hip Circumference 51   Initial BF% 50.6       Medication See history below   Medication Start Date 1/9/2024       Today's Weight 90.3 kg (199 lb)   Today's BMI Body mass index is 34.7 kg/m².    Today's Neck Circumference 13.5   Today's Waist 42   Today's Hip 45   Today's BF% 45.7       Evaluation of Co-morbid Conditions  Sleep Apnea No, uses CPAP- NA   Diabetes No, insulin dependent- NA   Hypertension No, number of medications- 0   GERD Yes, treatment medication-

## 2024-05-16 NOTE — PROGRESS NOTES
OBESITY MEDICINE NUTRITION REASSESSMENT     Assessment:    Pt reports good dietary compliance since last office visit. Pt is eating at least 3x/d. Pt is drinking water, espresso, mushroom energy drink. Pt is exercising via walking 30-45min 3 x weekly - pt reports recently joining [employer] and plans to do arthritis swim classes.   Diet Recall:              Breakfast: Skinless BBQ chicken, baked beans, kae              Lunch: Eggs, tomato, chicken sausage              Dinner: Applesauce and salt and vinegar chips      Intervention:   Evaluated diet recall and identified modifications.  Encouraged continued consumption of lean protein with each meal  Encouraged continued and increased activity.          Monitoring and Evaluation:  Monitor for continued safe, supervised weight loss for OM.    F/U per MD Nadine Simpson, RD, RENNY

## 2024-05-23 DIAGNOSIS — G56.01 RIGHT CARPAL TUNNEL SYNDROME: Primary | ICD-10-CM

## 2024-05-29 ENCOUNTER — TELEPHONE (OUTPATIENT)
Dept: ORTHOPEDIC SURGERY | Age: 44
End: 2024-05-29

## 2024-06-03 DIAGNOSIS — G56.02 LEFT CARPAL TUNNEL SYNDROME: Primary | ICD-10-CM

## 2024-06-04 RX ORDER — RIMEGEPANT SULFATE 75 MG/75MG
1 TABLET, ORALLY DISINTEGRATING ORAL DAILY PRN
COMMUNITY

## 2024-06-04 RX ORDER — FREMANEZUMAB-VFRM 225 MG/1.5ML
INJECTION SUBCUTANEOUS
COMMUNITY

## 2024-06-07 DIAGNOSIS — G56.01 RIGHT CARPAL TUNNEL SYNDROME: Primary | ICD-10-CM

## 2024-06-07 NOTE — PERIOP NOTE
Preop department called to notify patient of arrival time for scheduled procedure. Instructions given to   - Arrive at [facility] Entrance [address].  - Remain NPO after midnight, unless otherwise indicated, including gum, mints, and ice chips.   - Have a responsible adult to drive patient to the hospital, stay during surgery, and patient will need supervision 24 hours after anesthesia.   - Use antibacterial soap in shower the night before surgery and on the morning of surgery.       Was patient contacted: yes-pt  Voicemail left:   Numbers contacted: [phone]   Arrival time: 0630     
pay a deductible or co-pay on the day of your procedure. You can pre-pay by calling [phone] if your surgery is at the [facility] or [phone] if your surgery is at the [facility].  > Do not wear make-up, nail polish, lotions, cologne, perfumes, powders, or oil on skin. Artificial nails are not permitted.

## 2024-06-10 ENCOUNTER — ANESTHESIA (OUTPATIENT)
Dept: SURGERY | Age: 44
End: 2024-06-10
Payer: COMMERCIAL

## 2024-06-10 ENCOUNTER — ANESTHESIA EVENT (OUTPATIENT)
Dept: SURGERY | Age: 44
End: 2024-06-10
Payer: COMMERCIAL

## 2024-06-10 ENCOUNTER — HOSPITAL ENCOUNTER (OUTPATIENT)
Age: 44
Setting detail: OUTPATIENT SURGERY
Discharge: HOME | End: 2024-06-10
Attending: ORTHOPAEDIC SURGERY | Admitting: ORTHOPAEDIC SURGERY
Payer: COMMERCIAL

## 2024-06-10 VITALS
WEIGHT: 197 LBS | TEMPERATURE: 97.8 F | SYSTOLIC BLOOD PRESSURE: 100 MMHG | DIASTOLIC BLOOD PRESSURE: 66 MMHG | HEIGHT: 64 IN | BODY MASS INDEX: 33.63 KG/M2 | HEART RATE: 89 BPM | RESPIRATION RATE: 16 BRPM | OXYGEN SATURATION: 97 %

## 2024-06-10 LAB
GLUCOSE BLD STRIP.AUTO-MCNC: 84 MG/DL (ref 65–100)
SERVICE CMNT-IMP: NORMAL

## 2024-06-10 PROCEDURE — 3700000000 HC ANESTHESIA ATTENDED CARE: Performed by: ORTHOPAEDIC SURGERY

## 2024-06-10 PROCEDURE — 76998 US GUIDE INTRAOP: CPT | Performed by: ORTHOPAEDIC SURGERY

## 2024-06-10 PROCEDURE — 2580000003 HC RX 258: Performed by: NURSE ANESTHETIST, CERTIFIED REGISTERED

## 2024-06-10 PROCEDURE — 2580000003 HC RX 258: Performed by: ANESTHESIOLOGY

## 2024-06-10 PROCEDURE — 6360000002 HC RX W HCPCS: Performed by: NURSE ANESTHETIST, CERTIFIED REGISTERED

## 2024-06-10 PROCEDURE — 82962 GLUCOSE BLOOD TEST: CPT

## 2024-06-10 PROCEDURE — 64721 CARPAL TUNNEL SURGERY: CPT | Performed by: ORTHOPAEDIC SURGERY

## 2024-06-10 PROCEDURE — 6360000002 HC RX W HCPCS: Performed by: ORTHOPAEDIC SURGERY

## 2024-06-10 PROCEDURE — 7100000010 HC PHASE II RECOVERY - FIRST 15 MIN: Performed by: ORTHOPAEDIC SURGERY

## 2024-06-10 PROCEDURE — 3600000012 HC SURGERY LEVEL 2 ADDTL 15MIN: Performed by: ORTHOPAEDIC SURGERY

## 2024-06-10 PROCEDURE — 2709999900 HC NON-CHARGEABLE SUPPLY: Performed by: ORTHOPAEDIC SURGERY

## 2024-06-10 PROCEDURE — 3700000001 HC ADD 15 MINUTES (ANESTHESIA): Performed by: ORTHOPAEDIC SURGERY

## 2024-06-10 PROCEDURE — 7100000000 HC PACU RECOVERY - FIRST 15 MIN: Performed by: ORTHOPAEDIC SURGERY

## 2024-06-10 PROCEDURE — 7100000001 HC PACU RECOVERY - ADDTL 15 MIN: Performed by: ORTHOPAEDIC SURGERY

## 2024-06-10 PROCEDURE — 2500000003 HC RX 250 WO HCPCS: Performed by: NURSE ANESTHETIST, CERTIFIED REGISTERED

## 2024-06-10 PROCEDURE — 6370000000 HC RX 637 (ALT 250 FOR IP): Performed by: ANESTHESIOLOGY

## 2024-06-10 PROCEDURE — 2720000010 HC SURG SUPPLY STERILE: Performed by: ORTHOPAEDIC SURGERY

## 2024-06-10 PROCEDURE — 2500000003 HC RX 250 WO HCPCS: Performed by: ORTHOPAEDIC SURGERY

## 2024-06-10 PROCEDURE — 3600000002 HC SURGERY LEVEL 2 BASE: Performed by: ORTHOPAEDIC SURGERY

## 2024-06-10 PROCEDURE — 7100000011 HC PHASE II RECOVERY - ADDTL 15 MIN: Performed by: ORTHOPAEDIC SURGERY

## 2024-06-10 RX ORDER — NALOXONE HYDROCHLORIDE 0.4 MG/ML
INJECTION, SOLUTION INTRAMUSCULAR; INTRAVENOUS; SUBCUTANEOUS PRN
Status: DISCONTINUED | OUTPATIENT
Start: 2024-06-10 | End: 2024-06-10 | Stop reason: HOSPADM

## 2024-06-10 RX ORDER — MIDAZOLAM HYDROCHLORIDE 1 MG/ML
INJECTION INTRAMUSCULAR; INTRAVENOUS PRN
Status: DISCONTINUED | OUTPATIENT
Start: 2024-06-10 | End: 2024-06-10 | Stop reason: SDUPTHER

## 2024-06-10 RX ORDER — LIDOCAINE HYDROCHLORIDE 10 MG/ML
INJECTION, SOLUTION INFILTRATION; PERINEURAL PRN
Status: DISCONTINUED | OUTPATIENT
Start: 2024-06-10 | End: 2024-06-10 | Stop reason: ALTCHOICE

## 2024-06-10 RX ORDER — MIDAZOLAM HYDROCHLORIDE 2 MG/2ML
2 INJECTION, SOLUTION INTRAMUSCULAR; INTRAVENOUS
Status: DISCONTINUED | OUTPATIENT
Start: 2024-06-10 | End: 2024-06-10 | Stop reason: HOSPADM

## 2024-06-10 RX ORDER — LIDOCAINE HYDROCHLORIDE 20 MG/ML
INJECTION, SOLUTION EPIDURAL; INFILTRATION; INTRACAUDAL; PERINEURAL PRN
Status: DISCONTINUED | OUTPATIENT
Start: 2024-06-10 | End: 2024-06-10 | Stop reason: SDUPTHER

## 2024-06-10 RX ORDER — LIDOCAINE HYDROCHLORIDE 10 MG/ML
1 INJECTION, SOLUTION INFILTRATION; PERINEURAL
Status: DISCONTINUED | OUTPATIENT
Start: 2024-06-10 | End: 2024-06-10 | Stop reason: HOSPADM

## 2024-06-10 RX ORDER — HALOPERIDOL 5 MG/ML
1 INJECTION INTRAMUSCULAR
Status: DISCONTINUED | OUTPATIENT
Start: 2024-06-10 | End: 2024-06-10 | Stop reason: HOSPADM

## 2024-06-10 RX ORDER — FENTANYL CITRATE 50 UG/ML
INJECTION, SOLUTION INTRAMUSCULAR; INTRAVENOUS PRN
Status: DISCONTINUED | OUTPATIENT
Start: 2024-06-10 | End: 2024-06-10 | Stop reason: SDUPTHER

## 2024-06-10 RX ORDER — SODIUM CHLORIDE 0.9 % (FLUSH) 0.9 %
5-40 SYRINGE (ML) INJECTION PRN
Status: DISCONTINUED | OUTPATIENT
Start: 2024-06-10 | End: 2024-06-10 | Stop reason: HOSPADM

## 2024-06-10 RX ORDER — SODIUM CHLORIDE 0.9 % (FLUSH) 0.9 %
5-40 SYRINGE (ML) INJECTION EVERY 12 HOURS SCHEDULED
Status: DISCONTINUED | OUTPATIENT
Start: 2024-06-10 | End: 2024-06-10 | Stop reason: HOSPADM

## 2024-06-10 RX ORDER — ACETAMINOPHEN 500 MG
1000 TABLET ORAL ONCE
Status: COMPLETED | OUTPATIENT
Start: 2024-06-10 | End: 2024-06-10

## 2024-06-10 RX ORDER — BUPIVACAINE HYDROCHLORIDE 2.5 MG/ML
INJECTION, SOLUTION EPIDURAL; INFILTRATION; INTRACAUDAL PRN
Status: DISCONTINUED | OUTPATIENT
Start: 2024-06-10 | End: 2024-06-10 | Stop reason: ALTCHOICE

## 2024-06-10 RX ORDER — PROPOFOL 10 MG/ML
INJECTION, EMULSION INTRAVENOUS PRN
Status: DISCONTINUED | OUTPATIENT
Start: 2024-06-10 | End: 2024-06-10 | Stop reason: SDUPTHER

## 2024-06-10 RX ORDER — SODIUM CHLORIDE, SODIUM LACTATE, POTASSIUM CHLORIDE, CALCIUM CHLORIDE 600; 310; 30; 20 MG/100ML; MG/100ML; MG/100ML; MG/100ML
INJECTION, SOLUTION INTRAVENOUS CONTINUOUS
Status: DISCONTINUED | OUTPATIENT
Start: 2024-06-10 | End: 2024-06-10 | Stop reason: HOSPADM

## 2024-06-10 RX ORDER — IPRATROPIUM BROMIDE AND ALBUTEROL SULFATE 2.5; .5 MG/3ML; MG/3ML
1 SOLUTION RESPIRATORY (INHALATION)
Status: DISCONTINUED | OUTPATIENT
Start: 2024-06-10 | End: 2024-06-10 | Stop reason: HOSPADM

## 2024-06-10 RX ORDER — FENTANYL CITRATE 50 UG/ML
100 INJECTION, SOLUTION INTRAMUSCULAR; INTRAVENOUS
Status: DISCONTINUED | OUTPATIENT
Start: 2024-06-10 | End: 2024-06-10 | Stop reason: HOSPADM

## 2024-06-10 RX ORDER — OXYCODONE HYDROCHLORIDE 5 MG/1
5 TABLET ORAL
Status: DISCONTINUED | OUTPATIENT
Start: 2024-06-10 | End: 2024-06-10 | Stop reason: HOSPADM

## 2024-06-10 RX ORDER — SODIUM CHLORIDE 9 MG/ML
INJECTION, SOLUTION INTRAVENOUS PRN
Status: DISCONTINUED | OUTPATIENT
Start: 2024-06-10 | End: 2024-06-10 | Stop reason: HOSPADM

## 2024-06-10 RX ORDER — SODIUM CHLORIDE, SODIUM LACTATE, POTASSIUM CHLORIDE, CALCIUM CHLORIDE 600; 310; 30; 20 MG/100ML; MG/100ML; MG/100ML; MG/100ML
INJECTION, SOLUTION INTRAVENOUS CONTINUOUS PRN
Status: DISCONTINUED | OUTPATIENT
Start: 2024-06-10 | End: 2024-06-10 | Stop reason: SDUPTHER

## 2024-06-10 RX ORDER — HYDROMORPHONE HYDROCHLORIDE 2 MG/ML
0.5 INJECTION, SOLUTION INTRAMUSCULAR; INTRAVENOUS; SUBCUTANEOUS EVERY 5 MIN PRN
Status: DISCONTINUED | OUTPATIENT
Start: 2024-06-10 | End: 2024-06-10 | Stop reason: HOSPADM

## 2024-06-10 RX ORDER — TRAMADOL HYDROCHLORIDE 50 MG/1
50 TABLET ORAL EVERY 6 HOURS PRN
Qty: 20 TABLET | Refills: 0 | Status: SHIPPED | OUTPATIENT
Start: 2024-06-10 | End: 2024-06-15

## 2024-06-10 RX ORDER — PROCHLORPERAZINE EDISYLATE 5 MG/ML
5 INJECTION INTRAMUSCULAR; INTRAVENOUS
Status: DISCONTINUED | OUTPATIENT
Start: 2024-06-10 | End: 2024-06-10 | Stop reason: HOSPADM

## 2024-06-10 RX ADMIN — ACETAMINOPHEN 1000 MG: 500 TABLET, FILM COATED ORAL at 07:13

## 2024-06-10 RX ADMIN — Medication 2 G: at 08:11

## 2024-06-10 RX ADMIN — LIDOCAINE HYDROCHLORIDE 100 MG: 20 INJECTION, SOLUTION EPIDURAL; INFILTRATION; INTRACAUDAL; PERINEURAL at 08:09

## 2024-06-10 RX ADMIN — SODIUM CHLORIDE, POTASSIUM CHLORIDE, SODIUM LACTATE AND CALCIUM CHLORIDE: 600; 310; 30; 20 INJECTION, SOLUTION INTRAVENOUS at 07:13

## 2024-06-10 RX ADMIN — MIDAZOLAM 2 MG: 1 INJECTION INTRAMUSCULAR; INTRAVENOUS at 07:59

## 2024-06-10 RX ADMIN — FENTANYL CITRATE 50 MCG: 50 INJECTION, SOLUTION INTRAMUSCULAR; INTRAVENOUS at 08:11

## 2024-06-10 RX ADMIN — PROPOFOL 100 MG: 10 INJECTION, EMULSION INTRAVENOUS at 08:09

## 2024-06-10 RX ADMIN — PROPOFOL 250 MCG/KG/MIN: 10 INJECTION, EMULSION INTRAVENOUS at 08:10

## 2024-06-10 RX ADMIN — SODIUM CHLORIDE, SODIUM LACTATE, POTASSIUM CHLORIDE, AND CALCIUM CHLORIDE: 600; 310; 30; 20 INJECTION, SOLUTION INTRAVENOUS at 07:58

## 2024-06-10 ASSESSMENT — PAIN SCALES - GENERAL: PAINLEVEL_OUTOF10: 6

## 2024-06-10 NOTE — DISCHARGE INSTRUCTIONS
current medications to your Primary Care Provider.  *  Please update this list whenever your medications are discontinued, doses are      changed, or new medications (including over-the-counter products) are added.  *  Please carry medication information at all times in case of emergency situations.    These are general instructions for a healthy lifestyle:  No smoking/ No tobacco products/ Avoid exposure to second hand smoke  Surgeon General's Warning:  Quitting smoking now greatly reduces serious risk to your health.  Obesity, smoking, and sedentary lifestyle greatly increases your risk for illness  A healthy diet, regular physical exercise & weight monitoring are important for maintaining a healthy lifestyle    You may be retaining fluid if you have a history of heart failure or if you experience any of the following symptoms:  Weight gain of 3 pounds or more overnight or 5 pounds in a week, increased swelling in our hands or feet or shortness of breath while lying flat in bed.  Please call your doctor as soon as you notice any of these symptoms; do not wait until your next office visit.

## 2024-06-10 NOTE — OP NOTE
Hand Surgery Operative Note      Gaby Dinh   43 y.o.   female   6/10/2024     Pre-op diagnosis: Right Carpal Tunnel syndrome  Post op diagnosis: same      Procedure: Right Ultrasound-Guided Carpal Tunnel release cpt 30605, 86502     Surgeon: Crystal Reyes Jr, MD, PhD      Anesthesia: Local + MAC      Tourniquet time: * No tourniquets in log *      Procedure indications: Patient with radial digit numbness recalcitrant to conservative measures with positive documentation of NCV findings consistent with carpal tunnel syndrome. After Thorough discussion, the patient decided to proceed with surgical management. We discussed in detail surgical risks including scar, pain, bleeding, infection, anesthetic risks, neurovascular injury, need for further surgery,  weakness, stiffness, risk of death and potential risk of other unforseen complication.      Procedure description:      Patient was placed in the supine position and after appropriate time-out and side, site and procedure confirmed.     Using a sterile ultrasound cover and sterile gel, relevant anatomical landmarks were identified, including but not limited to the median nerve, thenar motor branch/recurrent motor branch of the median nerve, palmar cutaneous branch of the median nerve, median and ulnar digital nerves and any visualized communications, ulnar vessels and superficial palmar arch, palmar fascia and distal transverse carpal ligament. A sterile ink marker was used to ronal the borders of the transverse and longitudinal safe zones as well as the incision site in the proximal carpal tunnel region. The safe zones were re-scanned to ensure acceptable anatomy and sonographic visualization.    A regional anesthetic was administered. A 25 gauge needle was used to create a small skin wheel at the incision site using 2 ml of buffered 1% lidocaine plain. Following this, under the guidance of ultrasound local anesthesia was delivered deep and superficial to

## 2024-06-10 NOTE — ANESTHESIA PRE PROCEDURE
Department of Anesthesiology  Preprocedure Note       Name:  Gaby Dinh   Age:  43 y.o.  :  1980                                          MRN:  [id number]         Date:  6/10/2024      Surgeon: Surgeon(s):  Crystal Reyes MD    Procedure: Procedure(s):  CARPAL TUNNEL RELEASE ultra sound guided on the right    Medications prior to admission:   Prior to Admission medications    Medication Sig Start Date End Date Taking? Authorizing Provider   Fremanezumab-vfrm (AJOVY) 225 MG/1.5ML SOSY Inject into the skin every 30 days   Yes Darya Shepherd MD   rimegepant sulfate (NURTEC) 75 MG TBDP Take 1 tablet by mouth daily as needed   Yes Darya Shepherd MD   traMADol (ULTRAM) 50 MG tablet Take 1 tablet by mouth every 6 hours as needed for Pain for up to 5 days. Max Daily Amount: 200 mg 6/10/24 6/15/24  Jinny Leon APRN - CNP   Semaglutide-Weight Management (WEGOVY) 0.5 MG/0.5ML SOAJ SC injection Inject 0.5 mg into the skin every 7 days 5/16/24 6/15/24  Emi Tristan MD   QUEtiapine (SEROQUEL) 25 MG tablet Take 2 tablets by mouth 2 times daily  Patient not taking: Reported on 2024    Darya Shepherd MD   metFORMIN (GLUCOPHAGE) 1000 MG tablet Take 1 tablet by mouth 2 times daily (with meals) 2/22/24 3/23/24  Emi Tristan MD   metFORMIN (GLUCOPHAGE) 1000 MG tablet Take 1 tablet by mouth 2 times daily (with meals) 2/8/24 3/28/24  Emi Tristan MD   golimumab (SIMPONI ARIA) 50 MG/4ML SOLN Infuse intravenously    Darya Shepherd MD   diclofenac sodium (VOLTAREN) 1 % GEL Apply 2 g topically 3 times daily for 28 days 1/15/24 3/28/24  Jinny Leon APRN - CNP   vitamin D (ERGOCALCIFEROL) 1.25 MG (96882 UT) CAPS capsule Take 1 capsule by mouth once a week 1/9/24 3/28/24  Emi Tristan MD   sertraline (ZOLOFT) 100 MG tablet Take 1 tablet by mouth daily  Patient not taking: Reported on 2024    Provider, MD Darya

## 2024-06-10 NOTE — H&P
History and Physical    Subjective:     Gaby Dinh is a 43 y.o. scheduled for right US CTR.    Past Medical History:   Diagnosis Date    Abnormal Papanicolaou smear of cervix 2004    repeat normal    Adverse effect of anesthesia     when fallopian tubes removed 2017 was told she woke up the wrong way and had to be re-sedated    Anemia     Anxiety     Arthritis     Depression     managed with meds    Difficulty walking 2024    I stumble light dizziness    GERD (gastroesophageal reflux disease)     Headache ,    Herpes genitalia     History of chicken pox     Low back pain     Ra 2016    Memory disorder 2024    I forget things more often lately    Neuropathy 2013, 2016    Trigeminal neuralgia, optis neuritis    Rheumatoid arthritis (HCC)       Past Surgical History:   Procedure Laterality Date    ANKLE SURGERY Right 2020     SECTION      x 3    COLPOSCOPY      DILATION AND CURETTAGE OF UTERUS  2011    Miscarriage    GYN  2018    tubes removed    WISDOM TOOTH EXTRACTION       Social History     Tobacco Use    Smoking status: Never    Smokeless tobacco: Never   Substance Use Topics    Alcohol use: Not Currently     Comment: Over a year       Allergies   Allergen Reactions    Gabapentin Hives and Swelling    Methotrexate Other (See Comments)     Heart burn         Review of Systems:  A comprehensive review of systems was negative except for that written in the History of Present Illness.     Objective:     Physical Exam:   NAD, heart with regular rate and rhythm, lungs clear to auscultation    Extremity exam:  Cervical spine has normal range of motion without tenderness to palpation, negative Spurling's test. Shoulders and elbows have normal pain free range of motion.    Examination of the bilateral upper extremity demonstrates Decreased sensation to light touch in the median distribution, normal sensation in ulnar and radial distribution, Positive carpal tunnel compression

## 2024-06-10 NOTE — ANESTHESIA POSTPROCEDURE EVALUATION
Department of Anesthesiology  Postprocedure Note    Patient: Gaby Dinh  MRN: [id number]  YOB: 1980  Date of evaluation: 6/10/2024    Procedure Summary       Date: 06/10/24 Room / Location: [facility] OP OR 05 / D [facility]    Anesthesia Start: 0758 Anesthesia Stop: 0821    Procedure: CARPAL TUNNEL RELEASE ultra sound guided on the right (Right: Wrist) Diagnosis:       Carpal tunnel syndrome of right wrist      (Carpal tunnel syndrome of right wrist [G56.01])    Surgeons: Crystal Reyes MD Responsible Provider: Alfonzo Ham MD    Anesthesia Type: TIVA ASA Status: 2            Anesthesia Type: TIVA    Asim Phase I: Asim Score: 8    Asim Phase II:      Anesthesia Post Evaluation    Patient location during evaluation: PACU  Patient participation: complete - patient participated  Level of consciousness: awake  Airway patency: patent  Nausea & Vomiting: no nausea and no vomiting  Cardiovascular status: hemodynamically stable  Respiratory status: acceptable, nonlabored ventilation and spontaneous ventilation  Hydration status: stable  Multimodal analgesia pain management approach  Pain management: adequate    No notable events documented.

## 2024-06-11 NOTE — PROGRESS NOTES
Emi Otero MD   Bariatric & Advanced Laparoscopic Surgery & Endoscopy  [address] [facility], [address]  [place]  [address]                             Office [phone] Fax [phone]        Date of visit: 6/12/2024      History and Physical    Patient: Gaby Dinh MRN: [id number]     YOB: 1980  Age: 43 y.o.  Sex: female              PCP: Jinny Leon APRN - CNP   Pharmacy:   [facility] [address] [place] [address] -  [phone] - F [phone]  [address] [place] [address]  Phone: [phone] Fax: [phone]     Date:  6/12/2024        5 month(s) post medication start  She has lost,  5 lbs. since her last office visit.    How is patient taking medication? Metformin 1000 mg BID, Wegovy 0.5 mg      She stopped Orlistat because it makes her stomach feel heavy. She reports having more appetite.    Clinical Assessment and Physical Exam:    she is doing very well today and is feeling good. She reports that she has been adhering to the protein first diet without difficulty.  She denies any tachycardia, abdominal pain, nausea or vomiting.  She does report having problems with constipation.     Protein:  40-50 grams per day  Fluids:    80 ounces per day  Physical Activity:  walking 45-60 min 4x/wk    Initial Consult Date 10/25/2023   Initial Weight 245   Ideal Body Weight 143   Initial BMI 42.72   Initial Neck Circumference 15   Initial Waist Circumference 48.5   Initial Hip Circumference 51   Initial BF% 50.6       Medication See history below   Medication Start Date 1/9/2024       Today's Weight 88 kg (194 lb)   Today's BMI Body mass index is 33.83 kg/m².    Today's Neck Circumference 14   Today's Waist 41   Today's Hip 46   Today's BF% 46.5       Evaluation of Co-morbid Conditions  Sleep Apnea No, uses CPAP- NA   Diabetes No, insulin dependent- NA   Hypertension No, number of medications- 0   GERD RS   Hyperlipidemia

## 2024-06-11 NOTE — PROGRESS NOTES
OBESITY MEDICINE NUTRITION REASSESSMENT     Assessment:    Pt reports good dietary compliance since last office visit. PT eating at least 3x/d. Pt is drinking water. Pt is exercising via swimming for 90min 2 x weekly.   Diet Recall: Pt is on a medication from previous surgery that is causing nausea              Breakfast: Applesauce              Lunch: Mixed veggies              Dinner: Baked potato     Intervention:   Evaluated diet recall and identified modifications.  Encouraged pt to focus on lean protein when her previous surgery medication side effects subside  Encouraged continued and increased activity.          Monitoring and Evaluation:  Monitor for continued safe, supervised weight loss for OM.    F/U per MD Nadine Simpson MBA. RD, LD

## 2024-06-12 ENCOUNTER — OFFICE VISIT (OUTPATIENT)
Dept: SURGERY | Age: 44
End: 2024-06-12
Payer: COMMERCIAL

## 2024-06-12 VITALS
DIASTOLIC BLOOD PRESSURE: 74 MMHG | SYSTOLIC BLOOD PRESSURE: 116 MMHG | HEART RATE: 82 BPM | BODY MASS INDEX: 33.12 KG/M2 | HEIGHT: 64 IN | WEIGHT: 194 LBS

## 2024-06-12 DIAGNOSIS — K21.9 GASTROESOPHAGEAL REFLUX DISEASE, UNSPECIFIED WHETHER ESOPHAGITIS PRESENT: Primary | ICD-10-CM

## 2024-06-12 DIAGNOSIS — E65 ABDOMINAL OBESITY: ICD-10-CM

## 2024-06-12 DIAGNOSIS — M05.9 RHEUMATOID ARTHRITIS WITH POSITIVE RHEUMATOID FACTOR, INVOLVING UNSPECIFIED SITE (HCC): ICD-10-CM

## 2024-06-12 DIAGNOSIS — Z71.3 NUTRITIONAL COUNSELING: ICD-10-CM

## 2024-06-12 DIAGNOSIS — E66.01 CLASS 3 SEVERE OBESITY DUE TO EXCESS CALORIES WITH SERIOUS COMORBIDITY AND BODY MASS INDEX (BMI) OF 40.0 TO 44.9 IN ADULT (HCC): ICD-10-CM

## 2024-06-12 PROCEDURE — 99215 OFFICE O/P EST HI 40 MIN: CPT | Performed by: SURGERY

## 2024-06-12 RX ORDER — SEMAGLUTIDE 1 MG/.5ML
1 INJECTION, SOLUTION SUBCUTANEOUS
Qty: 2 ML | Refills: 0 | Status: SHIPPED | OUTPATIENT
Start: 2024-06-12 | End: 2024-07-12

## 2024-06-18 RX ORDER — TRAMADOL HYDROCHLORIDE 50 MG/1
50 TABLET ORAL EVERY 6 HOURS PRN
COMMUNITY

## 2024-06-18 RX ORDER — ACETAMINOPHEN 500 MG
500 TABLET ORAL EVERY 6 HOURS PRN
COMMUNITY

## 2024-06-18 NOTE — PERIOP NOTE
Phone pre-assessment completed.    Verified name & . Order to obtain consent not found in EHR, however patient verifies case posting.    Type 1A surgery,  assessment complete.  Orders not received.    Labs per surgeon: unknown  Labs per anesthesia protocol: none      Patient answered medical/surgical history questions at their best of ability. All prior to admission medications documented in EPIC.    Patient instructed to continue all prescribed medications unless otherwise instructed below:    Prescription meds to hold: diclofenac- hold 5 days prior to surgery. Wegovy- do not take next dose. Pt advised on clear liquid diet 24 hours prior to surgery per anesthesia protocol.  Verbalizes understanding of instructions. All questions answered to satisfaction of the patient/representative.    CLEAR LIQUID DIET    Things included in a clear liquid diet:     Water  Black Coffee (may have sugar but no milk or cream)  Tea  Any type soft drink  Apple, Cranberry, or Grape juice  Chicken bouillon or broth  Beef bouillon or broth  Popsicles  Jell-O (any flavor), NO solid fruit mixed in  Hard candy that is clear, such as lifesavers or lemon drops    Things NOT included in clear liquid:     Milk and milk products  Milkshakes  Any solid food  Any solid soup with solid ingredients such as noodles  Any creamed soup  Gum or Mints  Orange juice (or any other juice containing pulp)       Please stop all vitamins & supplements 7 days prior to surgery and stop all NSAIDS ( ASA/Excedrin/BC & Goody Powder, ibuprofen/Motrin/Advil, naproxen/Aleve) 5 days before your surgery. Should you have a surgery date that does not allow for the amount of time instructed above, please stop taking vitamins, supplements, and NSAIDS IMMEDIATELY.    Patient instructed to take ONLY the following medications day of surgery per anesthesia guidelines with sip of water: hydroxychloroquine, pantoprazole, if needed lorazepam, if needed tramadol .Continue all

## 2024-06-20 ENCOUNTER — TELEPHONE (OUTPATIENT)
Dept: OBGYN CLINIC | Age: 44
End: 2024-06-20

## 2024-06-20 NOTE — TELEPHONE ENCOUNTER
Patient LM stating she needs rx refill on valtrex.     Called patient to verify how she was taking it. Is she taking it for out breaks or daily?     LMVM to verify directions on usage. nori

## 2024-06-21 NOTE — PERIOP NOTE
Preop department called to notify patient of arrival time for scheduled procedure. Instructions given to   - Arrive at [facility] Entrance [address].  - Remain NPO after midnight, unless otherwise indicated, including gum, mints, and ice chips.   - Have a responsible adult to drive patient to the hospital, stay during surgery, and patient will need supervision 24 hours after anesthesia.   - Use antibacterial soap in shower the night before surgery and on the morning of surgery.       Was patient contacted: YES  Voicemail left:   Numbers contacted: [phone]   Arrival time: 0630

## 2024-06-23 DIAGNOSIS — G56.02 LEFT CARPAL TUNNEL SYNDROME: Primary | ICD-10-CM

## 2024-06-24 ENCOUNTER — ANESTHESIA EVENT (OUTPATIENT)
Dept: SURGERY | Age: 44
End: 2024-06-24
Payer: COMMERCIAL

## 2024-06-24 ENCOUNTER — ANESTHESIA (OUTPATIENT)
Dept: SURGERY | Age: 44
End: 2024-06-24
Payer: COMMERCIAL

## 2024-06-24 ENCOUNTER — HOSPITAL ENCOUNTER (OUTPATIENT)
Age: 44
Setting detail: OUTPATIENT SURGERY
Discharge: HOME | End: 2024-06-24
Attending: ORTHOPAEDIC SURGERY | Admitting: ORTHOPAEDIC SURGERY
Payer: COMMERCIAL

## 2024-06-24 VITALS
DIASTOLIC BLOOD PRESSURE: 73 MMHG | OXYGEN SATURATION: 100 % | HEIGHT: 64 IN | HEART RATE: 80 BPM | RESPIRATION RATE: 13 BRPM | TEMPERATURE: 98.2 F | WEIGHT: 190 LBS | SYSTOLIC BLOOD PRESSURE: 116 MMHG | BODY MASS INDEX: 32.44 KG/M2

## 2024-06-24 PROCEDURE — 7100000001 HC PACU RECOVERY - ADDTL 15 MIN: Performed by: ORTHOPAEDIC SURGERY

## 2024-06-24 PROCEDURE — 76998 US GUIDE INTRAOP: CPT | Performed by: ORTHOPAEDIC SURGERY

## 2024-06-24 PROCEDURE — 2500000003 HC RX 250 WO HCPCS: Performed by: ORTHOPAEDIC SURGERY

## 2024-06-24 PROCEDURE — 2709999900 HC NON-CHARGEABLE SUPPLY: Performed by: ORTHOPAEDIC SURGERY

## 2024-06-24 PROCEDURE — 2580000003 HC RX 258: Performed by: ANESTHESIOLOGY

## 2024-06-24 PROCEDURE — 7100000000 HC PACU RECOVERY - FIRST 15 MIN: Performed by: ORTHOPAEDIC SURGERY

## 2024-06-24 PROCEDURE — 3600000012 HC SURGERY LEVEL 2 ADDTL 15MIN: Performed by: ORTHOPAEDIC SURGERY

## 2024-06-24 PROCEDURE — 6360000002 HC RX W HCPCS: Performed by: ORTHOPAEDIC SURGERY

## 2024-06-24 PROCEDURE — 6360000002 HC RX W HCPCS: Performed by: NURSE PRACTITIONER

## 2024-06-24 PROCEDURE — 64721 CARPAL TUNNEL SURGERY: CPT | Performed by: ORTHOPAEDIC SURGERY

## 2024-06-24 PROCEDURE — 3700000001 HC ADD 15 MINUTES (ANESTHESIA): Performed by: ORTHOPAEDIC SURGERY

## 2024-06-24 PROCEDURE — 7100000010 HC PHASE II RECOVERY - FIRST 15 MIN: Performed by: ORTHOPAEDIC SURGERY

## 2024-06-24 PROCEDURE — 2500000003 HC RX 250 WO HCPCS: Performed by: NURSE ANESTHETIST, CERTIFIED REGISTERED

## 2024-06-24 PROCEDURE — 6360000002 HC RX W HCPCS: Performed by: NURSE ANESTHETIST, CERTIFIED REGISTERED

## 2024-06-24 PROCEDURE — 3600000002 HC SURGERY LEVEL 2 BASE: Performed by: ORTHOPAEDIC SURGERY

## 2024-06-24 PROCEDURE — 3700000000 HC ANESTHESIA ATTENDED CARE: Performed by: ORTHOPAEDIC SURGERY

## 2024-06-24 PROCEDURE — 2720000010 HC SURG SUPPLY STERILE: Performed by: ORTHOPAEDIC SURGERY

## 2024-06-24 RX ORDER — MIDAZOLAM HYDROCHLORIDE 2 MG/2ML
2 INJECTION, SOLUTION INTRAMUSCULAR; INTRAVENOUS
Status: DISCONTINUED | OUTPATIENT
Start: 2024-06-24 | End: 2024-06-24 | Stop reason: HOSPADM

## 2024-06-24 RX ORDER — BUPIVACAINE HYDROCHLORIDE 2.5 MG/ML
INJECTION, SOLUTION EPIDURAL; INFILTRATION; INTRACAUDAL PRN
Status: DISCONTINUED | OUTPATIENT
Start: 2024-06-24 | End: 2024-06-24 | Stop reason: ALTCHOICE

## 2024-06-24 RX ORDER — PROPOFOL 10 MG/ML
INJECTION, EMULSION INTRAVENOUS PRN
Status: DISCONTINUED | OUTPATIENT
Start: 2024-06-24 | End: 2024-06-24 | Stop reason: SDUPTHER

## 2024-06-24 RX ORDER — SODIUM CHLORIDE, SODIUM LACTATE, POTASSIUM CHLORIDE, CALCIUM CHLORIDE 600; 310; 30; 20 MG/100ML; MG/100ML; MG/100ML; MG/100ML
INJECTION, SOLUTION INTRAVENOUS CONTINUOUS
Status: DISCONTINUED | OUTPATIENT
Start: 2024-06-24 | End: 2024-06-24 | Stop reason: HOSPADM

## 2024-06-24 RX ORDER — SODIUM CHLORIDE 0.9 % (FLUSH) 0.9 %
5-40 SYRINGE (ML) INJECTION EVERY 12 HOURS SCHEDULED
Status: DISCONTINUED | OUTPATIENT
Start: 2024-06-24 | End: 2024-06-24 | Stop reason: HOSPADM

## 2024-06-24 RX ORDER — TRAMADOL HYDROCHLORIDE 50 MG/1
50 TABLET ORAL EVERY 6 HOURS PRN
Qty: 20 TABLET | Refills: 0 | Status: SHIPPED | OUTPATIENT
Start: 2024-06-24 | End: 2024-06-29

## 2024-06-24 RX ORDER — PROCHLORPERAZINE EDISYLATE 5 MG/ML
5 INJECTION INTRAMUSCULAR; INTRAVENOUS
Status: DISCONTINUED | OUTPATIENT
Start: 2024-06-24 | End: 2024-06-24 | Stop reason: HOSPADM

## 2024-06-24 RX ORDER — SODIUM CHLORIDE 0.9 % (FLUSH) 0.9 %
5-40 SYRINGE (ML) INJECTION PRN
Status: DISCONTINUED | OUTPATIENT
Start: 2024-06-24 | End: 2024-06-24 | Stop reason: HOSPADM

## 2024-06-24 RX ORDER — NALOXONE HYDROCHLORIDE 0.4 MG/ML
INJECTION, SOLUTION INTRAMUSCULAR; INTRAVENOUS; SUBCUTANEOUS PRN
Status: DISCONTINUED | OUTPATIENT
Start: 2024-06-24 | End: 2024-06-24 | Stop reason: HOSPADM

## 2024-06-24 RX ORDER — SODIUM CHLORIDE 9 MG/ML
INJECTION, SOLUTION INTRAVENOUS PRN
Status: DISCONTINUED | OUTPATIENT
Start: 2024-06-24 | End: 2024-06-24 | Stop reason: HOSPADM

## 2024-06-24 RX ORDER — HYDROMORPHONE HYDROCHLORIDE 2 MG/ML
0.5 INJECTION, SOLUTION INTRAMUSCULAR; INTRAVENOUS; SUBCUTANEOUS EVERY 5 MIN PRN
Status: DISCONTINUED | OUTPATIENT
Start: 2024-06-24 | End: 2024-06-24 | Stop reason: HOSPADM

## 2024-06-24 RX ORDER — LIDOCAINE HYDROCHLORIDE 20 MG/ML
INJECTION, SOLUTION EPIDURAL; INFILTRATION; INTRACAUDAL; PERINEURAL PRN
Status: DISCONTINUED | OUTPATIENT
Start: 2024-06-24 | End: 2024-06-24 | Stop reason: SDUPTHER

## 2024-06-24 RX ORDER — LIDOCAINE HYDROCHLORIDE 10 MG/ML
INJECTION, SOLUTION INFILTRATION; PERINEURAL PRN
Status: DISCONTINUED | OUTPATIENT
Start: 2024-06-24 | End: 2024-06-24 | Stop reason: ALTCHOICE

## 2024-06-24 RX ORDER — OXYCODONE HYDROCHLORIDE 5 MG/1
5 TABLET ORAL
Status: DISCONTINUED | OUTPATIENT
Start: 2024-06-24 | End: 2024-06-24 | Stop reason: HOSPADM

## 2024-06-24 RX ADMIN — SODIUM CHLORIDE, POTASSIUM CHLORIDE, SODIUM LACTATE AND CALCIUM CHLORIDE: 600; 310; 30; 20 INJECTION, SOLUTION INTRAVENOUS at 07:21

## 2024-06-24 RX ADMIN — PROPOFOL 200 MCG/KG/MIN: 10 INJECTION, EMULSION INTRAVENOUS at 08:45

## 2024-06-24 RX ADMIN — LIDOCAINE HYDROCHLORIDE 80 MG: 20 INJECTION, SOLUTION EPIDURAL; INFILTRATION; INTRACAUDAL; PERINEURAL at 08:44

## 2024-06-24 RX ADMIN — PROPOFOL 100 MG: 10 INJECTION, EMULSION INTRAVENOUS at 08:44

## 2024-06-24 RX ADMIN — Medication 2 G: at 08:45

## 2024-06-24 ASSESSMENT — PAIN SCALES - GENERAL
PAINLEVEL_OUTOF10: 0
PAINLEVEL_OUTOF10: 0

## 2024-06-24 ASSESSMENT — PAIN - FUNCTIONAL ASSESSMENT: PAIN_FUNCTIONAL_ASSESSMENT: 0-10

## 2024-06-24 NOTE — ANESTHESIA PRE PROCEDURE
Department of Anesthesiology  Preprocedure Note       Name:  Gaby Dinh   Age:  43 y.o.  :  1980                                          MRN:  [id number]         Date:  2024      Surgeon: Surgeon(s):  Crystal Reyes MD    Procedure: Procedure(s):  CARPAL TUNNEL RELEASE ultra sound guided on the left    Medications prior to admission:   Prior to Admission medications    Medication Sig Start Date End Date Taking? Authorizing Provider   traMADol (ULTRAM) 50 MG tablet Take 1 tablet by mouth every 6 hours as needed for Pain.   Yes Darya Shepherd MD   acetaminophen (TYLENOL) 500 MG tablet Take 1 tablet by mouth every 6 hours as needed for Pain   Yes Darya Shepherd MD   traMADol (ULTRAM) 50 MG tablet Take 1 tablet by mouth every 6 hours as needed for Pain for up to 5 days. Max Daily Amount: 200 mg 24  Jinny Leon, APRN - CNP   Semaglutide-Weight Management (WEGOVY) 1 MG/0.5ML SOAJ SC injection Inject 1 mg into the skin every 7 days 24  Emi Tristan MD   Fremanezumab-vfrm (AJOVY) 225 MG/1.5ML SOSY Inject into the skin every 30 days  Patient not taking: Reported on 2024    Darya Shepherd MD   rimegepant sulfate (NURTEC) 75 MG TBDP Take 1 tablet by mouth daily as needed    Darya Shepherd MD   Semaglutide-Weight Management (WEGOVY) 0.5 MG/0.5ML SOAJ SC injection Inject 0.5 mg into the skin every 7 days 5/16/24 6/15/24  Emi Tristan MD   QUEtiapine (SEROQUEL) 25 MG tablet Take 2 tablets by mouth 2 times daily  Patient not taking: Reported on 2024    Darya Shepherd MD   metFORMIN (GLUCOPHAGE) 1000 MG tablet Take 1 tablet by mouth 2 times daily (with meals) 24  Emi Tristan MD   metFORMIN (GLUCOPHAGE) 1000 MG tablet Take 1 tablet by mouth 2 times daily (with meals) 2/8/24 3/28/24  Emi Tristan MD   golimumab (SIMPONI ARIA) 50 MG/4ML SOLN Infuse

## 2024-06-24 NOTE — H&P
History and Physical    Subjective:     Gaby Dinh is a 43 y.o. scheduled for left US CTR.    Past Medical History:   Diagnosis Date    Abnormal Papanicolaou smear of cervix 2004    repeat normal    Adverse effect of anesthesia 2017    woke up crying and hyperventilating and had to be re-sedated    Anemia     never required blood transfusions    Anxiety     Arthritis     Depression     managed with meds    Difficulty walking 2024    I stumble light dizziness    GERD (gastroesophageal reflux disease)     protonix prn, due to methotrexate    Headache ,    Herpes genitalia     History of chicken pox     Low back pain     Ra 2016    Memory disorder 2024    I forget things more often lately    Neuropathy , 2016    Trigeminal neuralgia, optis neuritis    Obesity     BMI 33.13 - metformin and wagovy, denies Diabetes    Rheumatoid arthritis (HCC)       Past Surgical History:   Procedure Laterality Date    ANKLE SURGERY Right 2020    CARPAL TUNNEL RELEASE Right 06/10/2024    CARPAL TUNNEL RELEASE ultra sound guided on the right performed by Crystal Reyes MD at [facility]     SECTION      x 3     SECTION      x3    COLPOSCOPY      DILATION AND CURETTAGE OF UTERUS  2011    Miscarriage    SALPINGECTOMY Bilateral 2019    WISDOM TOOTH EXTRACTION       Social History     Tobacco Use    Smoking status: Never    Smokeless tobacco: Never   Substance Use Topics    Alcohol use: Not Currently     Comment: Over a year       Allergies   Allergen Reactions    Gabapentin Hives and Swelling    Methotrexate Other (See Comments)     Heart burn         Review of Systems:  A comprehensive review of systems was negative except for that written in the History of Present Illness.     Objective:     Physical Exam:   NAD, heart with regular rate and rhythm, lungs clear to auscultation    Extremity exam:  Cervical spine has normal range of motion without tenderness to palpation, negative Spurling's

## 2024-06-24 NOTE — OP NOTE
Hand Surgery Operative Note      Gaby Dinh   43 y.o.   female   6/24/2024     Pre-op diagnosis: Left Carpal Tunnel syndrome  Post op diagnosis: same      Procedure: Left Ultrasound-Guided Carpal Tunnel release cpt 53321, 65381     Surgeon: Crystal Reyes Jr, MD, PhD      Anesthesia: Local + MAC      Tourniquet time: * No tourniquets in log *      Procedure indications: Patient with radial digit numbness recalcitrant to conservative measures with positive documentation of NCV findings consistent with carpal tunnel syndrome. After Thorough discussion, the patient decided to proceed with surgical management. We discussed in detail surgical risks including scar, pain, bleeding, infection, anesthetic risks, neurovascular injury, need for further surgery,  weakness, stiffness, risk of death and potential risk of other unforseen complication.      Procedure description:      Patient was placed in the supine position and after appropriate time-out and side, site and procedure confirmed.     Using a sterile ultrasound cover and sterile gel, relevant anatomical landmarks were identified, including but not limited to the median nerve, thenar motor branch/recurrent motor branch of the median nerve, palmar cutaneous branch of the median nerve, median and ulnar digital nerves and any visualized communications, ulnar vessels and superficial palmar arch, palmar fascia and distal transverse carpal ligament. A sterile ink marker was used to ronal the borders of the transverse and longitudinal safe zones as well as the incision site in the proximal carpal tunnel region. The safe zones were re-scanned to ensure acceptable anatomy and sonographic visualization.    A regional anesthetic was administered. A 25 gauge needle was used to create a small skin wheel at the incision site using 2 ml of buffered 1% lidocaine plain. Following this, under the guidance of ultrasound local anesthesia was delivered deep and superficial to the

## 2024-06-24 NOTE — ANESTHESIA POSTPROCEDURE EVALUATION
Department of Anesthesiology  Postprocedure Note    Patient: Gaby Dinh  MRN: [id number]  YOB: 1980  Date of evaluation: 6/24/2024    Procedure Summary       Date: 06/24/24 Room / Location: [facility] OP OR 05 / [facility]    Anesthesia Start: 0839 Anesthesia Stop: 0905    Procedure: CARPAL TUNNEL RELEASE ultra sound guided on the left (Left: Hand) Diagnosis:       Left carpal tunnel syndrome      (Left carpal tunnel syndrome [G56.02])    Surgeons: Crystal Reyes MD Responsible Provider: Brianne Sewell MD    Anesthesia Type: TIVA ASA Status: 3            Anesthesia Type: TIVA    Asim Phase I: Asim Score: 8    Asim Phase II:      Anesthesia Post Evaluation    Patient location during evaluation: PACU  Patient participation: complete - patient participated  Level of consciousness: awake and alert  Airway patency: patent  Nausea & Vomiting: no nausea and no vomiting  Cardiovascular status: hemodynamically stable  Respiratory status: acceptable, nonlabored ventilation and spontaneous ventilation  Hydration status: euvolemic  Comments: /61   Pulse 81   Temp 98.2 °F (36.8 °C) (Temporal)   Resp 22   Ht 1.613 m (5' 3.5\")   Wt 86.2 kg (190 lb)   LMP 06/09/2024 Comment: tubal  SpO2 99%   BMI 33.13 kg/m²     Multimodal analgesia pain management approach  Pain management: adequate and satisfactory to patient    No notable events documented.

## 2024-07-09 ENCOUNTER — EVALUATION (OUTPATIENT)
Age: 44
End: 2024-07-09
Payer: COMMERCIAL

## 2024-07-09 ENCOUNTER — OFFICE VISIT (OUTPATIENT)
Age: 44
End: 2024-07-09

## 2024-07-09 ENCOUNTER — OFFICE VISIT (OUTPATIENT)
Dept: NEUROLOGY | Age: 44
End: 2024-07-09
Payer: COMMERCIAL

## 2024-07-09 VITALS
SYSTOLIC BLOOD PRESSURE: 96 MMHG | WEIGHT: 198 LBS | BODY MASS INDEX: 34.52 KG/M2 | HEART RATE: 87 BPM | DIASTOLIC BLOOD PRESSURE: 68 MMHG | OXYGEN SATURATION: 98 %

## 2024-07-09 DIAGNOSIS — Z75.8 DOES NOT HAVE PRIMARY CARE PROVIDER: ICD-10-CM

## 2024-07-09 DIAGNOSIS — M25.641 STIFFNESS OF FINGER JOINT OF RIGHT HAND: ICD-10-CM

## 2024-07-09 DIAGNOSIS — F32.A DEPRESSION, UNSPECIFIED DEPRESSION TYPE: ICD-10-CM

## 2024-07-09 DIAGNOSIS — Z78.9 IMPAIRED MOBILITY AND ADLS: ICD-10-CM

## 2024-07-09 DIAGNOSIS — H93.12 TINNITUS OF LEFT EAR: ICD-10-CM

## 2024-07-09 DIAGNOSIS — G56.02 LEFT CARPAL TUNNEL SYNDROME: Primary | ICD-10-CM

## 2024-07-09 DIAGNOSIS — G56.02 LEFT CARPAL TUNNEL SYNDROME: ICD-10-CM

## 2024-07-09 DIAGNOSIS — M79.641 PAIN IN BOTH HANDS: Primary | ICD-10-CM

## 2024-07-09 DIAGNOSIS — H73.892 RETRACTED EAR DRUM, LEFT: ICD-10-CM

## 2024-07-09 DIAGNOSIS — M79.642 PAIN IN BOTH HANDS: Primary | ICD-10-CM

## 2024-07-09 DIAGNOSIS — Z74.09 IMPAIRED MOBILITY AND ADLS: ICD-10-CM

## 2024-07-09 DIAGNOSIS — G43.109 MIGRAINE WITH AURA AND WITHOUT STATUS MIGRAINOSUS, NOT INTRACTABLE: Primary | ICD-10-CM

## 2024-07-09 DIAGNOSIS — R29.898 DECREASED GRIP STRENGTH: ICD-10-CM

## 2024-07-09 DIAGNOSIS — G50.0 TRIGEMINAL NEURALGIA OF RIGHT SIDE OF FACE: ICD-10-CM

## 2024-07-09 PROCEDURE — 99024 POSTOP FOLLOW-UP VISIT: CPT | Performed by: NURSE PRACTITIONER

## 2024-07-09 PROCEDURE — 97165 OT EVAL LOW COMPLEX 30 MIN: CPT | Performed by: OCCUPATIONAL THERAPIST

## 2024-07-09 PROCEDURE — 97110 THERAPEUTIC EXERCISES: CPT | Performed by: OCCUPATIONAL THERAPIST

## 2024-07-09 PROCEDURE — 99215 OFFICE O/P EST HI 40 MIN: CPT | Performed by: NURSE PRACTITIONER

## 2024-07-09 PROCEDURE — 97140 MANUAL THERAPY 1/> REGIONS: CPT | Performed by: OCCUPATIONAL THERAPIST

## 2024-07-09 RX ORDER — FREMANEZUMAB-VFRM 225 MG/1.5ML
225 INJECTION SUBCUTANEOUS
Qty: 4.5 ML | Refills: 1 | Status: SHIPPED | OUTPATIENT
Start: 2024-07-09 | End: 2024-08-08

## 2024-07-09 RX ORDER — HYDROCODONE BITARTRATE AND ACETAMINOPHEN 5; 325 MG/1; MG/1
1 TABLET ORAL EVERY 6 HOURS PRN
Qty: 15 TABLET | Refills: 0 | Status: SHIPPED | OUTPATIENT
Start: 2024-07-09 | End: 2024-07-14

## 2024-07-09 RX ORDER — UBROGEPANT 100 MG/1
100 TABLET ORAL PRN
Qty: 16 TABLET | Refills: 6 | Status: SHIPPED | OUTPATIENT
Start: 2024-07-09

## 2024-07-09 ASSESSMENT — PATIENT HEALTH QUESTIONNAIRE - PHQ9
SUM OF ALL RESPONSES TO PHQ QUESTIONS 1-9: 0
SUM OF ALL RESPONSES TO PHQ9 QUESTIONS 1 & 2: 0
SUM OF ALL RESPONSES TO PHQ QUESTIONS 1-9: 0
SUM OF ALL RESPONSES TO PHQ QUESTIONS 1-9: 0
2. FEELING DOWN, DEPRESSED OR HOPELESS: NOT AT ALL
DEPRESSION UNABLE TO ASSESS: FUNCTIONAL CAPACITY MOTIVATION LIMITS ACCURACY
SUM OF ALL RESPONSES TO PHQ QUESTIONS 1-9: 0
1. LITTLE INTEREST OR PLEASURE IN DOING THINGS: NOT AT ALL

## 2024-07-09 ASSESSMENT — ENCOUNTER SYMPTOMS
VOMITING: 1
NAUSEA: 1
PHOTOPHOBIA: 1

## 2024-07-09 NOTE — ASSESSMENT & PLAN NOTE
Flonase, 1 spray each nostril once daily.  This can be purchased over-the-counter.  No signs of acute otitis media

## 2024-07-09 NOTE — PROGRESS NOTES
Plan:  MRI brain did not show any involvement with her right trigeminal nerve.  Symptoms have improved since I saw her last.  Will monitor for return of symptoms and address accordingly.  6. Does not have primary care provider  Assessment & Plan:  Referral sent to [facility] internal medicine to establish care  Orders:  -     [facility] Medicine, [place]         Headache Education:     To avoid a pain medication overuse headache trying not to take pain medicines more than 2-3 doses a week. To help relieve headache symptoms without taking pain medicine lie down in a darkroom and drink glass of water.  Consider lifestyle modification including good sleep hygiene, routine medial schedules, regular exercise and managing triggers.  Keep a headache diary  to reveal triggers and possible patterns. Migraine tamie is a great resource for this. It can be found on the flex store.   Triggers may be: Food, stress, perfumes, alcohol, or even chocolate.  Drink plenty of water and try to get 8 hours of sleep each night to reduce risk factors that may cause headaches.          [ *NOTE: parts of this note were produced using artificial speech recognition software, which may have inadvertent errors in the produced wordings. ]      [facility]

## 2024-07-09 NOTE — ASSESSMENT & PLAN NOTE
MRI brain did not show any involvement with her right trigeminal nerve.  Symptoms have improved since I saw her last.  Will monitor for return of symptoms and address accordingly.

## 2024-07-09 NOTE — ASSESSMENT & PLAN NOTE
Ajovy has provided greater than 60% reduction in migraine frequency.  She did have a misfire with her May dosing and is overdue for this.  Provided patient with samples administered in office.  Advised patient to notify us of difficulties such as this in future.      Discontinue Nurtec, ineffective.  Start Ubrelvy. Take 1 tablet at onset of migraine, may be repeated in 2 hours for a max of 2 doses in 24 hours.

## 2024-07-09 NOTE — PROGRESS NOTES
Orthopaedic Hand Surgery Note    Name: Gaby Dinh  YOB: 1980  Gender: female  MRN: [id number]    HPI: Patient is status post CARPAL TUNNEL RELEASE ultra sound guided on the left - Left on 6/24/2024.  Patient is 2 weeks out from surgery on the left and 4 weeks from surgery on the right.  She reports there has been no change in her symptoms, and reports her symptoms are somewhat worse.  She says she is not sleeping well.  She does not like the cold air to hit her incisions.  She has not been able to get her scheduled infusion with rheumatology due to insurance authorization.    Physical Examination:  Wound healing well.  There is no erythema or drainage.  Patient is unable to make a composite fist.      Assessment:   1. Left carpal tunnel syndrome        Status post CARPAL TUNNEL RELEASE ultra sound guided on the left - Left on 6/24/2024    Plan:  We will have therapy see her today.  We will reassess her progress back in 6 weeks.      Jinny Leon NP  Orthopaedic Surgery  07/09/24  11:06 AM

## 2024-07-09 NOTE — PROGRESS NOTES
primary functional deficit of  77% dysfunction.     After a brief chart/PMH and OT profile review, evaluation requiring minimal  assistance/modifications and simple patient and data analysis, I have determined the patient exhibits several (1-3) performance deficits. Comorbidities do not affect the patient's occupational performance. The following performance deficits (including but not limited to physical, cognitive and/or psychosocial components) result in activity limitations and participation restrictions: Dexterity, Mobility, Strength, Fine motor coordination, and Sensation    The patient would benefit from skilled occupational therapy services to address the deficits noted above for return to prior level of function.    PLAN OF CARE     Effective Dates/Duration: 7/9/2024 TO 9/7/2024 (60 days)   Frequency 2x/week   Interventions may include but are not limited to:   (48652) Therapeutic exercise to develop strength and endurance, range of motion, and flexibility  (79034) Manual Therapy:   Consisting of but not limited to hands on treatment by therapist for connective tissue massage, pain management, edema management, joint mobilization and manipulation, manual traction, passive range of motion, soft tissue and neural system mobilization/manipulation and therapeutic massage.  (86671) Therapeutic activities:Direct one on one patient contact with provider, use of dynamic activities to improve functional performance  Modalities prn to address pain, spasms, and swelling: (99450) Ultrasound/phonophoresis  (52536) Hot/cold pack  (85846) Fluidotherapy  (81387) Paraffin  Home exercise program (HEP) development  (61554) Neuromuscular Re-education: to improve balance, coordination, kinesthetic sense, posture and proprioception (e.g., proprioceptive and neuromuscular facilitation, desensitization techniques)    The referring medical provider has reviewed and approved this evaluation and plan of care as noted by the

## 2024-07-09 NOTE — PROGRESS NOTES
OBESITY MEDICINE NUTRITION REASSESSMENT     Assessment:    Pt reports good dietary compliance since last office visit. Pt is eating at least 3x/d. Pt is drinking water, tea, and espresso. Pt is exercising via Recumbent bike and walking.   Diet Recall:              Breakfast: Turkey breast, cheese, applesauce              Lunch: Smoked wings              Dinner: Red lentil noodles and veggies     Intervention:   Evaluated diet recall   Encouraged  increased activity.          Monitoring and Evaluation:  Monitor for continued safe, supervised weight loss for OM.    F/U per MD Nadine Simpson MBA. RD, LD

## 2024-07-11 ENCOUNTER — OFFICE VISIT (OUTPATIENT)
Dept: SURGERY | Age: 44
End: 2024-07-11
Payer: COMMERCIAL

## 2024-07-11 VITALS
HEIGHT: 64 IN | SYSTOLIC BLOOD PRESSURE: 111 MMHG | BODY MASS INDEX: 33.46 KG/M2 | DIASTOLIC BLOOD PRESSURE: 73 MMHG | WEIGHT: 196 LBS | HEART RATE: 76 BPM

## 2024-07-11 DIAGNOSIS — M05.9 RHEUMATOID ARTHRITIS WITH POSITIVE RHEUMATOID FACTOR, INVOLVING UNSPECIFIED SITE (HCC): ICD-10-CM

## 2024-07-11 DIAGNOSIS — E66.01 CLASS 3 SEVERE OBESITY DUE TO EXCESS CALORIES WITH SERIOUS COMORBIDITY AND BODY MASS INDEX (BMI) OF 40.0 TO 44.9 IN ADULT (HCC): ICD-10-CM

## 2024-07-11 DIAGNOSIS — Z71.3 NUTRITIONAL COUNSELING: ICD-10-CM

## 2024-07-11 DIAGNOSIS — E65 ABDOMINAL OBESITY: ICD-10-CM

## 2024-07-11 DIAGNOSIS — K21.9 GASTROESOPHAGEAL REFLUX DISEASE, UNSPECIFIED WHETHER ESOPHAGITIS PRESENT: Primary | ICD-10-CM

## 2024-07-11 PROCEDURE — 99215 OFFICE O/P EST HI 40 MIN: CPT | Performed by: SURGERY

## 2024-07-11 RX ORDER — SEMAGLUTIDE 1.7 MG/.75ML
1.7 INJECTION, SOLUTION SUBCUTANEOUS
Qty: 3 ML | Refills: 0 | Status: SHIPPED | OUTPATIENT
Start: 2024-07-11 | End: 2024-08-10

## 2024-07-11 NOTE — PROGRESS NOTES
Emi Otero MD   Bariatric & Advanced Laparoscopic Surgery & Endoscopy  [address], [address]  [place]  [address]                             Office [phone] Fax [phone]        Date of visit: 7/11/2024      History and Physical    Patient: Gaby Dinh MRN: [id number]     YOB: 1980  Age: 43 y.o.  Sex: female              PCP: Jinny Leon APRN - CNP   Pharmacy:   [facility] [address] [place] [address] -  [phone] - F [phone]  [address] [place] [address]  Phone: [phone] Fax: [phone]     Date:  7/11/2024        5 month(s) post medication start  She has lost,  12 lbs. since her last office visit.    How is patient taking medication? Metformin 1000 mg BID, Wegovy 1 mg          Clinical Assessment and Physical Exam:    she is doing very well today and is feeling good. She reports that she has been adhering to the protein first diet without difficulty.  She denies any tachycardia, abdominal pain, nausea or vomiting.  She does report having problems with constipation. Appetite is decreased.    Protein:  80 grams per day  Fluids:    64 ounces per day  Physical Activity:  recumbent bike and walking 1.5 hr 1-2x/wk    Initial Consult Date 10/25/2023   Initial Weight 245   Ideal Body Weight 143   Initial BMI 42.72   Initial Neck Circumference 15   Initial Waist Circumference 48.5   Initial Hip Circumference 51   Initial BF% 50.6       Medication See history below   Medication Start Date 1/9/2024       Today's Weight 88.9 kg (196 lb)   Today's BMI Body mass index is 34.18 kg/m².    Today's Neck Circumference 14   Today's Waist 41   Today's Hip 46   Today's BF% 46.5       Evaluation of Co-morbid Conditions  Sleep Apnea No, uses CPAP- NA   Diabetes No, insulin dependent- NA   Hypertension No, number of medications- 0   GERD RS   Hyperlipidemia No        MEDICATIONS:  Current Outpatient Medications

## 2024-07-15 NOTE — PROGRESS NOTES
less than 40% deficit.     Long term goals:  9/7/2024 (60 days)   Patient will have full thumb opposition for all ADL's/IADL's without difficulty.  Patient will have at least 40 psi of  strength bilaterally to improve ability to grasp and hold an object.  Patient will have at least 10 psi of 3 jaw denisse pinch strength affected hand to improve ability to pinch during ADL's like zippers, opening bags and turning a key.  Decrease pain in affected hand from 10 to 4 in order to sleep through the night and use affected hand with all ADL's.  Patient will have no visible edema in affected hand.   Pts Quick DASH functional assessment score will be less than 20% functional deficit.     [facility] Portal     OT Protocols

## 2024-07-16 ENCOUNTER — TREATMENT (OUTPATIENT)
Age: 44
End: 2024-07-16
Payer: COMMERCIAL

## 2024-07-16 DIAGNOSIS — Z78.9 IMPAIRED MOBILITY AND ADLS: ICD-10-CM

## 2024-07-16 DIAGNOSIS — Z74.09 IMPAIRED MOBILITY AND ADLS: ICD-10-CM

## 2024-07-16 DIAGNOSIS — R29.898 DECREASED GRIP STRENGTH: ICD-10-CM

## 2024-07-16 DIAGNOSIS — M25.641 STIFFNESS OF FINGER JOINT OF RIGHT HAND: ICD-10-CM

## 2024-07-16 DIAGNOSIS — M79.642 PAIN IN BOTH HANDS: Primary | ICD-10-CM

## 2024-07-16 DIAGNOSIS — M79.641 PAIN IN BOTH HANDS: Primary | ICD-10-CM

## 2024-07-16 PROCEDURE — 97035 APP MDLTY 1+ULTRASOUND EA 15: CPT | Performed by: OCCUPATIONAL THERAPIST

## 2024-07-16 PROCEDURE — 97140 MANUAL THERAPY 1/> REGIONS: CPT | Performed by: OCCUPATIONAL THERAPIST

## 2024-07-16 PROCEDURE — 97022 WHIRLPOOL THERAPY: CPT | Performed by: OCCUPATIONAL THERAPIST

## 2024-07-16 PROCEDURE — 97110 THERAPEUTIC EXERCISES: CPT | Performed by: OCCUPATIONAL THERAPIST

## 2024-07-18 NOTE — PROGRESS NOTES
[facility] OT [facility] ORTHOPAEDICS  [address] [address]  Dept: [phone]      Occupational Therapy Daily Note     Referring MD: Jinny Leon APRN - C*    Diagnosis:     ICD-10-CM    1. Pain in both hands  M79.641     M79.642       2. Impaired mobility and ADLs  Z74.09     Z78.9       3. Decreased  strength  R29.898       4. Stiffness of finger joint of right hand  M25.641            Surgery/Medical Dx: L Carpal tunnel release 6/24/24 and R carpal tunnel release on 6/10/24    Therapy precautions: None    Payor: Payor: BCBS /  /  /  Billing pattern: Commercial- substantial/midpoint time each CPT  Total Direct Treatment Time: 40 min  Total In Office Time: 50 min  Modifier needed: No  Episode visit count:  3     Preferred Name: Gaby    PERTINENT MEDICAL HISTORY     PMHX & Meds:   Past Medical History:   Diagnosis Date    Abnormal Papanicolaou smear of cervix 2004    repeat normal    Adverse effect of anesthesia 2017    woke up crying and hyperventilating and had to be re-sedated    Anemia     never required blood transfusions    Anxiety     Arthritis     Depression     managed with meds    Difficulty walking 02/2024    I stumble light dizziness    GERD (gastroesophageal reflux disease)     protonix prn, due to methotrexate    Headache 2013,    Herpes genitalia     History of chicken pox     Low back pain     Ra 2016    Memory disorder 01/2024    I forget things more often lately    Neuropathy 2013, 2016    Trigeminal neuralgia, optis neuritis    Obesity     BMI 33.13 - metformin and wagovy, denies Diabetes    Rheumatoid arthritis (HCC)    ,   Past Surgical History:   Procedure Laterality Date    ANKLE SURGERY Right 2020    CARPAL TUNNEL RELEASE Right 06/10/2024    CARPAL TUNNEL RELEASE ultra sound guided on the right performed by Crystal Reyes MD at [facility]    CARPAL TUNNEL RELEASE Left 6/24/2024    CARPAL TUNNEL RELEASE ultra sound guided on the left performed by Crystal Reyes MD

## 2024-07-19 ENCOUNTER — TREATMENT (OUTPATIENT)
Age: 44
End: 2024-07-19
Payer: COMMERCIAL

## 2024-07-19 DIAGNOSIS — M25.641 STIFFNESS OF FINGER JOINT OF RIGHT HAND: ICD-10-CM

## 2024-07-19 DIAGNOSIS — M79.642 PAIN IN BOTH HANDS: Primary | ICD-10-CM

## 2024-07-19 DIAGNOSIS — Z74.09 IMPAIRED MOBILITY AND ADLS: ICD-10-CM

## 2024-07-19 DIAGNOSIS — M79.641 PAIN IN BOTH HANDS: Primary | ICD-10-CM

## 2024-07-19 DIAGNOSIS — R29.898 DECREASED GRIP STRENGTH: ICD-10-CM

## 2024-07-19 DIAGNOSIS — Z78.9 IMPAIRED MOBILITY AND ADLS: ICD-10-CM

## 2024-07-19 PROCEDURE — 97035 APP MDLTY 1+ULTRASOUND EA 15: CPT | Performed by: OCCUPATIONAL THERAPIST

## 2024-07-19 PROCEDURE — 97140 MANUAL THERAPY 1/> REGIONS: CPT | Performed by: OCCUPATIONAL THERAPIST

## 2024-07-19 PROCEDURE — 97110 THERAPEUTIC EXERCISES: CPT | Performed by: OCCUPATIONAL THERAPIST

## 2024-07-19 PROCEDURE — 97022 WHIRLPOOL THERAPY: CPT | Performed by: OCCUPATIONAL THERAPIST

## 2024-07-25 ENCOUNTER — TREATMENT (OUTPATIENT)
Age: 44
End: 2024-07-25
Payer: COMMERCIAL

## 2024-07-25 DIAGNOSIS — Z78.9 IMPAIRED MOBILITY AND ADLS: ICD-10-CM

## 2024-07-25 DIAGNOSIS — Z74.09 IMPAIRED MOBILITY AND ADLS: ICD-10-CM

## 2024-07-25 DIAGNOSIS — M79.642 PAIN IN BOTH HANDS: Primary | ICD-10-CM

## 2024-07-25 DIAGNOSIS — R29.898 DECREASED GRIP STRENGTH: ICD-10-CM

## 2024-07-25 DIAGNOSIS — M25.641 STIFFNESS OF FINGER JOINT OF RIGHT HAND: ICD-10-CM

## 2024-07-25 DIAGNOSIS — M79.641 PAIN IN BOTH HANDS: Primary | ICD-10-CM

## 2024-07-25 PROCEDURE — 97035 APP MDLTY 1+ULTRASOUND EA 15: CPT | Performed by: OCCUPATIONAL THERAPIST

## 2024-07-25 PROCEDURE — 97140 MANUAL THERAPY 1/> REGIONS: CPT | Performed by: OCCUPATIONAL THERAPIST

## 2024-07-25 PROCEDURE — 97010 HOT OR COLD PACKS THERAPY: CPT | Performed by: OCCUPATIONAL THERAPIST

## 2024-07-25 PROCEDURE — 97110 THERAPEUTIC EXERCISES: CPT | Performed by: OCCUPATIONAL THERAPIST

## 2024-07-25 NOTE — PROGRESS NOTES
[facility] ORTHOPAEDICS  [address] [facility] [address] [address]  Dept: [phone]      Occupational Therapy Daily Note     Referring MD: Jinny Leon APRN - C*    Diagnosis:     ICD-10-CM    1. Pain in both hands  M79.641     M79.642       2. Impaired mobility and ADLs  Z74.09     Z78.9       3. Decreased  strength  R29.898       4. Stiffness of finger joint of right hand  M25.641            Surgery/Medical Dx: L Carpal tunnel release 6/24/24 and R carpal tunnel release on 6/10/24    Therapy precautions: None    Payor: Payor: BCBS /  /  /  Billing pattern: Commercial- substantial/midpoint time each CPT  Total Direct Treatment Time: 40 min  Total In Office Time: 50 min  Modifier needed: No  Episode visit count:  4     Preferred Name: Gaby    PERTINENT MEDICAL HISTORY     PMHX & Meds:   Past Medical History:   Diagnosis Date    Abnormal Papanicolaou smear of cervix 2004    repeat normal    Adverse effect of anesthesia 2017    woke up crying and hyperventilating and had to be re-sedated    Anemia     never required blood transfusions    Anxiety     Arthritis     Depression     managed with meds    Difficulty walking 02/2024    I stumble light dizziness    GERD (gastroesophageal reflux disease)     protonix prn, due to methotrexate    Headache 2013,    Herpes genitalia     History of chicken pox     Low back pain     Ra 2016    Memory disorder 01/2024    I forget things more often lately    Neuropathy 2013, 2016    Trigeminal neuralgia, optis neuritis    Obesity     BMI 33.13 - metformin and wagovy, denies Diabetes    Rheumatoid arthritis (HCC)    ,   Past Surgical History:   Procedure Laterality Date    ANKLE SURGERY Right 2020    CARPAL TUNNEL RELEASE Right 06/10/2024    CARPAL TUNNEL RELEASE ultra sound guided on the right performed by Crystal Reyes MD at [facility]    CARPAL TUNNEL RELEASE Left 6/24/2024    CARPAL TUNNEL RELEASE ultra sound guided on the left performed by Crystal Reyes MD

## 2024-07-30 ENCOUNTER — TREATMENT (OUTPATIENT)
Age: 44
End: 2024-07-30
Payer: COMMERCIAL

## 2024-07-30 DIAGNOSIS — R29.898 DECREASED GRIP STRENGTH: ICD-10-CM

## 2024-07-30 DIAGNOSIS — M79.642 PAIN IN BOTH HANDS: Primary | ICD-10-CM

## 2024-07-30 DIAGNOSIS — M79.641 PAIN IN BOTH HANDS: Primary | ICD-10-CM

## 2024-07-30 DIAGNOSIS — Z78.9 IMPAIRED MOBILITY AND ADLS: ICD-10-CM

## 2024-07-30 DIAGNOSIS — M25.641 STIFFNESS OF FINGER JOINT OF RIGHT HAND: ICD-10-CM

## 2024-07-30 DIAGNOSIS — Z74.09 IMPAIRED MOBILITY AND ADLS: ICD-10-CM

## 2024-07-30 PROCEDURE — 97022 WHIRLPOOL THERAPY: CPT | Performed by: OCCUPATIONAL THERAPIST

## 2024-07-30 PROCEDURE — 97110 THERAPEUTIC EXERCISES: CPT | Performed by: OCCUPATIONAL THERAPIST

## 2024-07-30 PROCEDURE — 97140 MANUAL THERAPY 1/> REGIONS: CPT | Performed by: OCCUPATIONAL THERAPIST

## 2024-07-30 NOTE — PROGRESS NOTES
[facility] OT [facility] ORTHOPAEDICS  [address] [place] [address]  Dept: [phone]      Occupational Therapy Daily Note     Referring MD: No ref. provider found    Diagnosis:     ICD-10-CM    1. Pain in both hands  M79.641     M79.642       2. Impaired mobility and ADLs  Z74.09     Z78.9       3. Decreased  strength  R29.898       4. Stiffness of finger joint of right hand  M25.641            Surgery/Medical Dx: L Carpal tunnel release 6/24/24 and R carpal tunnel release on 6/10/24    Therapy precautions: None    Payor: Payor: BCBS /  /  /  Billing pattern: Commercial- substantial/midpoint time each CPT  Total Direct Treatment Time: 40 min  Total In Office Time: 50 min  Modifier needed: No  Episode visit count:  5     Preferred Name: Gaby    PERTINENT MEDICAL HISTORY     PMHX & Meds:   Past Medical History:   Diagnosis Date    Abnormal Papanicolaou smear of cervix 2004    repeat normal    Adverse effect of anesthesia 2017    woke up crying and hyperventilating and had to be re-sedated    Anemia     never required blood transfusions    Anxiety     Arthritis     Depression     managed with meds    Difficulty walking 02/2024    I stumble light dizziness    GERD (gastroesophageal reflux disease)     protonix prn, due to methotrexate    Headache 2013,    Herpes genitalia     History of chicken pox     Low back pain     Ra 2016    Memory disorder 01/2024    I forget things more often lately    Neuropathy 2013, 2016    Trigeminal neuralgia, optis neuritis    Obesity     BMI 33.13 - metformin and wagovy, denies Diabetes    Rheumatoid arthritis (HCC)    ,   Past Surgical History:   Procedure Laterality Date    ANKLE SURGERY Right 2020    CARPAL TUNNEL RELEASE Right 06/10/2024    CARPAL TUNNEL RELEASE ultra sound guided on the right performed by Crystal Reyes MD at [facility]    CARPAL TUNNEL RELEASE Left 6/24/2024    CARPAL TUNNEL RELEASE ultra sound guided on the left performed by Crystal Reyes MD

## 2024-07-31 NOTE — PROGRESS NOTES
[facility] ORTHOPAEDICS  [address] [place] [address]  Dept: [phone]      Occupational Therapy Daily Note     Referring MD: Jinny Loen APRN - C*    Diagnosis:     ICD-10-CM    1. Pain in both hands  M79.641     M79.642       2. Impaired mobility and ADLs  Z74.09     Z78.9       3. Decreased  strength  R29.898       4. Stiffness of finger joint of right hand  M25.641            Surgery/Medical Dx: L Carpal tunnel release 6/24/24 and R carpal tunnel release on 6/10/24    Therapy precautions: None    Payor: Payor: BCBS /  /  /  Billing pattern: Commercial- substantial/midpoint time each CPT  Total Direct Treatment Time: 40 min  Total In Office Time: 50 min  Modifier needed: No  Episode visit count:  6     Preferred Name: Gaby    SUBJECTIVE     Pt reports she feels much better than she did on Tuesday. Pt continues to report she feels a \"raw/pebble\" sensation in her palms.     OBJECTIVE       Median nerve symptoms:  parasthesia not noted.  Pt reporting improvement of median nerve compression post surgery.  A/PROM Measures:    8/1/24\" Pt able to perform a full composite fist      Treatment:    Fluidotherapy (99991):  Therapist guided AROM in fluidotherapy for heat/ROM prior to exercise and manual intervention to increase available ROM and joint mobility  R hand     Therapeutic exercise (48477) x 30 min:  Yellow theraputty: rolling, pinching with R hand   Flexbar press yellow theraputty R and L   Isometric yellow theraband flexbar hold (bilateral)   Wrist strengthening with 1# DB:   Stop at neutral to limit pain with flexion   Forearm rotation with mallet, stop at neutral   Bilateral   Closed chain ball roll R hand   Upgrades to HEP to include core strengthening     Manual Therapy (90962) x 10  minutes: Addressing soft tissue/joint restrictions and swelling of  bilateral hands:    STM to the palm and wrist area in preparation for PROM and tissue lengthening of the wrist/forearm

## 2024-08-01 ENCOUNTER — TREATMENT (OUTPATIENT)
Age: 44
End: 2024-08-01
Payer: COMMERCIAL

## 2024-08-01 DIAGNOSIS — M79.641 PAIN IN BOTH HANDS: Primary | ICD-10-CM

## 2024-08-01 DIAGNOSIS — R29.898 DECREASED GRIP STRENGTH: ICD-10-CM

## 2024-08-01 DIAGNOSIS — M79.642 PAIN IN BOTH HANDS: Primary | ICD-10-CM

## 2024-08-01 DIAGNOSIS — Z78.9 IMPAIRED MOBILITY AND ADLS: ICD-10-CM

## 2024-08-01 DIAGNOSIS — Z74.09 IMPAIRED MOBILITY AND ADLS: ICD-10-CM

## 2024-08-01 DIAGNOSIS — M25.641 STIFFNESS OF FINGER JOINT OF RIGHT HAND: ICD-10-CM

## 2024-08-01 PROCEDURE — 97140 MANUAL THERAPY 1/> REGIONS: CPT | Performed by: OCCUPATIONAL THERAPIST

## 2024-08-01 PROCEDURE — 97110 THERAPEUTIC EXERCISES: CPT | Performed by: OCCUPATIONAL THERAPIST

## 2024-08-01 PROCEDURE — 97022 WHIRLPOOL THERAPY: CPT | Performed by: OCCUPATIONAL THERAPIST

## 2024-08-06 ENCOUNTER — TREATMENT (OUTPATIENT)
Age: 44
End: 2024-08-06
Payer: COMMERCIAL

## 2024-08-06 DIAGNOSIS — R29.898 DECREASED GRIP STRENGTH: ICD-10-CM

## 2024-08-06 DIAGNOSIS — M79.641 PAIN IN BOTH HANDS: Primary | ICD-10-CM

## 2024-08-06 DIAGNOSIS — M79.642 PAIN IN BOTH HANDS: Primary | ICD-10-CM

## 2024-08-06 DIAGNOSIS — Z78.9 IMPAIRED MOBILITY AND ADLS: ICD-10-CM

## 2024-08-06 DIAGNOSIS — Z74.09 IMPAIRED MOBILITY AND ADLS: ICD-10-CM

## 2024-08-06 DIAGNOSIS — M25.641 STIFFNESS OF FINGER JOINT OF RIGHT HAND: ICD-10-CM

## 2024-08-06 PROCEDURE — 97010 HOT OR COLD PACKS THERAPY: CPT | Performed by: OCCUPATIONAL THERAPIST

## 2024-08-06 PROCEDURE — 97035 APP MDLTY 1+ULTRASOUND EA 15: CPT | Performed by: OCCUPATIONAL THERAPIST

## 2024-08-06 PROCEDURE — 97110 THERAPEUTIC EXERCISES: CPT | Performed by: OCCUPATIONAL THERAPIST

## 2024-08-06 PROCEDURE — 97022 WHIRLPOOL THERAPY: CPT | Performed by: OCCUPATIONAL THERAPIST

## 2024-08-06 PROCEDURE — 97140 MANUAL THERAPY 1/> REGIONS: CPT | Performed by: OCCUPATIONAL THERAPIST

## 2024-08-06 NOTE — PROGRESS NOTES
improve ability to grasp and hold an object.  Patient will have at least 10 psi of 3 jaw denisse pinch strength affected hand to improve ability to pinch during ADL's like zippers, opening bags and turning a key.  Decrease pain in affected hand from 10 to 4 in order to sleep through the night and use affected hand with all ADL's.  Patient will have no visible edema in affected hand.   Pts Quick DASH functional assessment score will be less than 20% functional deficit.     [facility] Portal     OT Protocols

## 2024-08-07 NOTE — PROGRESS NOTES
[facility] ORTHOPAEDICS  [address] [place] [address]  Dept: [phone]      Occupational Therapy Daily Note     Referring MD: Jinny Leon APRN - C*    Diagnosis:     ICD-10-CM    1. Pain in both hands  M79.641     M79.642       2. Impaired mobility and ADLs  Z74.09     Z78.9       3. Decreased  strength  R29.898       4. Stiffness of finger joint of right hand  M25.641            Surgery/Medical Dx: L Carpal tunnel release 6/24/24 and R carpal tunnel release on 6/10/24    Therapy precautions: None    Payor: Payor: BCBS /  /  /  Billing pattern: Commercial- substantial/midpoint time each CPT  Total Direct Treatment Time:  40 min  Total In Office Time: 50 min  Modifier needed: No  Episode visit count:  8     Preferred Name: Gaby    SUBJECTIVE     Pt reports much improved pain in her hands today from previous visit.     OBJECTIVE       Median nerve symptoms:  parasthesia not noted.  Pt reporting improvement of median nerve compression post surgery.  A/PROM Measures:    8/1/24\" Pt able to perform a full composite fist     8/6/24: Able to make a fist, not a tight fist     Treatment:    Fluidotherapy (77594):  Therapist guided AROM in fluidotherapy for heat/ROM prior to exercise and manual intervention to increase available ROM and joint mobility  L hand     Therapeutic exercise (33952) x 30 min:  Declining dowel press yellow theraputty R/L  Flexbar roll \"massage\" on palms for desensitization and scar massage     Manual Therapy (94001) x 10  minutes: Addressing soft tissue/joint restrictions and swelling of  bilateral hands:    STM to the palm and wrist area in preparation for PROM and tissue lengthening of the wrist/forearm components  Scar mobilization by therapist R/L hands   Use of graston tool to bilateral palms       Access Code: [email or web address]  URL: [email or web address]  Date: 08/06/2024  Prepared by: Nica Lo    Exercises  - Standing Pec Stretch at Wall  - 2 x

## 2024-08-08 ENCOUNTER — TREATMENT (OUTPATIENT)
Age: 44
End: 2024-08-08
Payer: COMMERCIAL

## 2024-08-08 DIAGNOSIS — M79.641 PAIN IN BOTH HANDS: Primary | ICD-10-CM

## 2024-08-08 DIAGNOSIS — M25.641 STIFFNESS OF FINGER JOINT OF RIGHT HAND: ICD-10-CM

## 2024-08-08 DIAGNOSIS — R29.898 DECREASED GRIP STRENGTH: ICD-10-CM

## 2024-08-08 DIAGNOSIS — Z74.09 IMPAIRED MOBILITY AND ADLS: ICD-10-CM

## 2024-08-08 DIAGNOSIS — Z78.9 IMPAIRED MOBILITY AND ADLS: ICD-10-CM

## 2024-08-08 DIAGNOSIS — M79.642 PAIN IN BOTH HANDS: Primary | ICD-10-CM

## 2024-08-08 PROCEDURE — 97140 MANUAL THERAPY 1/> REGIONS: CPT | Performed by: OCCUPATIONAL THERAPIST

## 2024-08-08 PROCEDURE — 97110 THERAPEUTIC EXERCISES: CPT | Performed by: OCCUPATIONAL THERAPIST

## 2024-08-08 PROCEDURE — 97022 WHIRLPOOL THERAPY: CPT | Performed by: OCCUPATIONAL THERAPIST

## 2024-08-09 RX ORDER — FLUCONAZOLE 150 MG/1
150 TABLET ORAL ONCE
Qty: 1 TABLET | Refills: 0 | Status: SHIPPED | OUTPATIENT
Start: 2024-08-09 | End: 2024-08-09

## 2024-08-09 NOTE — TELEPHONE ENCOUNTER
Pt lvm requesting Diflucan. Called pt back, informed pt I spoke to Dr. Bosch and he will send in a Diflucan for her. Pt voiced understanding.     Rx pend

## 2024-08-13 ENCOUNTER — TREATMENT (OUTPATIENT)
Age: 44
End: 2024-08-13
Payer: COMMERCIAL

## 2024-08-13 DIAGNOSIS — M25.641 STIFFNESS OF FINGER JOINT OF RIGHT HAND: ICD-10-CM

## 2024-08-13 DIAGNOSIS — Z78.9 IMPAIRED MOBILITY AND ADLS: ICD-10-CM

## 2024-08-13 DIAGNOSIS — Z74.09 IMPAIRED MOBILITY AND ADLS: ICD-10-CM

## 2024-08-13 DIAGNOSIS — M79.641 PAIN IN BOTH HANDS: Primary | ICD-10-CM

## 2024-08-13 DIAGNOSIS — R29.898 DECREASED GRIP STRENGTH: ICD-10-CM

## 2024-08-13 DIAGNOSIS — M79.642 PAIN IN BOTH HANDS: Primary | ICD-10-CM

## 2024-08-13 PROCEDURE — 97140 MANUAL THERAPY 1/> REGIONS: CPT | Performed by: OCCUPATIONAL THERAPIST

## 2024-08-13 PROCEDURE — 97022 WHIRLPOOL THERAPY: CPT | Performed by: OCCUPATIONAL THERAPIST

## 2024-08-13 PROCEDURE — 97110 THERAPEUTIC EXERCISES: CPT | Performed by: OCCUPATIONAL THERAPIST

## 2024-08-13 NOTE — PROGRESS NOTES
[facility] ORTHOPAEDICS  [address] [place] [address]  Dept: [phone]      Occupational Therapy Daily Note     Referring MD: Jinny Leon APRN - C*    Diagnosis:     ICD-10-CM    1. Pain in both hands  M79.641     M79.642       2. Impaired mobility and ADLs  Z74.09     Z78.9       3. Decreased  strength  R29.898       4. Stiffness of finger joint of right hand  M25.641            Surgery/Medical Dx: L Carpal tunnel release 6/24/24 and R carpal tunnel release on 6/10/24    Therapy precautions: None    Payor: Payor: BCBS /  /  /  Billing pattern: Commercial- substantial/midpoint time each CPT  Total Direct Treatment Time:  40 min  Total In Office Time: 50 min  Modifier needed: No  Episode visit count:  9     Preferred Name: Gaby    SUBJECTIVE     Pt states that she was able to pump gas with both hands without pain this morning.     OBJECTIVE       Median nerve symptoms:  parasthesia not noted.  Pt reporting improvement of median nerve compression post surgery.  A/PROM Measures:    8/1/24\" Pt able to perform a full composite fist     8/6/24: Able to make a fist, not a tight fist     Treatment:    Fluidotherapy (71921):  Therapist guided AROM in fluidotherapy for heat/ROM prior to exercise and manual intervention to increase available ROM and joint mobility  L hand   Paraffin to L hand     Therapeutic exercise (62833) x 30 min:  Declining dowel press yellow theraputty R/L  Flexbar roll \"massage\" on palms for desensitization and scar massage   Yellow theraband flexbar: sup/pro, radial deviation 2x15   Forearm rotation with mallet, stop at neutral   Bilateral     Manual Therapy (36749) x 10  minutes: Addressing soft tissue/joint restrictions and swelling of  bilateral hands:    STM to the palm and wrist area in preparation for PROM and tissue lengthening of the wrist/forearm components  Scar mobilization by therapist R/L hands   Pt tolerate increased pressure   Use of graston tool

## 2024-08-13 NOTE — PROGRESS NOTES
[facility] MEDICINE NUTRITION REASSESSMENT     Assessment:    Pt reports good dietary compliance since last office visit. Pt is eating at least 3x/d. Pt is drinking water and espresso. Pt is exercising via walking and stepper 3 x weekly.   Diet Recall:              Breakfast: Turkey, chili, black beans              Lunch: Eggs, turkey sausage, [place] toast              Dinner: Brown rice, black beans, veggies     Intervention:   Evaluated diet recall   Encouraged continued activity.          Monitoring and Evaluation:  Monitor for continued safe, supervised weight loss for OM.    F/U per MD Nadine Simpson MBA. RD, LD    
serious comorbidity and body mass index (BMI) of 40.0 to 44.9 in adult (HCC)        4. Nutritional counseling                PLAN:     1.         Patient is an excellent candidate with a clear medical necessity for weight loss.     2.         We discussed current medication. I went over risks and benefits of the medication at length.     Metformin increases glucose uptake in muscle and helps block glucose absorption in intestine. Side effects include lactic acidosis, nausea, vomiting, diarrhea, abdominal pain and altered taste.    Semaglutide helps decrease hunger, increase energy and metabolic rate. Side effects include nausea, vomiting, diarrhea, abdominal pain and pancreatitis.      3.         Encouraged patient to participate in our Bariatric Support Group.     4.         Advised that weight loss medication is only one part of a lifelong weight management program, and that sustainable weight loss will only come with major changes in eating habits, physical activity and behavior. Advised that obesity is a chronic disease and requires a commitment to self-management and long-term follow up.     5.         Nutrition Recommendations: please see dietitian notes.     6.         Exercise Recommendations: Exercise routine, incorporating both cardio and strength training, building up to 5x/wk for at least 300 minutes a week.       Scripts: Metformin 1000 mg at night, Wegovy 2.4 mg    Severe pelvic pain while taking Orlistat so we had to discontinued. She cannot take Qsymia due to her sever anxiety and current medications.       FU 3 months - RD + MD      Time: I spent 40 minutes preparing to see patient (including chart review and preparation), obtaining and/or reviewing additional medical history, performing a physical exam and evaluation, documenting clinical information in the electronic health record, independently interpreting results, communicating results to patient, family or caregiver, and/or coordinating

## 2024-08-14 ENCOUNTER — OFFICE VISIT (OUTPATIENT)
Dept: SURGERY | Age: 44
End: 2024-08-14
Payer: COMMERCIAL

## 2024-08-14 VITALS
SYSTOLIC BLOOD PRESSURE: 126 MMHG | WEIGHT: 184 LBS | BODY MASS INDEX: 31.41 KG/M2 | HEIGHT: 64 IN | HEART RATE: 88 BPM | DIASTOLIC BLOOD PRESSURE: 74 MMHG

## 2024-08-14 DIAGNOSIS — E66.01 CLASS 3 SEVERE OBESITY DUE TO EXCESS CALORIES WITH SERIOUS COMORBIDITY AND BODY MASS INDEX (BMI) OF 40.0 TO 44.9 IN ADULT (HCC): ICD-10-CM

## 2024-08-14 DIAGNOSIS — K21.9 GASTROESOPHAGEAL REFLUX DISEASE, UNSPECIFIED WHETHER ESOPHAGITIS PRESENT: ICD-10-CM

## 2024-08-14 DIAGNOSIS — E65 ABDOMINAL OBESITY: Primary | ICD-10-CM

## 2024-08-14 DIAGNOSIS — Z71.3 NUTRITIONAL COUNSELING: ICD-10-CM

## 2024-08-14 PROCEDURE — 99215 OFFICE O/P EST HI 40 MIN: CPT | Performed by: SURGERY

## 2024-08-14 RX ORDER — SEMAGLUTIDE 2.4 MG/.75ML
2.4 INJECTION, SOLUTION SUBCUTANEOUS
Qty: 3 ML | Refills: 2 | Status: SHIPPED | OUTPATIENT
Start: 2024-08-14 | End: 2024-11-12

## 2024-08-15 ENCOUNTER — TREATMENT (OUTPATIENT)
Age: 44
End: 2024-08-15
Payer: COMMERCIAL

## 2024-08-15 DIAGNOSIS — M25.641 STIFFNESS OF FINGER JOINT OF RIGHT HAND: ICD-10-CM

## 2024-08-15 DIAGNOSIS — M79.642 PAIN IN BOTH HANDS: Primary | ICD-10-CM

## 2024-08-15 DIAGNOSIS — Z78.9 IMPAIRED MOBILITY AND ADLS: ICD-10-CM

## 2024-08-15 DIAGNOSIS — R29.898 DECREASED GRIP STRENGTH: ICD-10-CM

## 2024-08-15 DIAGNOSIS — M79.641 PAIN IN BOTH HANDS: Primary | ICD-10-CM

## 2024-08-15 DIAGNOSIS — Z74.09 IMPAIRED MOBILITY AND ADLS: ICD-10-CM

## 2024-08-15 PROCEDURE — 97140 MANUAL THERAPY 1/> REGIONS: CPT | Performed by: OCCUPATIONAL THERAPIST

## 2024-08-15 PROCEDURE — 97022 WHIRLPOOL THERAPY: CPT | Performed by: OCCUPATIONAL THERAPIST

## 2024-08-15 PROCEDURE — 97110 THERAPEUTIC EXERCISES: CPT | Performed by: OCCUPATIONAL THERAPIST

## 2024-08-15 NOTE — PROGRESS NOTES
components  Scar mobilization by therapist R/L hands   Use of graston tool to bilateral palms       Access Code: [email or web address]  URL: [email or web address]  Date: 08/06/2024  Prepared by: Nica Lo    Exercises  - Standing Pec Stretch at Wall  - 2 x daily - 7 x weekly - 1 sets - 3 reps - 15 seconds hold  - Prone W Scapular Retraction  - 2 x daily - 7 x weekly - 2 sets - 15 reps  - Prone Scapular Retraction  - 2 x daily - 7 x weekly - 2 sets - 15 reps    CLINICAL DECISION MAKING/ASSESSMENT     Education on progression (slow) into increased activity and light strengthening.     PLAN OF CARE     Strengthening as tolerated (hand, elbow, scapular as part of HEP)  Scar massage, cont education on energy conservation per RA       GOALS     Short term goals:  8/6/2024  (4 weeks)  Patient will be I with HEP. MET  Pt will have minimal edema to affected hand and wrist.   Mostly met   Increase AROM affected wrist extension/flexion to WFL to improve function with dressing and bathing. MET  Pt will be able to make a full composite fist with the affected hand so that the pt is able to grasp and hold objects during self care. MET  Improve Quick DASH functional assessment score from 77% deficit to less than 40% deficit.     Long term goals:  9/7/2024 (60 days)   Patient will have full thumb opposition for all ADL's/IADL's without difficulty.  Patient will have at least 40 psi of  strength bilaterally to improve ability to grasp and hold an object.  Patient will have at least 10 psi of 3 jaw denisse pinch strength affected hand to improve ability to pinch during ADL's like zippers, opening bags and turning a key.  Decrease pain in affected hand from 10 to 4 in order to sleep through the night and use affected hand with all ADL's.  Patient will have no visible edema in affected hand.   Pts Quick DASH functional assessment score will be less than 20% functional deficit.     [facility]     OT Protocols

## 2024-08-20 ENCOUNTER — OFFICE VISIT (OUTPATIENT)
Age: 44
End: 2024-08-20

## 2024-08-20 ENCOUNTER — TREATMENT (OUTPATIENT)
Age: 44
End: 2024-08-20
Payer: COMMERCIAL

## 2024-08-20 DIAGNOSIS — M18.11 ARTHRITIS OF CARPOMETACARPAL (CMC) JOINT OF RIGHT THUMB: ICD-10-CM

## 2024-08-20 DIAGNOSIS — Z74.09 IMPAIRED MOBILITY AND ADLS: ICD-10-CM

## 2024-08-20 DIAGNOSIS — R29.898 DECREASED GRIP STRENGTH: ICD-10-CM

## 2024-08-20 DIAGNOSIS — G56.02 LEFT CARPAL TUNNEL SYNDROME: Primary | ICD-10-CM

## 2024-08-20 DIAGNOSIS — G56.01 RIGHT CARPAL TUNNEL SYNDROME: ICD-10-CM

## 2024-08-20 DIAGNOSIS — M05.9 RHEUMATOID ARTHRITIS WITH POSITIVE RHEUMATOID FACTOR, INVOLVING UNSPECIFIED SITE (HCC): ICD-10-CM

## 2024-08-20 DIAGNOSIS — M79.641 PAIN IN BOTH HANDS: Primary | ICD-10-CM

## 2024-08-20 DIAGNOSIS — Z78.9 IMPAIRED MOBILITY AND ADLS: ICD-10-CM

## 2024-08-20 DIAGNOSIS — M79.642 PAIN IN BOTH HANDS: Primary | ICD-10-CM

## 2024-08-20 PROCEDURE — 97110 THERAPEUTIC EXERCISES: CPT | Performed by: OCCUPATIONAL THERAPIST

## 2024-08-20 PROCEDURE — 99024 POSTOP FOLLOW-UP VISIT: CPT | Performed by: ORTHOPAEDIC SURGERY

## 2024-08-20 PROCEDURE — 97140 MANUAL THERAPY 1/> REGIONS: CPT | Performed by: OCCUPATIONAL THERAPIST

## 2024-08-20 NOTE — PROGRESS NOTES
[facility] OT [facility]  [address] [place] [id number]  Dept: [phone]      Occupational Therapy Daily Note     Referring MD: Jinny Leon APRN - C*    Diagnosis:     ICD-10-CM    1. Pain in both hands  M79.641     M79.642       2. Impaired mobility and ADLs  Z74.09     Z78.9       3. Decreased  strength  R29.898            Surgery/Medical Dx: L Carpal tunnel release 6/24/24 and R carpal tunnel release on 6/10/24    Therapy precautions: None    Payor: Payor: BCBS /  /  /  Billing pattern: Commercial- substantial/midpoint time each CPT  Total Direct Treatment Time:  40 min  Total In Office Time: 45 min  Modifier needed: No  Episode visit count:  11     Preferred Name: Gaby    SUBJECTIVE     Pt reports she lifted weighted with cables yesterday, felt pain with bicep curl due to the pressure placed on her palm. She was able to do tricep extension without any pain.     OBJECTIVE         Strength  Strength (psi): RIGHT  8/20/24 LEFT  8/20/24      Position II 30.8 21.1     Lat Pinch: 7 7     3pt pinch: 5 8          Treatment:      Therapeutic exercise (05551) x 30 min:  Declining dowel press red theraputty R/L  Red theraputty: gripping, squeezing, rollingn   Wrist strengthening with 2# DB: 2x15 each hand   Stop at neutral to limit pain with flexion     Manual Therapy (03540) x 10  minutes: Addressing soft tissue/joint restrictions and swelling of  bilateral hands:    Use of graston tool to bilateral palms       Access Code: [email or web address]  URL: [email or web address]  Date: 08/06/2024  Prepared by: Nica Lo    Exercises  - Standing Pec Stretch at Wall  - 2 x daily - 7 x weekly - 1 sets - 3 reps - 15 seconds hold  - Prone W Scapular Retraction  - 2 x daily - 7 x weekly - 2 sets - 15 reps  - Prone Scapular Retraction  - 2 x daily - 7 x weekly - 2 sets - 15 reps    CLINICAL DECISION MAKING/ASSESSMENT     Pt progressing with strength bilaterally. She is slowly returning

## 2024-08-20 NOTE — PROGRESS NOTES
Orthopaedic Hand Surgery Note    Name: Gaby Dinh  Age: 43 y.o.  YOB: 1980  Gender: female  MRN: [id number]    Post Operative Visit: CARPAL TUNNEL RELEASE ultra sound guided on the left - Left    HPI: Patient is status post CARPAL TUNNEL RELEASE ultra sound guided on the left - Left on 6/24/2024. Patient reports ongoing palmar pain but much improved than before, she has been able to do many more different gym activities without much pain but she still has difficulty with certain things such as dumbbells.    Physical Examination:  Well-healed surgical wounds. Sensation is intact in all fingers. Motor exam reveals no deficits.  Tenderness palpation of the right thumb CMC joint, moderate tenderness with patient along the palm bilaterally, she has great wrist extension but somewhat limited wrist flexion due to pain, she has some bogginess and tenderness along the fourth extensor compartment, likely related to rheumatoid synovitis.    Imaging:     none    Assessment:   1. Left carpal tunnel syndrome    2. Right carpal tunnel syndrome    3. Arthritis of carpometacarpal (CMC) joint of right thumb    4. Rheumatoid arthritis with positive rheumatoid factor, involving unspecified site (HCC)         Status post CARPAL TUNNEL RELEASE ultra sound guided on the left - Left on 6/24/2024    Plan:  We discussed the post operative course and progression.  Continued therapy, I offered a right thumb CMC joint steroid injection but the patient politely declined, I will reassess on an as-needed basis    Crystal Reyes MD  08/20/24  10:56 AM

## 2024-08-22 ENCOUNTER — TREATMENT (OUTPATIENT)
Age: 44
End: 2024-08-22
Payer: COMMERCIAL

## 2024-08-22 DIAGNOSIS — Z78.9 IMPAIRED MOBILITY AND ADLS: ICD-10-CM

## 2024-08-22 DIAGNOSIS — R29.898 DECREASED GRIP STRENGTH: ICD-10-CM

## 2024-08-22 DIAGNOSIS — M25.641 STIFFNESS OF FINGER JOINT OF RIGHT HAND: ICD-10-CM

## 2024-08-22 DIAGNOSIS — M79.641 PAIN IN BOTH HANDS: Primary | ICD-10-CM

## 2024-08-22 DIAGNOSIS — Z74.09 IMPAIRED MOBILITY AND ADLS: ICD-10-CM

## 2024-08-22 DIAGNOSIS — M79.642 PAIN IN BOTH HANDS: Primary | ICD-10-CM

## 2024-08-22 PROCEDURE — 97110 THERAPEUTIC EXERCISES: CPT | Performed by: OCCUPATIONAL THERAPIST

## 2024-08-22 PROCEDURE — 97022 WHIRLPOOL THERAPY: CPT | Performed by: OCCUPATIONAL THERAPIST

## 2024-08-22 NOTE — PROGRESS NOTES
[facility] OT [facility]  [address] [place] [id number]  Dept: [phone]      Occupational Therapy Daily Note     Referring MD: Jinny Leon APRN - C*    Diagnosis:     ICD-10-CM    1. Pain in both hands  M79.641     M79.642       2. Impaired mobility and ADLs  Z74.09     Z78.9       3. Decreased  strength  R29.898       4. Stiffness of finger joint of right hand  M25.641            Surgery/Medical Dx: L Carpal tunnel release 6/24/24 and R carpal tunnel release on 6/10/24    Therapy precautions: None    Payor: Payor: BCBS /  /  /  Billing pattern: Commercial- substantial/midpoint time each CPT  Total Direct Treatment Time:  40 min  Total In Office Time: 50 min  Modifier needed: No  Episode visit count:  12     Preferred Name: Gaby    SUBJECTIVE     Pt states she is still in the pool. Pt states her two main goals are to be able to open water bottles and be able to curl 30 pounds in the gym by November.     OBJECTIVE         Strength  Strength (psi): RIGHT  8/20/24 LEFT  8/20/24      Position II 30.8 21.1     Lat Pinch: 7 7     3pt pinch: 5 8          Treatment:      Fluidotherapy (12151):  Therapist guided AROM in fluidotherapy for heat/ROM prior to exercise and manual intervention to increase available ROM and joint mobility  R hand     Therapeutic exercise (66412) x 40 min:  IHM foam sponges with squeeze   STM bilaterally with graston tool   Red theraputty: gripping, squeezing, rollingn   Wrist strengthening with 3# DB: 2x15 each hand   Trial calibrated gripper, pt unable to perform more than a few grasps due to pain         Access Code: [email or web address]  URL: [email or web address]  Date: 08/06/2024  Prepared by: Nica Lo    Exercises  - Standing Pec Stretch at Wall  - 2 x daily - 7 x weekly - 1 sets - 3 reps - 15 seconds hold  - Prone W Scapular Retraction  - 2 x daily - 7 x weekly - 2 sets - 15 reps  - Prone Scapular Retraction  - 2 x daily - 7 x weekly - 2

## 2024-09-03 ENCOUNTER — PATIENT MESSAGE (OUTPATIENT)
Dept: ORTHOPEDIC SURGERY | Age: 44
End: 2024-09-03

## 2024-09-04 ENCOUNTER — TELEPHONE (OUTPATIENT)
Dept: ORTHOPEDIC SURGERY | Age: 44
End: 2024-09-04

## 2024-09-04 DIAGNOSIS — G56.02 LEFT CARPAL TUNNEL SYNDROME: Primary | ICD-10-CM

## 2024-09-04 NOTE — TELEPHONE ENCOUNTER
Appointment Request From: Gaby Dinh     With Provider: Dr. Haroon Soto [facility]     Preferred Date Range: 9/4/2024 - 9/11/2024     Preferred Times: Any Time     Reason for visit: Request an Appointment     Comments:  Continue therapy

## 2024-09-09 ENCOUNTER — OFFICE VISIT (OUTPATIENT)
Dept: OBGYN CLINIC | Age: 44
End: 2024-09-09
Payer: COMMERCIAL

## 2024-09-09 VITALS
DIASTOLIC BLOOD PRESSURE: 62 MMHG | HEIGHT: 64 IN | WEIGHT: 189 LBS | SYSTOLIC BLOOD PRESSURE: 118 MMHG | BODY MASS INDEX: 32.27 KG/M2

## 2024-09-09 DIAGNOSIS — K59.09 OTHER CONSTIPATION: ICD-10-CM

## 2024-09-09 DIAGNOSIS — K64.8 OTHER HEMORRHOIDS: ICD-10-CM

## 2024-09-09 DIAGNOSIS — Z01.419 WOMEN'S ANNUAL ROUTINE GYNECOLOGICAL EXAMINATION: Primary | ICD-10-CM

## 2024-09-09 PROBLEM — N89.8 VAGINAL DISCHARGE: Status: RESOLVED | Noted: 2023-10-10 | Resolved: 2024-09-09

## 2024-09-09 PROBLEM — Z75.8 DOES NOT HAVE PRIMARY CARE PROVIDER: Status: RESOLVED | Noted: 2024-07-09 | Resolved: 2024-09-09

## 2024-09-09 PROCEDURE — 99396 PREV VISIT EST AGE 40-64: CPT | Performed by: OBSTETRICS & GYNECOLOGY

## 2024-09-09 PROCEDURE — 99459 PELVIC EXAMINATION: CPT | Performed by: OBSTETRICS & GYNECOLOGY

## 2024-09-09 PROCEDURE — 99213 OFFICE O/P EST LOW 20 MIN: CPT | Performed by: OBSTETRICS & GYNECOLOGY

## 2024-09-09 RX ORDER — CICLOPIROX 1 G/100ML
1 SHAMPOO TOPICAL
COMMUNITY

## 2024-09-09 RX ORDER — BETAMETHASONE DIPROPIONATE 0.5 MG/G
1 LOTION TOPICAL
COMMUNITY

## 2024-09-09 RX ORDER — HYDROCORTISONE ACETATE 25 MG/1
25 SUPPOSITORY RECTAL 2 TIMES DAILY PRN
Qty: 40 SUPPOSITORY | Refills: 1 | Status: SHIPPED | OUTPATIENT
Start: 2024-09-09 | End: 2024-09-13

## 2024-09-13 ENCOUNTER — TELEPHONE (OUTPATIENT)
Dept: OBGYN CLINIC | Age: 44
End: 2024-09-13

## 2024-09-13 DIAGNOSIS — K64.8 OTHER HEMORRHOIDS: Primary | ICD-10-CM

## 2024-09-13 RX ORDER — HYDROCORTISONE 25 MG/G
CREAM TOPICAL
Qty: 28 G | Refills: 0 | Status: SHIPPED | OUTPATIENT
Start: 2024-09-13

## 2024-09-18 ENCOUNTER — TELEPHONE (OUTPATIENT)
Age: 44
End: 2024-09-18

## 2024-09-18 DIAGNOSIS — M79.641 PAIN IN BOTH HANDS: Primary | ICD-10-CM

## 2024-09-18 DIAGNOSIS — M79.642 PAIN IN BOTH HANDS: Primary | ICD-10-CM

## 2024-10-01 RX ORDER — SEMAGLUTIDE 2.4 MG/.75ML
2.4 INJECTION, SOLUTION SUBCUTANEOUS
Qty: 3 ML | Refills: 2 | OUTPATIENT
Start: 2024-10-01 | End: 2024-12-30

## 2024-10-09 PROBLEM — Z01.419 WOMEN'S ANNUAL ROUTINE GYNECOLOGICAL EXAMINATION: Status: RESOLVED | Noted: 2023-09-05 | Resolved: 2024-10-09

## 2024-10-29 ENCOUNTER — HOSPITAL ENCOUNTER (OUTPATIENT)
Age: 44
Discharge: HOME | End: 2024-10-31
Payer: COMMERCIAL

## 2024-10-29 DIAGNOSIS — M25.561 RIGHT KNEE PAIN, UNSPECIFIED CHRONICITY: ICD-10-CM

## 2024-10-29 PROCEDURE — 73721 MRI JNT OF LWR EXTRE W/O DYE: CPT

## 2024-11-14 ENCOUNTER — OFFICE VISIT (OUTPATIENT)
Dept: SURGERY | Age: 44
End: 2024-11-14
Payer: COMMERCIAL

## 2024-11-14 VITALS
DIASTOLIC BLOOD PRESSURE: 81 MMHG | HEIGHT: 64 IN | SYSTOLIC BLOOD PRESSURE: 142 MMHG | HEART RATE: 67 BPM | BODY MASS INDEX: 33.97 KG/M2 | WEIGHT: 199 LBS

## 2024-11-14 DIAGNOSIS — K21.9 GASTROESOPHAGEAL REFLUX DISEASE, UNSPECIFIED WHETHER ESOPHAGITIS PRESENT: ICD-10-CM

## 2024-11-14 DIAGNOSIS — Z71.3 NUTRITIONAL COUNSELING: ICD-10-CM

## 2024-11-14 DIAGNOSIS — E66.813 CLASS 3 SEVERE OBESITY DUE TO EXCESS CALORIES WITH SERIOUS COMORBIDITY AND BODY MASS INDEX (BMI) OF 40.0 TO 44.9 IN ADULT: ICD-10-CM

## 2024-11-14 DIAGNOSIS — E66.01 CLASS 3 SEVERE OBESITY DUE TO EXCESS CALORIES WITH SERIOUS COMORBIDITY AND BODY MASS INDEX (BMI) OF 40.0 TO 44.9 IN ADULT: ICD-10-CM

## 2024-11-14 DIAGNOSIS — E65 ABDOMINAL OBESITY: Primary | ICD-10-CM

## 2024-11-14 DIAGNOSIS — M05.9 RHEUMATOID ARTHRITIS WITH POSITIVE RHEUMATOID FACTOR, INVOLVING UNSPECIFIED SITE (HCC): ICD-10-CM

## 2024-11-14 PROCEDURE — 99215 OFFICE O/P EST HI 40 MIN: CPT | Performed by: SURGERY

## 2024-11-14 RX ORDER — SEMAGLUTIDE 2.4 MG/.75ML
2.4 INJECTION, SOLUTION SUBCUTANEOUS
Qty: 3 ML | Refills: 2 | Status: SHIPPED | OUTPATIENT
Start: 2024-11-14 | End: 2025-02-12

## 2024-11-14 NOTE — PROGRESS NOTES
OBESITY MEDICINE NUTRITION REASSESSMENT     Assessment:    Pt reports struggling with dietary compliance since last office visit - pt reports increased life stress. Pt is sometimes eating at least 3x/d. Pt is drinking water and coffee. Pt is currently not exercising - plans to restart   Diet Recall:              Breakfast: Beef jennifer              Lunch: Eggs and penn              Dinner: Pancake     Intervention:   Evaluated diet recall and identified modifications.  Encouraged pt to really focus on diet - even in stress - encouraged focus on lean protein  Encouraged pt to restart their previous activity.          Monitoring and Evaluation:  Monitor for continued safe, supervised weight loss for OM.    F/U per MD Nadine Simpson MBA. RD, LD    
by mouth       No current facility-administered medications for this visit.        ALLERGIES:  Allergies   Allergen Reactions    Gabapentin Hives and Swelling    Methotrexate Other (See Comments)     Heart burn     Tramadol Other (See Comments)     Extreme stomach pains        ROS: The patient has no difficulty with chest pain or shortness of breath.  No fever or chills.  Comprehensive 13 point review of systems was otherwise unremarkable except as noted above.    Physical Exam:     BP (!) 142/81   Pulse 67   Ht 1.613 m (5' 3.5\")   Wt 90.3 kg (199 lb)   BMI 34.70 kg/m²       General:  Well-developed, well-nourished, no distress.  Psych:  Cooperative, good insight and judgement.  Neuro:  Alert, oriented to person, place and time.  HEENT:  Normocephalic, atraumatic. Sclera clear.  Lungs:  Unlabored breathing. Symmetrical chest expansion.   Chest wall:  No tenderness or deformity.  Heart:  Regular rate and rhythm. No JVD.  Extremities:  Extremities normal, atraumatic, no cyanosis or edema.  Skin:  Skin color, texture, turgor normal. No rashes.    ASSESSMENT:  Morbid obesity with a Body mass index is 34.7 kg/m². during multi-disciplinary weight loss prep program.    Pt notes no changes to comorbid conditions and no side effects to current medication regimen.     OM History   Metformin 500 mg BID - 1/9/2024  Metformin 1000 mg BID - 2/8/2024   Orlistat 120 mg TID - 2/8/2024 - stopped due to stomach being heavy  Wegovy 0.25 mg weekly 4/18/2024  Wegovy 0.5 mg weekly 5/16/2024  Wegovy 1 mg weekly 6/12/2024  Wegovy 1.7 mg 7/11/2024  Wegovy 2.4 mg 8/14/2024       Diagnosis Orders   1. Abdominal obesity        2. Gastroesophageal reflux disease, unspecified whether esophagitis present        3. Class 3 severe obesity due to excess calories with serious comorbidity and body mass index (BMI) of 40.0 to 44.9 in adult        4. Rheumatoid arthritis with positive rheumatoid factor, involving unspecified site ([facility])        5.

## 2025-02-06 NOTE — PROGRESS NOTES
Emi Otero MD   Bariatric & Advanced Laparoscopic Surgery & Endoscopy  [address], [address]  [place]  [address]                             Office [phone] Fax [phone]        Date of visit: 2/12/2025      History and Physical    Patient: Gaby Dinh MRN: [id number]     YOB: 1980  Age: 44 y.o.  Sex: female              PCP: Jinny Leon APRN - CNP   Pharmacy:   [facility] [address] [place] [address] -  [phone] - F [phone]  [address] [place] [address]  Phone: [phone] Fax: [phone]     Date:  2/12/2025        13 month(s) post medication start  She has lost,  37 lbs. since her last office visit.    How is patient taking medication? Metformin 1000 mg BID, Wegovy 2.4 mg          Clinical Assessment and Physical Exam:    she is doing very well today and is feeling good. She reports that she has been adhering to the protein first diet without difficulty.  She denies any tachycardia, abdominal pain, nausea or vomiting.  She does report having problems with constipation.     Protein:  80 grams per day  Fluids:    64 ounces per day  Physical Activity:  walking and swimming    Initial Consult Date 10/25/2023   Initial Weight 245   Ideal Body Weight 143   Initial BMI 42.72   Initial Neck Circumference 15   Initial Waist Circumference 48.5   Initial Hip Circumference 51   Initial BF% 50.6       Medication See history below   Medication Start Date 1/9/2024       Today's Weight 73.5 kg (162 lb)   Today's BMI Body mass index is 28.25 kg/m².    Today's Neck Circumference 12.5   Today's Waist 36   Today's Hip 41.5   Today's BF% 41.2       Evaluation of Co-morbid Conditions  Sleep Apnea No, uses CPAP- NA   Diabetes No, insulin dependent- NA   Hypertension No, number of medications- 0   GERD RS   Hyperlipidemia No        MEDICATIONS:  Current Outpatient Medications   Medication Sig Dispense Refill

## 2025-02-11 NOTE — PROGRESS NOTES
OBESITY MEDICINE NUTRITION REASSESSMENT     Assessment:    Pt reports good dietary compliance since last office visit. Pt is eating at least 3x/d. Pt is drinking water. Pt is exercising via walking and swimming for 2hrs 4 x weekly.   Diet Recall:              Breakfast: Greek yogurt and kiwi              Lunch: Sirloin,brown rice, and kale              Dinner: Grilled chx, rice, peans     Intervention:   Evaluated diet recall   Encouraged continued activity.          Monitoring and Evaluation:  Monitor for continued safe, supervised weight loss for OM.    F/U per MD Nadine Simpson MBA. RD, LD

## 2025-02-12 ENCOUNTER — OFFICE VISIT (OUTPATIENT)
Dept: SURGERY | Age: 45
End: 2025-02-12
Payer: COMMERCIAL

## 2025-02-12 VITALS
BODY MASS INDEX: 27.66 KG/M2 | HEIGHT: 64 IN | HEART RATE: 71 BPM | WEIGHT: 162 LBS | DIASTOLIC BLOOD PRESSURE: 77 MMHG | SYSTOLIC BLOOD PRESSURE: 110 MMHG

## 2025-02-12 DIAGNOSIS — K21.9 GASTROESOPHAGEAL REFLUX DISEASE, UNSPECIFIED WHETHER ESOPHAGITIS PRESENT: Primary | ICD-10-CM

## 2025-02-12 DIAGNOSIS — Z71.3 NUTRITIONAL COUNSELING: ICD-10-CM

## 2025-02-12 DIAGNOSIS — E66.01 CLASS 3 SEVERE OBESITY DUE TO EXCESS CALORIES WITH SERIOUS COMORBIDITY AND BODY MASS INDEX (BMI) OF 40.0 TO 44.9 IN ADULT: ICD-10-CM

## 2025-02-12 DIAGNOSIS — E65 ABDOMINAL OBESITY: ICD-10-CM

## 2025-02-12 DIAGNOSIS — E66.813 CLASS 3 SEVERE OBESITY DUE TO EXCESS CALORIES WITH SERIOUS COMORBIDITY AND BODY MASS INDEX (BMI) OF 40.0 TO 44.9 IN ADULT: ICD-10-CM

## 2025-02-12 DIAGNOSIS — M05.9 RHEUMATOID ARTHRITIS WITH POSITIVE RHEUMATOID FACTOR, INVOLVING UNSPECIFIED SITE (HCC): ICD-10-CM

## 2025-02-12 DIAGNOSIS — Z71.82 EXERCISE COUNSELING: ICD-10-CM

## 2025-02-12 PROCEDURE — 99215 OFFICE O/P EST HI 40 MIN: CPT | Performed by: SURGERY

## 2025-02-12 RX ORDER — SEMAGLUTIDE 2.4 MG/.75ML
2.4 INJECTION, SOLUTION SUBCUTANEOUS
Qty: 3 ML | Refills: 2 | Status: SHIPPED | OUTPATIENT
Start: 2025-02-12 | End: 2025-05-13

## 2025-02-12 RX ORDER — FOLIC ACID 1 MG/1
1 TABLET ORAL DAILY
Qty: 90 TABLET | Refills: 3 | Status: SHIPPED | OUTPATIENT
Start: 2025-02-12 | End: 2026-02-07

## 2025-02-28 ENCOUNTER — TRANSCRIBE ORDERS (OUTPATIENT)
Dept: SCHEDULING | Age: 45
End: 2025-02-28

## 2025-02-28 DIAGNOSIS — Z12.31 VISIT FOR SCREENING MAMMOGRAM: Primary | ICD-10-CM

## 2025-03-08 ENCOUNTER — HOSPITAL ENCOUNTER (OUTPATIENT)
Dept: MAMMOGRAPHY | Age: 45
Discharge: HOME | End: 2025-03-11
Attending: OBSTETRICS & GYNECOLOGY
Payer: COMMERCIAL

## 2025-03-08 VITALS — BODY MASS INDEX: 28.35 KG/M2 | HEIGHT: 63 IN | WEIGHT: 160 LBS

## 2025-03-08 DIAGNOSIS — Z12.31 VISIT FOR SCREENING MAMMOGRAM: ICD-10-CM

## 2025-03-08 PROCEDURE — 77063 BREAST TOMOSYNTHESIS BI: CPT

## 2025-04-03 ENCOUNTER — OFFICE VISIT (OUTPATIENT)
Dept: NEUROLOGY | Age: 45
End: 2025-04-03
Payer: COMMERCIAL

## 2025-04-03 VITALS — HEART RATE: 92 BPM | OXYGEN SATURATION: 99 % | SYSTOLIC BLOOD PRESSURE: 106 MMHG | DIASTOLIC BLOOD PRESSURE: 76 MMHG

## 2025-04-03 DIAGNOSIS — Z75.8 DOES NOT HAVE PRIMARY CARE PROVIDER: ICD-10-CM

## 2025-04-03 DIAGNOSIS — F32.A ANXIETY AND DEPRESSION: ICD-10-CM

## 2025-04-03 DIAGNOSIS — G43.111 INTRACTABLE MIGRAINE WITH AURA WITH STATUS MIGRAINOSUS: Primary | ICD-10-CM

## 2025-04-03 DIAGNOSIS — F41.9 ANXIETY AND DEPRESSION: ICD-10-CM

## 2025-04-03 PROCEDURE — 99214 OFFICE O/P EST MOD 30 MIN: CPT | Performed by: NURSE PRACTITIONER

## 2025-04-03 RX ORDER — FREMANEZUMAB-VFRM 225 MG/1.5ML
225 INJECTION SUBCUTANEOUS
Qty: 4.5 ML | Refills: 3 | Status: SHIPPED | OUTPATIENT
Start: 2025-04-03 | End: 2025-05-03

## 2025-04-03 RX ORDER — CETIRIZINE HYDROCHLORIDE 10 MG/1
10 TABLET ORAL DAILY
COMMUNITY
Start: 2025-01-20

## 2025-04-03 RX ORDER — FREMANEZUMAB-VFRM 225 MG/1.5ML
225 INJECTION SUBCUTANEOUS
COMMUNITY
End: 2025-04-03 | Stop reason: SDUPTHER

## 2025-04-03 RX ORDER — UBROGEPANT 100 MG/1
TABLET ORAL
Qty: 16 TABLET | Refills: 11 | Status: SHIPPED | OUTPATIENT
Start: 2025-04-03

## 2025-04-03 RX ORDER — ACETAMINOPHEN AND CODEINE PHOSPHATE 300; 30 MG/1; MG/1
1 TABLET ORAL EVERY 4 HOURS PRN
COMMUNITY
Start: 2025-02-02

## 2025-04-03 ASSESSMENT — ENCOUNTER SYMPTOMS
NAUSEA: 1
VOMITING: 1
PHOTOPHOBIA: 1

## 2025-04-03 NOTE — ASSESSMENT & PLAN NOTE
Patient has been without her Ajovy, which was previously providing approx 60% reduction in overall migraine burden. Will resume this. Her PA is approved until 4/18/2025 so advised to  asap and we will initiate a new PA.       Ubrevely at onset of migraine. Take 1 tablet at onset of migraine, may be repeated in 2 hours for a max of 2 doses in 24 hours.       I offered her a steroid pack and NSAIDS to take outpatient but has cannot tolerate/contraindicated due to concomitant medications. Advised her to take 25-50mg benadryl. This will likely cause drowsiness. She can trial the ubrevely first. If that works, she will omit the benadryl    If the headache is not improved, will need to schedule IV migraine infusion x 2 days. If we proceed with this would need to omit steroids and tordol.

## 2025-04-03 NOTE — PROGRESS NOTES
Gaby Dinh is a 44 y.o. female who presents with headaches.    CC: Headache          HPI:      Has been on Ajovy since March 2024, previously having 6-12 month now with 2 per month, decreasing to 1 but then increased to 2 migraines per week now. She has been off Ajovy, doesn't recall exactly how long, believes approx since January.  She notes she missed her appt. 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       She had a severe migraine yesterday where she almost went in the ER. This has been the most severe migraine in 12 years ago.       She reports severe headaches started in 2013, right sided headaches and facial pain desscribed as \"meat valentina\" pain with v1 and v3 pain on right side. She did have an MRI at the time which noted irritation of right TN.  She took a series of multiple medications, oxcarb, gabapentin, lyrica without great resolution. This was mostly being treated for her TN but also had migraines concomitantly. These first began 2013..  Duration: lasting days at a time  Severity is severe, now moderate. Quality of headaches are described as \"dull, sharp pain\".  Associated symptoms: nausea, vomiting, \"sparkles\", photophobia, phonophobia, watery eyes on ipsilatearl and feels like her head is warmer on the side of the pain.  The headaches are exacerbated by stress, sleep deprivation.          She also reports an episode of right eye blindness for 2.5 months in 2015, she was told that her pressure in her eye was 45. Denies history of glaucoma or psedutumor? She reports having a headache right retro-orbital pain and right temple pain that occurred during the episode of blindness. Received IV steroids for 5 days, symptoms returned a few days later, and had to return for additional steroids. She did have a repeat MRI at the time but doesn't know what this showed. She denies any episodes of hemibody numbness, negative lhermitte's, negative MS hug. She reports bilateral hand

## 2025-04-30 ENCOUNTER — OFFICE VISIT (OUTPATIENT)
Dept: ORTHOPEDIC SURGERY | Age: 45
End: 2025-04-30
Payer: COMMERCIAL

## 2025-04-30 DIAGNOSIS — L40.50 PSORIATIC ARTHRITIS (HCC): ICD-10-CM

## 2025-04-30 DIAGNOSIS — M22.2X1 PATELLOFEMORAL PAIN SYNDROME OF RIGHT KNEE: Primary | ICD-10-CM

## 2025-04-30 PROCEDURE — 99204 OFFICE O/P NEW MOD 45 MIN: CPT | Performed by: STUDENT IN AN ORGANIZED HEALTH CARE EDUCATION/TRAINING PROGRAM

## 2025-04-30 NOTE — PROGRESS NOTES
Name: Gaby Dinh  YOB: 1980  Gender: female  MRN: [id number]  Date of Encounter:  4/30/2025         CC  Chief Complaint   Patient presents with    Knee Pain     R       HPI:    History of Present Illness  The patient is a 44-year-old female presenting with knee pain.    She has been experiencing knee pain for several years, initially attributing it to her rheumatoid arthritis (RA). The pain has escalated to the point where she sought pain management due to the ineffectiveness of her RA infusion. An MRI conducted by her rheumatologist revealed concurrent psoriatic arthritis. Over the past 6 months, she has experienced difficulty ascending stairs due to sharp pain in the back of her knee. She is unable to perform leg extensions, even without weight, but can manage squats and lunges in the pool. She reports no new injuries since the MRI. She has been using braces since the age of 8 due to a condition where her legs would lock up if she sat cross-legged or squatted down, causing excruciating pain. She has a history of body building and can still perform some leg presses before the onset of pain. She has not undergone physical therapy. She has gained over 100 pounds from steroid injections for her RA.    Her left knee has also started to hurt due to increased use following a move to a townhouse. Pain management has suggested nerve ablation, but she prefers to consult with a regular doctor first. She has not undergone physical therapy. Previous treatments, including medication and a nerve blocker, were ineffective and caused stomach discomfort.        PAST HISTORY:   Past medical, surgical, family, social history and allergies reviewed by me.       REVIEW OF SYSTEMS:   As noted in HPI.     PHYSICAL EXAMINATION:   R Knee Exam  Patient stands in slight valgus alignment.    Sensation intact to light touch, dorsalis pedis and posterior tibial pulses 2+. Passive rotation at the hip is not painful.

## 2025-05-02 NOTE — PROGRESS NOTES
The patient was prescribed and fitted with a Lateral J-hinged knee brace for the right knee, size XL.     Patient read and signed documenting they understand and agree to [facility]'s current DME return policy.

## 2025-05-20 ENCOUNTER — OFFICE VISIT (OUTPATIENT)
Dept: SURGERY | Age: 45
End: 2025-05-20
Payer: COMMERCIAL

## 2025-05-20 VITALS
BODY MASS INDEX: 29.59 KG/M2 | SYSTOLIC BLOOD PRESSURE: 126 MMHG | DIASTOLIC BLOOD PRESSURE: 91 MMHG | HEART RATE: 73 BPM | WEIGHT: 167 LBS | HEIGHT: 63 IN

## 2025-05-20 DIAGNOSIS — Z71.3 NUTRITIONAL COUNSELING: ICD-10-CM

## 2025-05-20 DIAGNOSIS — K21.9 GASTROESOPHAGEAL REFLUX DISEASE, UNSPECIFIED WHETHER ESOPHAGITIS PRESENT: Primary | ICD-10-CM

## 2025-05-20 DIAGNOSIS — E65 ABDOMINAL OBESITY: ICD-10-CM

## 2025-05-20 DIAGNOSIS — E66.3 OVERWEIGHT WITH BODY MASS INDEX (BMI) OF 29 TO 29.9 IN ADULT: ICD-10-CM

## 2025-05-20 DIAGNOSIS — Z71.82 EXERCISE COUNSELING: ICD-10-CM

## 2025-05-20 DIAGNOSIS — M05.9 RHEUMATOID ARTHRITIS WITH POSITIVE RHEUMATOID FACTOR, INVOLVING UNSPECIFIED SITE (HCC): ICD-10-CM

## 2025-05-20 PROBLEM — L40.50 PSORIATIC ARTHRITIS (HCC): Status: ACTIVE | Noted: 2025-05-20

## 2025-05-20 PROBLEM — M05.79 RHEUMATOID ARTHRITIS INVOLVING MULTIPLE SITES WITH POSITIVE RHEUMATOID FACTOR (HCC): Status: ACTIVE | Noted: 2023-12-06

## 2025-05-20 PROBLEM — G56.03 BILATERAL CARPAL TUNNEL SYNDROME: Status: ACTIVE | Noted: 2022-06-01

## 2025-05-20 PROCEDURE — 99215 OFFICE O/P EST HI 40 MIN: CPT | Performed by: PHYSICIAN ASSISTANT

## 2025-05-20 RX ORDER — SEMAGLUTIDE 2.4 MG/.75ML
2.4 INJECTION, SOLUTION SUBCUTANEOUS
Qty: 3 ML | Refills: 0 | Status: SHIPPED | OUTPATIENT
Start: 2025-05-20 | End: 2025-06-17

## 2025-05-20 RX ORDER — DOXYCYCLINE 100 MG/1
CAPSULE ORAL
COMMUNITY
Start: 2025-04-10

## 2025-05-20 RX ORDER — HYDROCODONE BITARTRATE AND ACETAMINOPHEN 5; 325 MG/1; MG/1
TABLET ORAL
COMMUNITY
Start: 2025-05-09

## 2025-05-20 RX ORDER — FREMANEZUMAB-VFRM 225 MG/1.5ML
INJECTION SUBCUTANEOUS
COMMUNITY
Start: 2025-04-04

## 2025-05-20 RX ORDER — CLOBETASOL PROPIONATE 0.5 MG/ML
SOLUTION TOPICAL
COMMUNITY

## 2025-05-20 RX ORDER — ONDANSETRON 4 MG/1
4 TABLET, ORALLY DISINTEGRATING ORAL 3 TIMES DAILY
COMMUNITY
Start: 2025-02-28

## 2025-05-20 NOTE — PROGRESS NOTES
OBESITY MEDICINE NUTRITION REASSESSMENT     Assessment:    Pt reports working on dietary compliance since last office visit. Pt is eating at least 3x/d. Pt is drinking water, espresso, energy drinks. Pt is exercising via walking 35-40min 3-4 x weekly (or when able). Pt reports increased appetite.   Diet Recall:              Breakfast: Eggs, penn, raisin toast              Lunch: Turkey breast and salad   Snack: Ground beef, potatoes, and corn              Dinner: 1/2 sub and applesauce     Intervention:   Evaluated diet recall and identified modifications.  Encouraged pt to focus on diet while exercise is limited  Encouraged pt to be mindful of CHO foods  Encouraged continued and increased activity - when able.          Monitoring and Evaluation:  Monitor for continued safe, supervised weight loss for OM.    F/U per MD Nadine Simpson MBA. RD, LD    
medication at length.   3.         Encouraged patient to participate in our Bariatric Support Group.  4.         Advised that weight loss medication is only one part of a lifelong weight management program, and that sustainable weight loss will only come with major changes in eating habits, physical activity and behavior. Advised that obesity is a chronic disease and requires a commitment to self-management and long-term follow up.  5.         Nutrition Recommendations: please see dietitian notes.  6.         Exercise Recommendations: Exercise routine, incorporating both cardio and strength training, building up to 5x/wk for at least 300 minutes a week  7. Behavior Recommendations: Restart Wegovy to help curb increase in appetite     Scripts: Metformin 1000 mg BID, Wegovy 2.4 mg   Follow up in 1 month    Yumiko Aden PA-C  5/20/2025     Time spent: 40 minutes was spent preparing to see patient (including chart review and preparation), obtaining and/or reviewing additional medical history, performing a physical exam and evaluation, documenting clinical information in the EHR, independently interpreting results, communicating results to patient, family or caregiver, and/or coordinating care.

## 2025-05-29 ENCOUNTER — OFFICE VISIT (OUTPATIENT)
Dept: SURGERY | Age: 45
End: 2025-05-29
Payer: COMMERCIAL

## 2025-05-29 VITALS
SYSTOLIC BLOOD PRESSURE: 126 MMHG | DIASTOLIC BLOOD PRESSURE: 91 MMHG | WEIGHT: 169 LBS | BODY MASS INDEX: 29.95 KG/M2 | HEIGHT: 63 IN | HEART RATE: 67 BPM

## 2025-05-29 DIAGNOSIS — L73.2 HIDRADENITIS SUPPURATIVA OF RIGHT AXILLA: Primary | ICD-10-CM

## 2025-05-29 PROCEDURE — 99213 OFFICE O/P EST LOW 20 MIN: CPT | Performed by: SURGERY

## 2025-05-29 NOTE — PROGRESS NOTES
Fan Winn MD   General and Robotic surgery  [address], [address]  [place]  [address]  Phone [phone]   Fax [phone]      Date of visit: 2025      Primary/Requesting provider: Jinny Leon APRN - CNP         Name: Gaby Dinh      MRN: [id number]       : 1980       Age: 44 y.o.    Sex: female        PCP: Jinny Leon APRN - CNP     CC:    Chief Complaint   Patient presents with    Follow-up      R axilla epidermoid cyst       HPI:     Gaby Dinh is a 44 y.o. female with what appears to be hidradenitis involving the right axilla.  She has had problems with boils there previously.  She recently had a very large abscess that spontaneously drained and she has been placed on doxycycline by her dermatologist to help deal with this.  She is referred to me for consideration of excising the hidradenitis.  I need the area to be completely free of infection before I consider doing this procedure.  I have explained to her that I think she needs to strongly consider if she wants this done since the axilla is such a difficult place to have an incision in terms of healing and infection risk.  She will come back in 3 weeks time for me to reevaluate the hidradenitis to see if it is clear of infection      Past Medical History:   Diagnosis Date    Abnormal Papanicolaou smear of cervix 2004    repeat normal    Adverse effect of anesthesia 2017    woke up crying and hyperventilating and had to be re-sedated    Anemia     never required blood transfusions    Anxiety     Arthritis     Depression     managed with meds    Difficulty walking 2024    I stumble light dizziness    GERD (gastroesophageal reflux disease)     protonix prn, due to methotrexate    Headache 2013,    Herpes genitalia     History of chicken pox     Low back pain     Ra 2016    Memory disorder 2024    I forget things more often lately    Neuropathy 2013, 2016    Trigeminal neuralgia, optis

## 2025-06-12 ENCOUNTER — TELEPHONE (OUTPATIENT)
Dept: SURGERY | Age: 45
End: 2025-06-12

## 2025-06-12 ENCOUNTER — OFFICE VISIT (OUTPATIENT)
Dept: SURGERY | Age: 45
End: 2025-06-12
Payer: COMMERCIAL

## 2025-06-12 VITALS
DIASTOLIC BLOOD PRESSURE: 66 MMHG | SYSTOLIC BLOOD PRESSURE: 117 MMHG | BODY MASS INDEX: 28.88 KG/M2 | HEIGHT: 63 IN | HEART RATE: 80 BPM | WEIGHT: 163 LBS

## 2025-06-12 DIAGNOSIS — K21.9 GASTROESOPHAGEAL REFLUX DISEASE, UNSPECIFIED WHETHER ESOPHAGITIS PRESENT: Primary | ICD-10-CM

## 2025-06-12 DIAGNOSIS — Z71.82 EXERCISE COUNSELING: ICD-10-CM

## 2025-06-12 DIAGNOSIS — E66.3 OVERWEIGHT WITH BODY MASS INDEX (BMI) OF 28 TO 28.9 IN ADULT: ICD-10-CM

## 2025-06-12 DIAGNOSIS — Z71.3 NUTRITIONAL COUNSELING: ICD-10-CM

## 2025-06-12 DIAGNOSIS — E65 ABDOMINAL OBESITY: ICD-10-CM

## 2025-06-12 DIAGNOSIS — M05.9 RHEUMATOID ARTHRITIS WITH POSITIVE RHEUMATOID FACTOR, INVOLVING UNSPECIFIED SITE (HCC): ICD-10-CM

## 2025-06-12 PROCEDURE — 99215 OFFICE O/P EST HI 40 MIN: CPT | Performed by: PHYSICIAN ASSISTANT

## 2025-06-12 NOTE — PROGRESS NOTES
[facility] NUTRITION REASSESSMENT     Assessment:    Pt reports working on dietary compliance since last office visit. Pt is eating at least 3x/d. Pt is drinking water. Pt is exercising via walking and gym.   Diet Recall:              Breakfast: Bandar smoothie              Lunch: Eggs, turkey, tomatoes              Dinner: Beef, beef potatoes, green beans   Snack: applesauce and cheese stick     Intervention:   Evaluated diet recall  Encouraged continued and increased activity- as able.          Monitoring and Evaluation:  Monitor for continued safe, supervised weight loss for OM.    F/U per MD Nadine Simpson MBA. RD, LD    
Conditions  Sleep Apnea No, uses CPAP- NA   Diabetes No, insulin dependent- NA   Hypertension No, number of medications- 0   GERD RS   Hyperlipidemia No      Current Outpatient Medications   Medication Sig Dispense Refill    Semaglutide-Weight Management (WEGOVY) 2.4 MG/0.75ML SOAJ SC injection Inject 2.4 mg into the skin every 7 days 3 mL 0    clobetasol (TEMOVATE) 0.05 % external solution APPLY 5/10 DROPS TOPICALLY TO SCALP TWICE DAILY AS NEEDED      doxycycline hyclate (VIBRAMYCIN) 100 MG capsule TAKE 1 CAPSULE BY MOUTH TWICE DAILY WITH FOOD      AJOVY 225 MG/1.5ML SOAJ INJECT 225 MG UNDER THE SKIN EVERY 30 DAYS      HYDROcodone-acetaminophen (NORCO) 5-325 MG per tablet       ondansetron (ZOFRAN-ODT) 4 MG disintegrating tablet Take 1 tablet by mouth 3 times daily      metFORMIN (GLUCOPHAGE) 1000 MG tablet Take 1 tablet by mouth 2 times daily (with meals) 180 tablet 0    acetaminophen-codeine (TYLENOL #3) 300-30 MG per tablet Take 1 tablet by mouth every 4 hours as needed.      cetirizine (ZYRTEC) 10 MG tablet Take 1 tablet by mouth daily      Ubrogepant (UBRELVY) 100 MG TABS Take 1 tablet by mouth at onset of migraines, may repeat in 2 hours x 1 dose. Max dose of 2 tablets per day. 16 tablet 11    Certolizumab Pegol (CIMZIA PREFILLED SC) Inject into the skin See Admin Instructions Every 2 weeks for first three doses and then monthly      folic acid (FOLVITE) 1 MG tablet Take 1 tablet by mouth daily 90 tablet 3    hydrocortisone (ANUSOL-HC) 2.5 % CREA rectal cream Place cream rectally 2 times daily as needed for hemorrhoids. 28 g 0    betamethasone dipropionate 0.05 % lotion 1 Application      Ciclopirox 1 % SHAM 1 Application      golimumab (SIMPONI ARIA) 50 MG/4ML SOLN Infuse intravenously      diclofenac sodium (VOLTAREN) 1 % GEL Apply 2 g topically 3 times daily for 28 days 168 g 0    vitamin D (ERGOCALCIFEROL) 1.25 MG (59488 UT) CAPS capsule Take 1 capsule by mouth once a week 4 capsule 0    Multiple

## 2025-06-12 NOTE — TELEPHONE ENCOUNTER
Patient called stating she spoke with her pharmacy and they said her insurance company does not cover the Wegovy.

## 2025-06-20 ENCOUNTER — TELEPHONE (OUTPATIENT)
Dept: NEUROLOGY | Age: 45
End: 2025-06-20

## 2025-06-20 NOTE — TELEPHONE ENCOUNTER
Pt lvm stating that the Ubrelvy is not working and that she has been in the hospital for four days. Pt also stated that the hospital done an MRI and found some thingds wrong and would like for her to discuss with you. Pt stated that she would like for you to look over her hospital record before her next appt, which is in August.

## 2025-06-24 ENCOUNTER — TELEPHONE (OUTPATIENT)
Dept: PRIMARY CARE CLINIC | Facility: CLINIC | Age: 45
End: 2025-06-24

## 2025-06-24 SDOH — HEALTH STABILITY: PHYSICAL HEALTH: ON AVERAGE, HOW MANY MINUTES DO YOU ENGAGE IN EXERCISE AT THIS LEVEL?: 20 MIN

## 2025-06-24 SDOH — HEALTH STABILITY: PHYSICAL HEALTH: ON AVERAGE, HOW MANY DAYS PER WEEK DO YOU ENGAGE IN MODERATE TO STRENUOUS EXERCISE (LIKE A BRISK WALK)?: 2 DAYS

## 2025-06-24 NOTE — TELEPHONE ENCOUNTER
Pt called to report she was currently at another doctors appointment and will not be able to make NTP appt today.  Pt rescheduled for 8/19/25

## 2025-06-25 ENCOUNTER — OFFICE VISIT (OUTPATIENT)
Dept: ORTHOPEDIC SURGERY | Age: 45
End: 2025-06-25
Payer: COMMERCIAL

## 2025-06-25 DIAGNOSIS — L40.50 PSORIATIC ARTHRITIS (HCC): ICD-10-CM

## 2025-06-25 DIAGNOSIS — M22.2X1 PATELLOFEMORAL PAIN SYNDROME OF RIGHT KNEE: Primary | ICD-10-CM

## 2025-06-25 PROCEDURE — 99213 OFFICE O/P EST LOW 20 MIN: CPT | Performed by: STUDENT IN AN ORGANIZED HEALTH CARE EDUCATION/TRAINING PROGRAM

## 2025-06-25 NOTE — PROGRESS NOTES
Name: Gaby Dinh  YOB: 1980  Gender: female  MRN: [id number]  Date of Encounter:  6/25/2025         CC  Chief Complaint   Patient presents with    Follow-up     Right Knee       HPI:    History of Present Illness  The patient is a 44-year-old female presenting for a follow-up of knee pain. She is accompanied by her daughter.    She has been unable to start physical therapy due to personal circumstances, including her 's hip replacement surgery. She has been engaging in pool exercises, which she finds beneficial. However, she reports that her knee pain remains unchanged. She experiences pain when ascending stairs and can only perform a limited number of leg presses before the onset of pain. Leg extensions are particularly challenging for her, even without any additional weight. She typically performs 20 repetitions of each exercise, with the highest weight she can manage being 220 pounds. When the pain becomes too intense, she transitions to pool exercises such as lunges and squats, which she can perform without discomfort. She had previously scheduled a physical therapy appointment but had to cancel due to her 's surgery. Now that he has recovered and is able to drive, she plans to reschedule her physical therapy sessions.        PAST HISTORY:   Past medical, surgical, family, social history and allergies reviewed by me.       REVIEW OF SYSTEMS:   As noted in HPI.     PHYSICAL EXAMINATION:   R Knee Exam  Patient stands in slight valgus alignment.    Sensation intact to light touch, dorsalis pedis and posterior tibial pulses 2+. Passive rotation at the hip is not painful.    Quad bulk and tone are normal. No effusion.     ROM 0 / - / 130    Patellar facets tender. Mild patellar crepitus.  Patellar tendon tender.     Medial and lateral joint .    Lachman negative    Varus and valgus stress at 0 and 30 degrees are stable    Antwon is without palpable click or

## 2025-06-26 ENCOUNTER — OFFICE VISIT (OUTPATIENT)
Dept: SURGERY | Age: 45
End: 2025-06-26
Payer: COMMERCIAL

## 2025-06-26 VITALS
HEART RATE: 69 BPM | DIASTOLIC BLOOD PRESSURE: 75 MMHG | HEIGHT: 63 IN | SYSTOLIC BLOOD PRESSURE: 105 MMHG | WEIGHT: 167.8 LBS | BODY MASS INDEX: 29.73 KG/M2

## 2025-06-26 DIAGNOSIS — L73.2 HIDRADENITIS SUPPURATIVA OF RIGHT AXILLA: Primary | ICD-10-CM

## 2025-06-26 PROCEDURE — 99213 OFFICE O/P EST LOW 20 MIN: CPT | Performed by: SURGERY

## 2025-06-26 NOTE — PROGRESS NOTES
for this visit.        Allergies   Allergen Reactions    Gabapentin Hives and Swelling     Other Reaction(s): Hives/Swelling-Allergy    Buprenorphine      Other Reaction(s): Other (see comments)    Methotrexate Other (See Comments)     Heart burn    Other Reaction(s): Myalgia-Intolerance, Other (see comments)    Tramadol Other (See Comments)     Extreme stomach pains    Other Reaction(s): Abdominal pain-Intolerance, GI intolerance    Percocet [Oxycodone-Acetaminophen] Rash        Social History       Tobacco History       Smoking Status  Never      Smokeless Tobacco Use  Never              Alcohol History       Alcohol Use Status  Not Currently Drinks/Week  0 Glasses of wine, 0 Cans of beer, 0 Shots of liquor, 0 Drinks containing 0.5 oz of alcohol per week Comment  Over a year              Drug Use       Drug Use Status  Never              Sexual Activity       Sexually Active  Yes Partners  Male Birth Control/Protection  Surgical                     Family History   Problem Relation Age of Onset    Hypertension Paternal Grandmother     Osteoporosis Maternal Grandmother     Breast Cancer Maternal Grandmother         lung. Pt actually not sure which was the primary. She  at 62    Diabetes Maternal Grandmother     Hypertension Father     Liver Disease Mother         hep c cirrhos    Depression Mother     Mental Illness Mother         depression     Labor Mother         all children    Migraines Mother     Osteoarthritis Maternal Aunt     Hypertension Paternal Aunt     Breast Cancer Maternal Great Grandmother     Stroke Paternal Aunt     Ovarian Cancer Neg Hx     Uterine Cancer Neg Hx     Colon Cancer Neg Hx          Reviewed and updated this visit by provider:  Tobacco  Allergies  Meds  Problems  Med Hx  Surg Hx  Fam Hx         Review of systems:  A 13pt review of systems is unremarkable unless otherwise stated in HPI.     Physical Exam  Constitutional:       Appearance: Normal appearance. She is

## 2025-07-23 ENCOUNTER — TELEPHONE (OUTPATIENT)
Dept: OBGYN CLINIC | Age: 45
End: 2025-07-23

## 2025-07-23 NOTE — TELEPHONE ENCOUNTER
Patient lvm asking about past syphilis results. Patient stated her  came home and inform her he has syphilis after trying to donate plasma. Called patient back, patient stated [facility]. Patient stated she is currently at urgent care getting her blood drawn now. Informed patient she can let us know those results and if she needs additional testing to please call us. Patient voiced understanding.

## 2025-08-18 RX ORDER — DULOXETIN HYDROCHLORIDE 20 MG/1
CAPSULE, DELAYED RELEASE ORAL
COMMUNITY
End: 2025-08-19 | Stop reason: ALTCHOICE

## 2025-08-18 RX ORDER — FERROUS SULFATE 325(65) MG
1 TABLET ORAL DAILY
COMMUNITY
End: 2025-08-19 | Stop reason: ALTCHOICE

## 2025-08-19 ENCOUNTER — OFFICE VISIT (OUTPATIENT)
Dept: PRIMARY CARE CLINIC | Facility: CLINIC | Age: 45
End: 2025-08-19
Payer: COMMERCIAL

## 2025-08-19 VITALS
WEIGHT: 207.2 LBS | DIASTOLIC BLOOD PRESSURE: 64 MMHG | BODY MASS INDEX: 36.71 KG/M2 | HEART RATE: 89 BPM | OXYGEN SATURATION: 99 % | HEIGHT: 63 IN | SYSTOLIC BLOOD PRESSURE: 110 MMHG

## 2025-08-19 DIAGNOSIS — E66.01 OBESITY, MORBID (HCC): ICD-10-CM

## 2025-08-19 DIAGNOSIS — M05.79 RHEUMATOID ARTHRITIS INVOLVING MULTIPLE SITES WITH POSITIVE RHEUMATOID FACTOR (HCC): ICD-10-CM

## 2025-08-19 DIAGNOSIS — F32.A DEPRESSION, UNSPECIFIED DEPRESSION TYPE: ICD-10-CM

## 2025-08-19 DIAGNOSIS — Z00.00 ANNUAL PHYSICAL EXAM: Primary | ICD-10-CM

## 2025-08-19 DIAGNOSIS — F41.9 ANXIETY AND DEPRESSION: ICD-10-CM

## 2025-08-19 DIAGNOSIS — E11.65 TYPE 2 DIABETES MELLITUS WITH HYPERGLYCEMIA, WITHOUT LONG-TERM CURRENT USE OF INSULIN (HCC): ICD-10-CM

## 2025-08-19 DIAGNOSIS — F32.A ANXIETY AND DEPRESSION: ICD-10-CM

## 2025-08-19 DIAGNOSIS — Z00.00 ANNUAL PHYSICAL EXAM: ICD-10-CM

## 2025-08-19 DIAGNOSIS — D50.0 IRON DEFICIENCY ANEMIA DUE TO CHRONIC BLOOD LOSS: ICD-10-CM

## 2025-08-19 DIAGNOSIS — B00.9 HSV-2 INFECTION: ICD-10-CM

## 2025-08-19 LAB
ALBUMIN SERPL-MCNC: 3.2 G/DL (ref 3.5–5)
ALBUMIN/GLOB SERPL: 1 (ref 1–1.9)
ALP SERPL-CCNC: 44 U/L (ref 35–104)
ALT SERPL-CCNC: 72 U/L (ref 8–45)
ANION GAP SERPL CALC-SCNC: 6 MMOL/L (ref 7–16)
AST SERPL-CCNC: 41 U/L (ref 15–37)
BASOPHILS # BLD: 0.04 K/UL (ref 0–0.2)
BASOPHILS NFR BLD: 0.7 % (ref 0–2)
BILIRUB SERPL-MCNC: 0.5 MG/DL (ref 0–1.2)
BUN SERPL-MCNC: 12 MG/DL (ref 6–23)
CALCIUM SERPL-MCNC: 9.1 MG/DL (ref 8.8–10.2)
CHLORIDE SERPL-SCNC: 108 MMOL/L (ref 98–107)
CHOLEST SERPL-MCNC: 170 MG/DL (ref 0–200)
CO2 SERPL-SCNC: 24 MMOL/L (ref 20–29)
CREAT SERPL-MCNC: 0.64 MG/DL (ref 0.6–1.1)
DIFFERENTIAL METHOD BLD: ABNORMAL
EOSINOPHIL # BLD: 0.12 K/UL (ref 0–0.8)
EOSINOPHIL NFR BLD: 2.1 % (ref 0.5–7.8)
ERYTHROCYTE [DISTWIDTH] IN BLOOD BY AUTOMATED COUNT: 14.6 % (ref 11.9–14.6)
EST. AVERAGE GLUCOSE BLD GHB EST-MCNC: 104 MG/DL
GLOBULIN SER CALC-MCNC: 3.4 G/DL (ref 2.3–3.5)
GLUCOSE SERPL-MCNC: 80 MG/DL (ref 70–99)
HBA1C MFR BLD: 5.3 % (ref 0–5.6)
HCT VFR BLD AUTO: 31.3 % (ref 35.8–46.3)
HDLC SERPL-MCNC: 106 MG/DL (ref 40–60)
HDLC SERPL: 1.6 (ref 0–5)
HGB BLD-MCNC: 9.4 G/DL (ref 11.7–15.4)
IMM GRANULOCYTES # BLD AUTO: 0.02 K/UL (ref 0–0.5)
IMM GRANULOCYTES NFR BLD AUTO: 0.4 % (ref 0–5)
LDLC SERPL CALC-MCNC: 59 MG/DL (ref 0–100)
LYMPHOCYTES # BLD: 1.89 K/UL (ref 0.5–4.6)
LYMPHOCYTES NFR BLD: 33.3 % (ref 13–44)
MCH RBC QN AUTO: 26.3 PG (ref 26.1–32.9)
MCHC RBC AUTO-ENTMCNC: 30 G/DL (ref 31.4–35)
MCV RBC AUTO: 87.4 FL (ref 82–102)
MONOCYTES # BLD: 0.53 K/UL (ref 0.1–1.3)
MONOCYTES NFR BLD: 9.3 % (ref 4–12)
NEUTS SEG # BLD: 3.08 K/UL (ref 1.7–8.2)
NEUTS SEG NFR BLD: 54.2 % (ref 43–78)
NRBC # BLD: 0 K/UL (ref 0–0.2)
PLATELET # BLD AUTO: 145 K/UL (ref 150–450)
PMV BLD AUTO: 13.3 FL (ref 9.4–12.3)
POTASSIUM SERPL-SCNC: 4.7 MMOL/L (ref 3.5–5.1)
PROT SERPL-MCNC: 6.6 G/DL (ref 6.3–8.2)
RBC # BLD AUTO: 3.58 M/UL (ref 4.05–5.2)
SODIUM SERPL-SCNC: 138 MMOL/L (ref 136–145)
TRIGL SERPL-MCNC: 23 MG/DL (ref 0–150)
TSH, 3RD GENERATION: 1.98 UIU/ML (ref 0.27–4.2)
VLDLC SERPL CALC-MCNC: 5 MG/DL (ref 6–23)
WBC # BLD AUTO: 5.7 K/UL (ref 4.3–11.1)

## 2025-08-19 PROCEDURE — 99215 OFFICE O/P EST HI 40 MIN: CPT | Performed by: INTERNAL MEDICINE

## 2025-08-19 PROCEDURE — 99396 PREV VISIT EST AGE 40-64: CPT | Performed by: INTERNAL MEDICINE

## 2025-08-19 SDOH — ECONOMIC STABILITY: FOOD INSECURITY: WITHIN THE PAST 12 MONTHS, YOU WORRIED THAT YOUR FOOD WOULD RUN OUT BEFORE YOU GOT MONEY TO BUY MORE.: NEVER TRUE

## 2025-08-19 SDOH — ECONOMIC STABILITY: FOOD INSECURITY: WITHIN THE PAST 12 MONTHS, THE FOOD YOU BOUGHT JUST DIDN'T LAST AND YOU DIDN'T HAVE MONEY TO GET MORE.: NEVER TRUE

## 2025-08-19 ASSESSMENT — PATIENT HEALTH QUESTIONNAIRE - PHQ9
SUM OF ALL RESPONSES TO PHQ QUESTIONS 1-9: 20
3. TROUBLE FALLING OR STAYING ASLEEP: NEARLY EVERY DAY
9. THOUGHTS THAT YOU WOULD BE BETTER OFF DEAD, OR OF HURTING YOURSELF: SEVERAL DAYS
6. FEELING BAD ABOUT YOURSELF - OR THAT YOU ARE A FAILURE OR HAVE LET YOURSELF OR YOUR FAMILY DOWN: MORE THAN HALF THE DAYS
2. FEELING DOWN, DEPRESSED OR HOPELESS: NEARLY EVERY DAY
1. LITTLE INTEREST OR PLEASURE IN DOING THINGS: NEARLY EVERY DAY
SUM OF ALL RESPONSES TO PHQ QUESTIONS 1-9: 20
SUM OF ALL RESPONSES TO PHQ QUESTIONS 1-9: 19
4. FEELING TIRED OR HAVING LITTLE ENERGY: NEARLY EVERY DAY
5. POOR APPETITE OR OVEREATING: MORE THAN HALF THE DAYS
7. TROUBLE CONCENTRATING ON THINGS, SUCH AS READING THE NEWSPAPER OR WATCHING TELEVISION: NEARLY EVERY DAY
10. IF YOU CHECKED OFF ANY PROBLEMS, HOW DIFFICULT HAVE THESE PROBLEMS MADE IT FOR YOU TO DO YOUR WORK, TAKE CARE OF THINGS AT HOME, OR GET ALONG WITH OTHER PEOPLE: EXTREMELY DIFFICULT
SUM OF ALL RESPONSES TO PHQ QUESTIONS 1-9: 20
8. MOVING OR SPEAKING SO SLOWLY THAT OTHER PEOPLE COULD HAVE NOTICED. OR THE OPPOSITE, BEING SO FIGETY OR RESTLESS THAT YOU HAVE BEEN MOVING AROUND A LOT MORE THAN USUAL: NOT AT ALL

## 2025-08-19 ASSESSMENT — ENCOUNTER SYMPTOMS
WHEEZING: 0
CONSTIPATION: 0
SHORTNESS OF BREATH: 0
CHOKING: 0
SINUS PRESSURE: 0
ABDOMINAL PAIN: 0
ABDOMINAL DISTENTION: 0
EYE DISCHARGE: 0
ALLERGIC/IMMUNOLOGIC NEGATIVE: 1
BACK PAIN: 0
DIARRHEA: 0
EYE REDNESS: 0
EYES NEGATIVE: 1
COUGH: 0
COLOR CHANGE: 0
CHEST TIGHTNESS: 0
RESPIRATORY NEGATIVE: 1
BLOOD IN STOOL: 0
RECTAL PAIN: 0
EYE PAIN: 0
EYE ITCHING: 0
STRIDOR: 0
RHINORRHEA: 0
ANAL BLEEDING: 0
GASTROINTESTINAL NEGATIVE: 1
NAUSEA: 0

## 2025-08-19 ASSESSMENT — COLUMBIA-SUICIDE SEVERITY RATING SCALE - C-SSRS
2. HAVE YOU ACTUALLY HAD ANY THOUGHTS OF KILLING YOURSELF?: NO
1. WITHIN THE PAST MONTH, HAVE YOU WISHED YOU WERE DEAD OR WISHED YOU COULD GO TO SLEEP AND NOT WAKE UP?: YES
6. HAVE YOU EVER DONE ANYTHING, STARTED TO DO ANYTHING, OR PREPARED TO DO ANYTHING TO END YOUR LIFE?: NO

## 2025-08-20 ENCOUNTER — RESULTS FOLLOW-UP (OUTPATIENT)
Dept: PRIMARY CARE CLINIC | Facility: CLINIC | Age: 45
End: 2025-08-20

## (undated) DEVICE — INTENDED FOR TISSUE SEPARATION, AND OTHER PROCEDURES THAT REQUIRE A SHARP SURGICAL BLADE TO PUNCTURE OR CUT.: Brand: BARD-PARKER ® STAINLESS STEEL BLADES

## (undated) DEVICE — NON-ADHERING DRESSING: Brand: ADAPTIC®

## (undated) DEVICE — SUTURE VCRL SZ 2-0 L27IN ABSRB UD L26MM CT-2 1/2 CIR J269H

## (undated) DEVICE — DRAPE, FILM SHEET, 44X65 STERILE: Brand: MEDLINE

## (undated) DEVICE — AMD ANTIMICROBIAL GAUZE SPONGES,12 PLY USP TYPE VII, 0.2% POLYHEXAMETHYLENE BIGUANIDE HCI (PHMB): Brand: CURITY

## (undated) DEVICE — BANDAGE,GAUZE,BULKEE II,4.5"X4.1YD,STRL: Brand: MEDLINE

## (undated) DEVICE — TOWEL OR BLUEE 16X26IN ST 8 PACK ORB08 16X26ORTWL

## (undated) DEVICE — GOWN,REINF,POLY,ECL,PP SLV,XL: Brand: MEDLINE

## (undated) DEVICE — FOOT DR TOLLISON & WOMACK: Brand: MEDLINE INDUSTRIES, INC.

## (undated) DEVICE — CURITY NON-ADHERENT STRIPS: Brand: CURITY

## (undated) DEVICE — PAD,ABDOMINAL,8"X7.5",STERILE,LF,1/PK: Brand: MEDLINE

## (undated) DEVICE — BUTTON SWITCH PENCIL BLADE ELECTRODE, HOLSTER: Brand: EDGE

## (undated) DEVICE — STERILE HOOK LOCK LATEX FREE ELASTIC BANDAGE 4INX5YD: Brand: HOOK LOCK™

## (undated) DEVICE — SOLUTION IRRIG 1000ML 0.9% SOD CHL USP POUR PLAS BTL

## (undated) DEVICE — DISSECTOR ENDOSCP L7MM DIA3MM FOR RESECT SM JT COOLCUT

## (undated) DEVICE — REM POLYHESIVE ADULT PATIENT RETURN ELECTRODE: Brand: VALLEYLAB

## (undated) DEVICE — PADDING CAST W4INXL4YD ST COT COHESIVE HND TEARABLE SPEC

## (undated) DEVICE — ZIMMER® STERILE DISPOSABLE TOURNIQUET CUFF WITH PLC, DUAL PORT, SINGLE BLADDER, 18 IN. (46 CM)

## (undated) DEVICE — BLADE OPHTH DIA4MM MINI MENIS FLAT ARTHRO LOK

## (undated) DEVICE — SX-ONE MICROKNIFE IS REBRANDED AS ULTRAGUIDECTR.  ULTRAGUIDECTR IS INTENDED TO TRANSECT THE TRANSVERSE CARPAL LIGAMENT FOR THE TREATMENT OF CARPAL TUNNEL SYNDROME.ULTRAGUIDECTR IS A DISPOSABLE DEVICE INTENDED TO CREATE SPACE WITHIN THE CARPAL TUNNEL AND TRANSECT THE TRANSVERSE CARPAL LIGAMENT (TCL) FOR THE TREATMENT OF CARPAL TUNNEL SYNDROME.: Brand: SX-ONE MICROKNIFE

## (undated) DEVICE — ADHESIVE DERMABOND TOPICAL SKIN MINI .36ML

## (undated) DEVICE — BNDG,ELSTC,MATRIX,STRL,2"X5YD,LF,HOOK&LP: Brand: MEDLINE

## (undated) DEVICE — INTENDED FOR TISSUE SEPARATION, AND OTHER PROCEDURES THAT REQUIRE A SHARP SURGICAL BLADE TO PUNCTURE OR CUT.: Brand: BARD-PARKER ®  SAFETY SCALPED

## (undated) DEVICE — PADDING CAST W6INXL4YD ST COT COHESIVE HND TEARABLE SPEC

## (undated) DEVICE — NDL SPNE QNCKE 18GX3.5IN LF --

## (undated) DEVICE — SOLUTION IRRIG 1000ML 09% SOD CHL USP PIC PLAS CONTAINER

## (undated) DEVICE — STERILE HOOK LOCK LATEX FREE ELASTIC BANDAGE 6INX5YD: Brand: HOOK LOCK™

## (undated) DEVICE — SPLINT THMB W5XL30IN FBRGLS PD PRECUT LTWT DURABLE FAST SET

## (undated) DEVICE — HAND PACK: Brand: MEDLINE INDUSTRIES, INC.

## (undated) DEVICE — PREP SKN CHLRAPRP APL 26ML STR --

## (undated) DEVICE — SUT ETHLN 3-0 18IN PS1 BLK --

## (undated) DEVICE — SUTURE MCRYL SZ 3-0 L27IN ABSRB UD L19MM PS-2 3/8 CIR PRIM Y427H

## (undated) DEVICE — SOLUTION IRRIG 3000ML 0.9% SOD CHL FLX CONT 0797208] ICU MEDICAL INC]

## (undated) DEVICE — ZIMMER® STERILE DISPOSABLE TOURNIQUET CUFF WITH PLC, DUAL PORT, SINGLE BLADDER, 24 IN. (61 CM)

## (undated) DEVICE — GLOVE ORANGE PI 7 1/2   MSG9075

## (undated) DEVICE — SUTURE FIBERWIRE 3-0 L18IN NONABSORBABLE BLU L15MM 3/8 CIR AR722701